# Patient Record
Sex: MALE | Race: WHITE | NOT HISPANIC OR LATINO | Employment: OTHER | URBAN - METROPOLITAN AREA
[De-identification: names, ages, dates, MRNs, and addresses within clinical notes are randomized per-mention and may not be internally consistent; named-entity substitution may affect disease eponyms.]

---

## 2017-01-12 ENCOUNTER — CLINICAL SUPPORT (OUTPATIENT)
Dept: UROLOGY | Facility: CLINIC | Age: 72
End: 2017-01-12
Payer: MEDICARE

## 2017-01-12 DIAGNOSIS — E29.1 MALE HYPOGONADISM: Primary | ICD-10-CM

## 2017-01-12 PROCEDURE — 96372 THER/PROPH/DIAG INJ SC/IM: CPT | Mod: PBBFAC,PO

## 2017-01-12 RX ADMIN — TESTOSTERONE CYPIONATE 200 MG: 200 INJECTION, SOLUTION INTRAMUSCULAR at 10:01

## 2017-01-26 ENCOUNTER — CLINICAL SUPPORT (OUTPATIENT)
Dept: UROLOGY | Facility: CLINIC | Age: 72
End: 2017-01-26
Payer: MEDICARE

## 2017-01-26 DIAGNOSIS — R79.89 LOW SERUM TESTOSTERONE LEVEL: Primary | ICD-10-CM

## 2017-01-26 PROCEDURE — 96372 THER/PROPH/DIAG INJ SC/IM: CPT | Mod: PBBFAC,PO

## 2017-01-26 RX ADMIN — TESTOSTERONE CYPIONATE 200 MG: 200 INJECTION, SOLUTION INTRAMUSCULAR at 10:01

## 2017-02-07 ENCOUNTER — CLINICAL SUPPORT (OUTPATIENT)
Dept: UROLOGY | Facility: CLINIC | Age: 72
End: 2017-02-07
Payer: MEDICARE

## 2017-02-07 DIAGNOSIS — E29.1 MALE HYPOGONADISM: Primary | ICD-10-CM

## 2017-02-07 PROCEDURE — 96372 THER/PROPH/DIAG INJ SC/IM: CPT | Mod: PBBFAC,PO

## 2017-02-07 RX ORDER — TESTOSTERONE CYPIONATE 200 MG/ML
200 INJECTION, SOLUTION INTRAMUSCULAR
Status: DISCONTINUED | OUTPATIENT
Start: 2017-02-07 | End: 2017-03-20

## 2017-02-07 RX ADMIN — TESTOSTERONE CYPIONATE 200 MG: 200 INJECTION, SOLUTION INTRAMUSCULAR at 10:02

## 2017-02-21 ENCOUNTER — CLINICAL SUPPORT (OUTPATIENT)
Dept: UROLOGY | Facility: CLINIC | Age: 72
End: 2017-02-21
Payer: MEDICARE

## 2017-02-21 DIAGNOSIS — E29.1 MALE HYPOGONADISM: Primary | ICD-10-CM

## 2017-02-21 PROCEDURE — 96372 THER/PROPH/DIAG INJ SC/IM: CPT | Mod: PBBFAC,PO

## 2017-02-21 RX ADMIN — TESTOSTERONE CYPIONATE 200 MG: 200 INJECTION, SOLUTION INTRAMUSCULAR at 10:02

## 2017-03-07 ENCOUNTER — CLINICAL SUPPORT (OUTPATIENT)
Dept: UROLOGY | Facility: CLINIC | Age: 72
End: 2017-03-07
Payer: MEDICARE

## 2017-03-07 DIAGNOSIS — E29.1 MALE HYPOGONADISM: Primary | ICD-10-CM

## 2017-03-07 PROCEDURE — 96372 THER/PROPH/DIAG INJ SC/IM: CPT | Mod: PBBFAC,PO

## 2017-03-07 RX ADMIN — TESTOSTERONE CYPIONATE 200 MG: 200 INJECTION, SOLUTION INTRAMUSCULAR at 09:03

## 2017-03-10 ENCOUNTER — LAB VISIT (OUTPATIENT)
Dept: LAB | Facility: HOSPITAL | Age: 72
End: 2017-03-10
Attending: UROLOGY
Payer: MEDICARE

## 2017-03-10 DIAGNOSIS — E29.1 MALE HYPOGONADISM: ICD-10-CM

## 2017-03-10 LAB
ERYTHROCYTE [DISTWIDTH] IN BLOOD BY AUTOMATED COUNT: 13.4 %
HCT VFR BLD AUTO: 51.8 %
HGB BLD-MCNC: 18 G/DL
MCH RBC QN AUTO: 31.6 PG
MCHC RBC AUTO-ENTMCNC: 34.7 %
MCV RBC AUTO: 91 FL
PLATELET # BLD AUTO: 193 K/UL
PMV BLD AUTO: 10.5 FL
RBC # BLD AUTO: 5.7 M/UL
TESTOST SERPL-MCNC: >1500 NG/DL
WBC # BLD AUTO: 7.96 K/UL

## 2017-03-10 PROCEDURE — 85027 COMPLETE CBC AUTOMATED: CPT

## 2017-03-10 PROCEDURE — 84403 ASSAY OF TOTAL TESTOSTERONE: CPT

## 2017-03-10 PROCEDURE — 36415 COLL VENOUS BLD VENIPUNCTURE: CPT | Mod: PO

## 2017-03-20 ENCOUNTER — OFFICE VISIT (OUTPATIENT)
Dept: UROLOGY | Facility: CLINIC | Age: 72
End: 2017-03-20
Payer: MEDICARE

## 2017-03-20 VITALS
DIASTOLIC BLOOD PRESSURE: 82 MMHG | BODY MASS INDEX: 26.21 KG/M2 | WEIGHT: 167 LBS | HEIGHT: 67 IN | HEART RATE: 95 BPM | SYSTOLIC BLOOD PRESSURE: 138 MMHG

## 2017-03-20 DIAGNOSIS — R79.89 LOW TESTOSTERONE LEVEL IN MALE: Primary | ICD-10-CM

## 2017-03-20 DIAGNOSIS — R31.29 HEMATURIA, MICROSCOPIC: ICD-10-CM

## 2017-03-20 DIAGNOSIS — N13.8 BPH (BENIGN PROSTATIC HYPERTROPHY) WITH URINARY OBSTRUCTION: ICD-10-CM

## 2017-03-20 DIAGNOSIS — N40.1 BPH (BENIGN PROSTATIC HYPERTROPHY) WITH URINARY OBSTRUCTION: ICD-10-CM

## 2017-03-20 LAB
BILIRUB SERPL-MCNC: ABNORMAL MG/DL
BLOOD URINE, POC: ABNORMAL
COLOR, POC UA: YELLOW
GLUCOSE UR QL STRIP: ABNORMAL
KETONES UR QL STRIP: ABNORMAL
LEUKOCYTE ESTERASE URINE, POC: ABNORMAL
NITRITE, POC UA: ABNORMAL
PH, POC UA: 5
PROTEIN, POC: ABNORMAL
SPECIFIC GRAVITY, POC UA: 1
UROBILINOGEN, POC UA: ABNORMAL

## 2017-03-20 PROCEDURE — 99999 PR PBB SHADOW E&M-EST. PATIENT-LVL III: CPT | Mod: PBBFAC,,, | Performed by: UROLOGY

## 2017-03-20 PROCEDURE — 99213 OFFICE O/P EST LOW 20 MIN: CPT | Mod: PBBFAC,PO | Performed by: UROLOGY

## 2017-03-20 PROCEDURE — 99214 OFFICE O/P EST MOD 30 MIN: CPT | Mod: S$PBB,,, | Performed by: UROLOGY

## 2017-03-20 PROCEDURE — 96372 THER/PROPH/DIAG INJ SC/IM: CPT | Mod: PBBFAC,PO

## 2017-03-20 PROCEDURE — 81002 URINALYSIS NONAUTO W/O SCOPE: CPT | Mod: PBBFAC,PO | Performed by: UROLOGY

## 2017-03-20 RX ORDER — TESTOSTERONE CYPIONATE 200 MG/ML
150 INJECTION, SOLUTION INTRAMUSCULAR
Status: COMPLETED | OUTPATIENT
Start: 2017-03-20 | End: 2017-05-30

## 2017-03-20 RX ORDER — TAMSULOSIN HYDROCHLORIDE 0.4 MG/1
0.4 CAPSULE ORAL DAILY
Qty: 30 CAPSULE | Refills: 11 | Status: SHIPPED | OUTPATIENT
Start: 2017-03-20 | End: 2018-03-21

## 2017-03-20 RX ADMIN — TESTOSTERONE CYPIONATE 150 MG: 200 INJECTION, SOLUTION INTRAMUSCULAR at 02:03

## 2017-03-20 NOTE — MR AVS SNAPSHOT
"    [facility]  [address] [facility] [address]  [place] [address]  Phone: [phone]                  Mohit Schneider   3/20/2017 1:00 PM   Office Visit    Description:  Male : 1945   Provider:  BIBI Fonseca MD   Department:  [facility]           Reason for Visit     Follow-up                To Do List           Goals (5 Years of Data)     None      [facility]     [facility] Care [facility] -  Assistance  Registered nurses in the [facility] provide clinical advisement, health education, appointment booking, and other advisory services.  Call for this free service at [phone].             Medications           Message regarding Medications     Verify the changes and/or additions to your medication regime listed below are the same as discussed with your clinician today.  If any of these changes or additions are incorrect, please notify your healthcare provider.             Verify that the below list of medications is an accurate representation of the medications you are currently taking.  If none reported, the list may be blank. If incorrect, please contact your healthcare provider. Carry this list with you in case of emergency.           Current Medications     ADVAIR DISKUS 250-50 mcg/dose diskus inhaler USE 1 PUFF PO BID UTD    fexofenadine (ALLEGRA) 180 MG tablet Take 100 mg by mouth once daily.    hydrochlorothiazide (HYDRODIURIL) 25 MG tablet TK 1 T PO QD    irbesartan (AVAPRO) 150 MG tablet TK 1 T PO QD           Clinical Reference Information           Your Vitals Were     BP Pulse Height Weight BMI    138/82 (BP Location: Right arm) 95 5' 7" (1.702 m) 75.7 kg (166 lb 15.6 oz) 26.15 kg/m2      Blood Pressure          Most Recent Value    BP  138/82      Allergies as of 3/20/2017     Penicillins      Immunizations Administered on Date of Encounter - 3/20/2017     None      Language Assistance Services     ATTENTION: Language assistance services are " available, free of charge. Please call [phone].      ATENCIÓN: Si habla janny, tiene a johnson disposición servicios gratuitos de asistencia lingüística. Llame al [phone].     CHÚ Ý: N?u b?n nói Ti?ng Vi?t, có các d?ch v? h? tr? ngôn ng? mi?n phí dành cho b?n. G?i s? [phone].         [facility] complies with applicable Federal civil rights laws and does not discriminate on the basis of race, color, national origin, age, disability, or sex.

## 2017-03-20 NOTE — PROGRESS NOTES
Subjective:       Patient ID: Mohit Schneider is a 71 y.o. male.    Chief Complaint: Follow-up    HPI     71 year old with low testosterone. He has done well on IM testosterone replacement. He is here for follow up and lab check.   He is doing well.  He has increased energy level. His peak testosterone is >1500. H&H are normal.  Last PSA is 1.6.   He is scheduled for another PSA with Dr. Arias next month.  He now says the his urinary symptoms are becoming more bothersome.  Weak flow and urinary frequency.  He denies hematuria and dysuria.  Has microhematuria which he says has been a chronic finding.  He had previous cystoscopy years ago.  Last few UA had trace blood only.   Urine dipstick shows positive for 2+blood.  Micro exam: 3-5 RBC's per HPF.    Review of Systems   Constitutional: Negative for fever.   Genitourinary: Negative for dysuria and hematuria.       Objective:      Physical Exam   Constitutional: He is oriented to person, place, and time. He appears well-developed and well-nourished.   HENT:   Head: Normocephalic and atraumatic.   Eyes: Conjunctivae are normal.   Cardiovascular: Normal rate.    Pulmonary/Chest: Effort normal.   Genitourinary: Rectal exam shows no mass and anal tone normal. Prostate is enlarged (30g s/s/a). Prostate is not tender.   Musculoskeletal: Normal range of motion.   Neurological: He is alert and oriented to person, place, and time.   Skin: Skin is warm and dry. No rash noted.   Psychiatric: He has a normal mood and affect.   Vitals reviewed.      Assessment:       1. Low testosterone level in male    2. BPH (benign prostatic hypertrophy) with urinary obstruction    3. Hematuria, microscopic        Plan:       Low testosterone level in male  -     POCT URINE DIPSTICK WITHOUT MICROSCOPE  -     testosterone cypionate injection 150 mg; Inject 0.75 mLs (150 mg total) into the muscle every 14 (fourteen) days.  -     Testosterone; Future; Expected date: 9/20/17  -     Hematocrit;  Future; Expected date: 9/20/17  -     Hemoglobin; Future; Expected date: 9/20/17    BPH (benign prostatic hypertrophy) with urinary obstruction  -     tamsulosin (FLOMAX) 0.4 mg Cp24; Take 1 capsule (0.4 mg total) by mouth once daily.  Dispense: 30 capsule; Refill: 11    Hematuria, microscopic        Decrease dose to 150 mg every 2 weeks.  Trial Flomax.  RTC 6 months with repeat labs.  If hematuria persist, will recommend repeat cystoscopy.

## 2017-04-04 ENCOUNTER — CLINICAL SUPPORT (OUTPATIENT)
Dept: UROLOGY | Facility: CLINIC | Age: 72
End: 2017-04-04
Payer: MEDICARE

## 2017-04-04 DIAGNOSIS — E29.1 MALE HYPOGONADISM: Primary | ICD-10-CM

## 2017-04-04 PROCEDURE — 96372 THER/PROPH/DIAG INJ SC/IM: CPT | Mod: PBBFAC,PO

## 2017-04-04 RX ADMIN — TESTOSTERONE CYPIONATE 150 MG: 200 INJECTION, SOLUTION INTRAMUSCULAR at 10:04

## 2017-04-18 ENCOUNTER — CLINICAL SUPPORT (OUTPATIENT)
Dept: UROLOGY | Facility: CLINIC | Age: 72
End: 2017-04-18
Payer: MEDICARE

## 2017-04-18 DIAGNOSIS — E29.1 HYPOGONADISM MALE: Primary | ICD-10-CM

## 2017-04-18 PROCEDURE — 96372 THER/PROPH/DIAG INJ SC/IM: CPT | Mod: PBBFAC,PO

## 2017-04-18 RX ADMIN — TESTOSTERONE CYPIONATE 150 MG: 200 INJECTION, SOLUTION INTRAMUSCULAR at 09:04

## 2017-05-02 ENCOUNTER — CLINICAL SUPPORT (OUTPATIENT)
Dept: UROLOGY | Facility: CLINIC | Age: 72
End: 2017-05-02
Payer: MEDICARE

## 2017-05-02 DIAGNOSIS — E29.1 MALE HYPOGONADISM: Primary | ICD-10-CM

## 2017-05-02 PROCEDURE — 96372 THER/PROPH/DIAG INJ SC/IM: CPT | Mod: PBBFAC,PO

## 2017-05-02 RX ADMIN — TESTOSTERONE CYPIONATE 150 MG: 200 INJECTION, SOLUTION INTRAMUSCULAR at 09:05

## 2017-05-16 ENCOUNTER — CLINICAL SUPPORT (OUTPATIENT)
Dept: UROLOGY | Facility: CLINIC | Age: 72
End: 2017-05-16
Payer: MEDICARE

## 2017-05-16 DIAGNOSIS — R79.89 LOW SERUM TESTOSTERONE LEVEL: Primary | ICD-10-CM

## 2017-05-16 PROCEDURE — 96372 THER/PROPH/DIAG INJ SC/IM: CPT | Mod: PBBFAC,PO

## 2017-05-16 RX ADMIN — TESTOSTERONE CYPIONATE 150 MG: 200 INJECTION, SOLUTION INTRAMUSCULAR at 09:05

## 2017-05-30 ENCOUNTER — CLINICAL SUPPORT (OUTPATIENT)
Dept: UROLOGY | Facility: CLINIC | Age: 72
End: 2017-05-30
Payer: MEDICARE

## 2017-05-30 DIAGNOSIS — E29.1 MALE HYPOGONADISM: Primary | ICD-10-CM

## 2017-05-30 PROCEDURE — 96372 THER/PROPH/DIAG INJ SC/IM: CPT | Mod: PBBFAC,PO

## 2017-05-30 RX ADMIN — TESTOSTERONE CYPIONATE 150 MG: 200 INJECTION, SOLUTION INTRAMUSCULAR at 10:05

## 2017-05-30 NOTE — PROGRESS NOTES
Depotestosterone 150mg given deep IM to Left Ventrogluteal per written order from Dr Fonseca; 2 pt identifiers used; allergies assessed prior to medication administration; pt tolerated injection well.

## 2017-06-13 ENCOUNTER — CLINICAL SUPPORT (OUTPATIENT)
Dept: UROLOGY | Facility: CLINIC | Age: 72
End: 2017-06-13
Payer: MEDICARE

## 2017-06-13 DIAGNOSIS — E29.1 MALE HYPOGONADISM: Primary | ICD-10-CM

## 2017-06-13 PROCEDURE — 96372 THER/PROPH/DIAG INJ SC/IM: CPT | Mod: PBBFAC,PO

## 2017-06-13 RX ORDER — TESTOSTERONE CYPIONATE 200 MG/ML
150 INJECTION, SOLUTION INTRAMUSCULAR
Status: COMPLETED | OUTPATIENT
Start: 2017-06-13 | End: 2017-08-24

## 2017-06-13 RX ADMIN — TESTOSTERONE CYPIONATE 150 MG: 200 INJECTION, SOLUTION INTRAMUSCULAR at 10:06

## 2017-06-27 ENCOUNTER — CLINICAL SUPPORT (OUTPATIENT)
Dept: UROLOGY | Facility: CLINIC | Age: 72
End: 2017-06-27
Payer: MEDICARE

## 2017-06-27 DIAGNOSIS — E29.1 HYPOGONADISM MALE: Primary | ICD-10-CM

## 2017-06-27 PROCEDURE — 96372 THER/PROPH/DIAG INJ SC/IM: CPT | Mod: PBBFAC,PO

## 2017-06-27 RX ADMIN — TESTOSTERONE CYPIONATE 150 MG: 200 INJECTION, SOLUTION INTRAMUSCULAR at 10:06

## 2017-07-11 ENCOUNTER — CLINICAL SUPPORT (OUTPATIENT)
Dept: UROLOGY | Facility: CLINIC | Age: 72
End: 2017-07-11
Payer: MEDICARE

## 2017-07-11 DIAGNOSIS — R79.89 LOW SERUM TESTOSTERONE LEVEL: Primary | ICD-10-CM

## 2017-07-11 PROCEDURE — 96372 THER/PROPH/DIAG INJ SC/IM: CPT | Mod: PBBFAC,PO

## 2017-07-11 RX ADMIN — TESTOSTERONE CYPIONATE 150 MG: 200 INJECTION, SOLUTION INTRAMUSCULAR at 10:07

## 2017-07-25 ENCOUNTER — CLINICAL SUPPORT (OUTPATIENT)
Dept: UROLOGY | Facility: CLINIC | Age: 72
End: 2017-07-25
Payer: MEDICARE

## 2017-07-25 DIAGNOSIS — E29.1 MALE HYPOGONADISM: Primary | ICD-10-CM

## 2017-07-25 PROCEDURE — 96372 THER/PROPH/DIAG INJ SC/IM: CPT | Mod: PBBFAC,PO

## 2017-07-25 RX ADMIN — TESTOSTERONE CYPIONATE 150 MG: 200 INJECTION, SOLUTION INTRAMUSCULAR at 09:07

## 2017-08-08 ENCOUNTER — CLINICAL SUPPORT (OUTPATIENT)
Dept: UROLOGY | Facility: CLINIC | Age: 72
End: 2017-08-08
Payer: MEDICARE

## 2017-08-08 DIAGNOSIS — E29.1 MALE HYPOGONADISM: Primary | ICD-10-CM

## 2017-08-08 PROCEDURE — 99211 OFF/OP EST MAY X REQ PHY/QHP: CPT | Mod: PBBFAC,PO,25

## 2017-08-08 PROCEDURE — 96372 THER/PROPH/DIAG INJ SC/IM: CPT | Mod: PBBFAC,PO

## 2017-08-08 PROCEDURE — 99999 PR PBB SHADOW E&M-EST. PATIENT-LVL I: CPT | Mod: PBBFAC,,,

## 2017-08-08 RX ADMIN — TESTOSTERONE CYPIONATE 150 MG: 200 INJECTION, SOLUTION INTRAMUSCULAR at 10:08

## 2017-08-08 NOTE — PROGRESS NOTES
Pt received 150 mg testosterone injection IM in RUQ of buttocks. Pt tolerated procedure well.  Pt was instructed to wait 15 min for observation for adverse reaction after the injection. Pt refused to stay and verbalized understanding of risk.

## 2017-08-24 ENCOUNTER — CLINICAL SUPPORT (OUTPATIENT)
Dept: UROLOGY | Facility: CLINIC | Age: 72
End: 2017-08-24
Payer: MEDICARE

## 2017-08-24 VITALS
DIASTOLIC BLOOD PRESSURE: 80 MMHG | HEART RATE: 76 BPM | SYSTOLIC BLOOD PRESSURE: 132 MMHG | WEIGHT: 166 LBS | HEIGHT: 67 IN | BODY MASS INDEX: 26.06 KG/M2

## 2017-08-24 DIAGNOSIS — E29.1 MALE HYPOGONADISM: Primary | ICD-10-CM

## 2017-08-24 PROCEDURE — 99999 PR PBB SHADOW E&M-EST. PATIENT-LVL III: CPT | Mod: PBBFAC,,,

## 2017-08-24 PROCEDURE — 96372 THER/PROPH/DIAG INJ SC/IM: CPT | Mod: PBBFAC,PO

## 2017-08-24 PROCEDURE — 99213 OFFICE O/P EST LOW 20 MIN: CPT | Mod: PBBFAC,PO,25

## 2017-08-24 RX ADMIN — TESTOSTERONE CYPIONATE 150 MG: 200 INJECTION, SOLUTION INTRAMUSCULAR at 09:08

## 2017-08-24 NOTE — PROGRESS NOTES
Pt received 150 mg testosterone injection IM in LUQ of buttocks. Pt tolerated procedure well. Pt refused to wait for observation after the injection.

## 2017-09-05 ENCOUNTER — CLINICAL SUPPORT (OUTPATIENT)
Dept: UROLOGY | Facility: CLINIC | Age: 72
End: 2017-09-05
Payer: MEDICARE

## 2017-09-05 VITALS
HEART RATE: 69 BPM | BODY MASS INDEX: 26.06 KG/M2 | WEIGHT: 166 LBS | SYSTOLIC BLOOD PRESSURE: 135 MMHG | HEIGHT: 67 IN | DIASTOLIC BLOOD PRESSURE: 80 MMHG

## 2017-09-05 DIAGNOSIS — E29.1 HYPOGONADISM IN MALE: Primary | ICD-10-CM

## 2017-09-05 PROCEDURE — 99212 OFFICE O/P EST SF 10 MIN: CPT | Mod: PBBFAC,PO

## 2017-09-05 PROCEDURE — 99999 PR PBB SHADOW E&M-EST. PATIENT-LVL II: CPT | Mod: PBBFAC,,,

## 2017-09-05 PROCEDURE — 96372 THER/PROPH/DIAG INJ SC/IM: CPT | Mod: PBBFAC,PO

## 2017-09-05 RX ORDER — TESTOSTERONE CYPIONATE 200 MG/ML
150 INJECTION, SOLUTION INTRAMUSCULAR
Status: COMPLETED | OUTPATIENT
Start: 2017-09-05 | End: 2017-12-18

## 2017-09-05 RX ADMIN — TESTOSTERONE CYPIONATE 150 MG: 200 INJECTION, SOLUTION INTRAMUSCULAR at 09:09

## 2017-09-05 NOTE — PROGRESS NOTES
Pt received 150 mg testosterone injection IM in RUQ of buttocks. Pt tolerated procedure well. Pt refused to stay for observation.

## 2017-09-19 ENCOUNTER — CLINICAL SUPPORT (OUTPATIENT)
Dept: UROLOGY | Facility: CLINIC | Age: 72
End: 2017-09-19
Payer: MEDICARE

## 2017-09-19 VITALS
SYSTOLIC BLOOD PRESSURE: 133 MMHG | WEIGHT: 166 LBS | HEART RATE: 73 BPM | BODY MASS INDEX: 26.06 KG/M2 | HEIGHT: 67 IN | DIASTOLIC BLOOD PRESSURE: 80 MMHG

## 2017-09-19 DIAGNOSIS — E29.1 MALE HYPOGONADISM: Primary | ICD-10-CM

## 2017-09-19 PROCEDURE — 96372 THER/PROPH/DIAG INJ SC/IM: CPT | Mod: PBBFAC,PO

## 2017-09-19 PROCEDURE — 99999 PR PBB SHADOW E&M-EST. PATIENT-LVL III: CPT | Mod: PBBFAC,,,

## 2017-09-19 PROCEDURE — 99213 OFFICE O/P EST LOW 20 MIN: CPT | Mod: PBBFAC,PO,25

## 2017-09-19 RX ADMIN — TESTOSTERONE CYPIONATE 150 MG: 200 INJECTION, SOLUTION INTRAMUSCULAR at 10:09

## 2017-09-19 NOTE — PROGRESS NOTES
Pt received 150 mg testosterone injection IM in LUQ of buttocks. Pt tolerated procedure well. Pt refused to stay for observation.

## 2017-10-02 ENCOUNTER — CLINICAL SUPPORT (OUTPATIENT)
Dept: UROLOGY | Facility: CLINIC | Age: 72
End: 2017-10-02
Payer: MEDICARE

## 2017-10-02 DIAGNOSIS — E29.1 HYPOGONADISM MALE: Primary | ICD-10-CM

## 2017-10-02 PROCEDURE — 99999 PR PBB SHADOW E&M-EST. PATIENT-LVL I: CPT | Mod: PBBFAC,,,

## 2017-10-02 PROCEDURE — 96372 THER/PROPH/DIAG INJ SC/IM: CPT | Mod: PBBFAC,PO

## 2017-10-02 PROCEDURE — 99211 OFF/OP EST MAY X REQ PHY/QHP: CPT | Mod: PBBFAC,PO,25

## 2017-10-02 RX ADMIN — TESTOSTERONE CYPIONATE 150 MG: 200 INJECTION, SOLUTION INTRAMUSCULAR at 01:10

## 2017-10-02 NOTE — PROGRESS NOTES
Testosterone 150 ml  injection given, see MARS. Educated patient about Adverse Medication Reaction and instructed patient to wait for 15 minutes after injection.  Patient declined to wait.

## 2017-10-16 ENCOUNTER — CLINICAL SUPPORT (OUTPATIENT)
Dept: UROLOGY | Facility: CLINIC | Age: 72
End: 2017-10-16
Payer: MEDICARE

## 2017-10-16 DIAGNOSIS — E29.1 HYPOGONADISM MALE: Primary | ICD-10-CM

## 2017-10-16 PROCEDURE — 96372 THER/PROPH/DIAG INJ SC/IM: CPT | Mod: PBBFAC,PO

## 2017-10-16 RX ADMIN — TESTOSTERONE CYPIONATE 150 MG: 200 INJECTION, SOLUTION INTRAMUSCULAR at 02:10

## 2017-10-16 NOTE — PROGRESS NOTES
Testosterone 150 mg  injection given, see MARS. Educated patient about Adverse Medication Reaction and instructed patient to wait for 15 minutes after injection.  Patient had no adverse reactions.

## 2017-10-30 ENCOUNTER — CLINICAL SUPPORT (OUTPATIENT)
Dept: UROLOGY | Facility: CLINIC | Age: 72
End: 2017-10-30
Payer: MEDICARE

## 2017-10-30 DIAGNOSIS — E29.1 MALE HYPOGONADISM: Primary | ICD-10-CM

## 2017-10-30 PROCEDURE — 96372 THER/PROPH/DIAG INJ SC/IM: CPT | Mod: PBBFAC,PO

## 2017-10-30 PROCEDURE — 99212 OFFICE O/P EST SF 10 MIN: CPT | Mod: PBBFAC,PO

## 2017-10-30 PROCEDURE — 99999 PR PBB SHADOW E&M-EST. PATIENT-LVL II: CPT | Mod: PBBFAC,,,

## 2017-10-30 RX ADMIN — TESTOSTERONE CYPIONATE 150 MG: 200 INJECTION, SOLUTION INTRAMUSCULAR at 01:10

## 2017-11-20 ENCOUNTER — CLINICAL SUPPORT (OUTPATIENT)
Dept: UROLOGY | Facility: CLINIC | Age: 72
End: 2017-11-20
Payer: MEDICARE

## 2017-11-20 DIAGNOSIS — E29.1 MALE HYPOGONADISM: Primary | ICD-10-CM

## 2017-11-20 PROCEDURE — 96372 THER/PROPH/DIAG INJ SC/IM: CPT | Mod: PBBFAC,PO

## 2017-11-20 PROCEDURE — 99212 OFFICE O/P EST SF 10 MIN: CPT | Mod: PBBFAC,PO

## 2017-11-20 PROCEDURE — 99999 PR PBB SHADOW E&M-EST. PATIENT-LVL II: CPT | Mod: PBBFAC,,,

## 2017-11-20 RX ADMIN — TESTOSTERONE CYPIONATE 150 MG: 200 INJECTION, SOLUTION INTRAMUSCULAR at 01:11

## 2017-12-04 ENCOUNTER — CLINICAL SUPPORT (OUTPATIENT)
Dept: UROLOGY | Facility: CLINIC | Age: 72
End: 2017-12-04
Payer: MEDICARE

## 2017-12-04 DIAGNOSIS — E29.1 3-OXO-5 ALPHA-STEROID DELTA 4-DEHYDROGENASE DEFICIENCY: Primary | ICD-10-CM

## 2017-12-04 PROCEDURE — 99212 OFFICE O/P EST SF 10 MIN: CPT | Mod: PBBFAC,PO,25

## 2017-12-04 PROCEDURE — 96372 THER/PROPH/DIAG INJ SC/IM: CPT | Mod: PBBFAC,PO

## 2017-12-04 PROCEDURE — 99999 PR PBB SHADOW E&M-EST. PATIENT-LVL II: CPT | Mod: PBBFAC,,,

## 2017-12-04 RX ADMIN — TESTOSTERONE CYPIONATE 150 MG: 200 INJECTION, SOLUTION INTRAMUSCULAR at 02:12

## 2017-12-04 NOTE — PROGRESS NOTES
Pt received 150 mg testosterone injection IM in RUQ of buttocks. Pt tolerated procedure well. Pt refused to stay for observation. Pt verbalized understand of the risk with leaving the clinic.

## 2017-12-18 ENCOUNTER — CLINICAL SUPPORT (OUTPATIENT)
Dept: UROLOGY | Facility: CLINIC | Age: 72
End: 2017-12-18
Payer: MEDICARE

## 2017-12-18 DIAGNOSIS — E29.1 MALE HYPOGONADISM: Primary | ICD-10-CM

## 2017-12-18 PROCEDURE — 99212 OFFICE O/P EST SF 10 MIN: CPT | Mod: PBBFAC,PO

## 2017-12-18 PROCEDURE — 96372 THER/PROPH/DIAG INJ SC/IM: CPT | Mod: PBBFAC,PO

## 2017-12-18 PROCEDURE — 99999 PR PBB SHADOW E&M-EST. PATIENT-LVL II: CPT | Mod: PBBFAC,,,

## 2017-12-18 RX ORDER — TESTOSTERONE CYPIONATE 200 MG/ML
150 INJECTION, SOLUTION INTRAMUSCULAR
Status: SHIPPED | OUTPATIENT
Start: 2018-01-02 | End: 2018-03-27

## 2017-12-18 RX ADMIN — TESTOSTERONE CYPIONATE 150 MG: 200 INJECTION, SOLUTION INTRAMUSCULAR at 01:12

## 2017-12-18 NOTE — PROGRESS NOTES
Pt received 150 mg testosterone injection IM in LUQ of buttocks. Pt tolerated procedure well. Pt refused to stay for observation. Pt verbalized understand of the risk with leaving the clinic.

## 2018-01-02 ENCOUNTER — CLINICAL SUPPORT (OUTPATIENT)
Dept: UROLOGY | Facility: CLINIC | Age: 73
End: 2018-01-02
Payer: MEDICARE

## 2018-01-02 DIAGNOSIS — E29.1 HYPOGONADISM MALE: Primary | ICD-10-CM

## 2018-01-02 PROCEDURE — 99211 OFF/OP EST MAY X REQ PHY/QHP: CPT | Mod: PBBFAC,PO,25

## 2018-01-02 PROCEDURE — 99999 PR PBB SHADOW E&M-EST. PATIENT-LVL I: CPT | Mod: PBBFAC,,,

## 2018-01-02 PROCEDURE — 96372 THER/PROPH/DIAG INJ SC/IM: CPT | Mod: PBBFAC,PO

## 2018-01-02 RX ADMIN — TESTOSTERONE CYPIONATE 150 MG: 200 INJECTION, SOLUTION INTRAMUSCULAR at 02:01

## 2018-01-02 NOTE — PROGRESS NOTES
Testosterone 150 mg injection given, see MARS. Educated patient about Adverse Medication Reaction and instructed patient to wait for 15 minutes after injection.  Patient declined to wait.

## 2018-01-16 ENCOUNTER — CLINICAL SUPPORT (OUTPATIENT)
Dept: UROLOGY | Facility: CLINIC | Age: 73
End: 2018-01-16
Payer: MEDICARE

## 2018-01-16 DIAGNOSIS — E29.1 MALE HYPOGONADISM: Primary | ICD-10-CM

## 2018-01-16 PROCEDURE — 99212 OFFICE O/P EST SF 10 MIN: CPT | Mod: PBBFAC,PO,25

## 2018-01-16 PROCEDURE — 96372 THER/PROPH/DIAG INJ SC/IM: CPT | Mod: PBBFAC,PO

## 2018-01-16 PROCEDURE — 99999 PR PBB SHADOW E&M-EST. PATIENT-LVL II: CPT | Mod: PBBFAC,,,

## 2018-01-16 RX ADMIN — TESTOSTERONE CYPIONATE 150 MG: 200 INJECTION, SOLUTION INTRAMUSCULAR at 11:01

## 2018-01-30 ENCOUNTER — CLINICAL SUPPORT (OUTPATIENT)
Dept: UROLOGY | Facility: CLINIC | Age: 73
End: 2018-01-30
Payer: MEDICARE

## 2018-01-30 DIAGNOSIS — E29.1 HYPOGONADISM MALE: Primary | ICD-10-CM

## 2018-01-30 PROCEDURE — 96372 THER/PROPH/DIAG INJ SC/IM: CPT | Mod: PBBFAC,PO

## 2018-01-30 PROCEDURE — 99999 PR PBB SHADOW E&M-EST. PATIENT-LVL I: CPT | Mod: PBBFAC,,,

## 2018-01-30 PROCEDURE — 99211 OFF/OP EST MAY X REQ PHY/QHP: CPT | Mod: PBBFAC,PO

## 2018-01-30 RX ADMIN — TESTOSTERONE CYPIONATE 150 MG: 200 INJECTION, SOLUTION INTRAMUSCULAR at 11:01

## 2018-02-14 ENCOUNTER — TELEPHONE (OUTPATIENT)
Dept: UROLOGY | Facility: CLINIC | Age: 73
End: 2018-02-14

## 2018-02-14 NOTE — TELEPHONE ENCOUNTER
----- Message from Shana Dubose sent at 2/14/2018 11:01 AM CST -----  Contact: margie  Patient needs to cancel his nurse injection for today   Patient needs to reschedule [phone] (home)

## 2018-02-19 ENCOUNTER — CLINICAL SUPPORT (OUTPATIENT)
Dept: UROLOGY | Facility: CLINIC | Age: 73
End: 2018-02-19
Payer: MEDICARE

## 2018-02-19 DIAGNOSIS — E29.1 MALE HYPOGONADISM: Primary | ICD-10-CM

## 2018-02-19 PROCEDURE — 99212 OFFICE O/P EST SF 10 MIN: CPT | Mod: PBBFAC,PO,25

## 2018-02-19 PROCEDURE — 99999 PR PBB SHADOW E&M-EST. PATIENT-LVL II: CPT | Mod: PBBFAC,,,

## 2018-02-19 PROCEDURE — 96372 THER/PROPH/DIAG INJ SC/IM: CPT | Mod: PBBFAC,PO

## 2018-02-19 RX ADMIN — TESTOSTERONE CYPIONATE 150 MG: 200 INJECTION, SOLUTION INTRAMUSCULAR at 02:02

## 2018-03-06 ENCOUNTER — CLINICAL SUPPORT (OUTPATIENT)
Dept: UROLOGY | Facility: CLINIC | Age: 73
End: 2018-03-06
Payer: MEDICARE

## 2018-03-06 DIAGNOSIS — E29.1 HYPOGONADISM MALE: Primary | ICD-10-CM

## 2018-03-06 PROCEDURE — 96372 THER/PROPH/DIAG INJ SC/IM: CPT | Mod: PBBFAC,PO

## 2018-03-06 PROCEDURE — 99999 PR PBB SHADOW E&M-EST. PATIENT-LVL I: CPT | Mod: PBBFAC,,,

## 2018-03-06 PROCEDURE — 99211 OFF/OP EST MAY X REQ PHY/QHP: CPT | Mod: PBBFAC,PO,25

## 2018-03-06 RX ORDER — TESTOSTERONE CYPIONATE 200 MG/ML
150 INJECTION, SOLUTION INTRAMUSCULAR
Status: COMPLETED | OUTPATIENT
Start: 2018-03-21 | End: 2018-05-28

## 2018-03-06 RX ADMIN — TESTOSTERONE CYPIONATE 150 MG: 200 INJECTION, SOLUTION INTRAMUSCULAR at 09:03

## 2018-03-06 NOTE — PROGRESS NOTES
Testosterone 150mg injection given, see MARS. Educated patient about Adverse Medication Reaction and instructed patient to wait for 15 minutes after injection.  Patient declined to wait.

## 2018-03-09 ENCOUNTER — LAB VISIT (OUTPATIENT)
Dept: LAB | Facility: HOSPITAL | Age: 73
End: 2018-03-09
Attending: UROLOGY
Payer: MEDICARE

## 2018-03-09 DIAGNOSIS — R79.89 LOW TESTOSTERONE LEVEL IN MALE: ICD-10-CM

## 2018-03-09 LAB
HCT VFR BLD AUTO: 48.9 %
HGB BLD-MCNC: 16.3 G/DL
TESTOST SERPL-MCNC: 1447 NG/DL

## 2018-03-09 PROCEDURE — 36415 COLL VENOUS BLD VENIPUNCTURE: CPT | Mod: PO

## 2018-03-09 PROCEDURE — 85018 HEMOGLOBIN: CPT

## 2018-03-09 PROCEDURE — 85014 HEMATOCRIT: CPT

## 2018-03-09 PROCEDURE — 84403 ASSAY OF TOTAL TESTOSTERONE: CPT

## 2018-03-21 ENCOUNTER — OFFICE VISIT (OUTPATIENT)
Dept: UROLOGY | Facility: CLINIC | Age: 73
End: 2018-03-21
Payer: MEDICARE

## 2018-03-21 VITALS
SYSTOLIC BLOOD PRESSURE: 127 MMHG | BODY MASS INDEX: 26.12 KG/M2 | WEIGHT: 166.44 LBS | HEIGHT: 67 IN | DIASTOLIC BLOOD PRESSURE: 80 MMHG | HEART RATE: 79 BPM

## 2018-03-21 DIAGNOSIS — R31.29 HEMATURIA, MICROSCOPIC: ICD-10-CM

## 2018-03-21 DIAGNOSIS — N40.0 BPH WITHOUT URINARY OBSTRUCTION: ICD-10-CM

## 2018-03-21 DIAGNOSIS — R79.89 LOW SERUM TESTOSTERONE: Primary | ICD-10-CM

## 2018-03-21 LAB
BILIRUB SERPL-MCNC: ABNORMAL MG/DL
BLOOD URINE, POC: ABNORMAL
COLOR, POC UA: YELLOW
GLUCOSE UR QL STRIP: ABNORMAL
KETONES UR QL STRIP: ABNORMAL
LEUKOCYTE ESTERASE URINE, POC: ABNORMAL
NITRITE, POC UA: ABNORMAL
PH, POC UA: 5
PROTEIN, POC: ABNORMAL
SPECIFIC GRAVITY, POC UA: 1.02
UROBILINOGEN, POC UA: ABNORMAL

## 2018-03-21 PROCEDURE — 99214 OFFICE O/P EST MOD 30 MIN: CPT | Mod: S$PBB,,, | Performed by: UROLOGY

## 2018-03-21 PROCEDURE — 96372 THER/PROPH/DIAG INJ SC/IM: CPT | Mod: PBBFAC,PO

## 2018-03-21 PROCEDURE — 81002 URINALYSIS NONAUTO W/O SCOPE: CPT | Mod: PBBFAC,PO | Performed by: UROLOGY

## 2018-03-21 PROCEDURE — 99213 OFFICE O/P EST LOW 20 MIN: CPT | Mod: PBBFAC,PO,25 | Performed by: UROLOGY

## 2018-03-21 PROCEDURE — 99999 PR PBB SHADOW E&M-EST. PATIENT-LVL III: CPT | Mod: PBBFAC,,, | Performed by: UROLOGY

## 2018-03-21 RX ORDER — CARBIDOPA AND LEVODOPA 25; 100 MG/1; MG/1
1 TABLET ORAL 3 TIMES DAILY
COMMUNITY
Start: 2018-03-17

## 2018-03-21 RX ADMIN — TESTOSTERONE CYPIONATE 150 MG: 200 INJECTION, SOLUTION INTRAMUSCULAR at 09:03

## 2018-03-21 NOTE — PROGRESS NOTES
Subjective:       Patient ID: Mohit Schneider is a 72 y.o. male.    Chief Complaint: Follow-up    HPI     72 year old with low testosterone. He has done well on IM testosterone replacement. He is here for follow up and lab check.   He is doing well.  He has increased energy level. His peak testosterone is >1447. H&H are normal.  Last known PSA is 1.6.  Current dose is 150mg every 2 weeks.  He has no bothersome voiding symptoms.  He is no longer taking Flomax.  He denies gross hematuria and dysuria.  He has had  microhematuria which he says has been a chronic finding.  He had previous cystoscopy years ago.  Last few UA had trace blood only.   Urine dipstick shows positive for 2+blood.  Micro exam: 3-5 RBC's per HPF.    Review of Systems   Constitutional: Negative for fever.   Genitourinary: Negative for dysuria and hematuria.       Objective:      Physical Exam   Constitutional: He is oriented to person, place, and time. He appears well-developed and well-nourished.   HENT:   Head: Normocephalic and atraumatic.   Eyes: Conjunctivae are normal.   Cardiovascular: Normal rate.    Pulmonary/Chest: Effort normal.   Genitourinary: Rectal exam shows no mass and anal tone normal. Prostate is enlarged (40g, s/s/a). Prostate is not tender.   Musculoskeletal: Normal range of motion.   Neurological: He is alert and oriented to person, place, and time.   Skin: Skin is warm and dry. No rash noted.   Psychiatric: He has a normal mood and affect.   Vitals reviewed.      Assessment:       1. Low serum testosterone    2. BPH without urinary obstruction    3. Hematuria, microscopic        Plan:       Low serum testosterone  -     POCT URINE DIPSTICK WITHOUT MICROSCOPE  -     Prior Authorization Order  -     Hematocrit; Future; Expected date: 09/21/2018  -     Hemoglobin; Future; Expected date: 09/21/2018  -     Testosterone; Future; Expected date: 09/21/2018    BPH without urinary obstruction    Hematuria, microscopic      Continue  testosterone at current dose.  Get PSA from Dr. Arias.  RTC 6 months with repeat labs.    PSA is 1.55

## 2018-04-03 ENCOUNTER — CLINICAL SUPPORT (OUTPATIENT)
Dept: UROLOGY | Facility: CLINIC | Age: 73
End: 2018-04-03
Payer: MEDICARE

## 2018-04-03 DIAGNOSIS — E29.1 HYPOGONADISM MALE: Primary | ICD-10-CM

## 2018-04-03 PROCEDURE — 99211 OFF/OP EST MAY X REQ PHY/QHP: CPT | Mod: PBBFAC,PO

## 2018-04-03 PROCEDURE — 99999 PR PBB SHADOW E&M-EST. PATIENT-LVL I: CPT | Mod: PBBFAC,,,

## 2018-04-03 PROCEDURE — 96372 THER/PROPH/DIAG INJ SC/IM: CPT | Mod: PBBFAC,PO

## 2018-04-03 RX ADMIN — TESTOSTERONE CYPIONATE 150 MG: 200 INJECTION, SOLUTION INTRAMUSCULAR at 10:04

## 2018-04-17 ENCOUNTER — CLINICAL SUPPORT (OUTPATIENT)
Dept: UROLOGY | Facility: CLINIC | Age: 73
End: 2018-04-17
Payer: MEDICARE

## 2018-04-17 DIAGNOSIS — E29.1 MALE HYPOGONADISM: Primary | ICD-10-CM

## 2018-04-17 PROCEDURE — 96372 THER/PROPH/DIAG INJ SC/IM: CPT | Mod: PBBFAC,PO

## 2018-04-17 PROCEDURE — 99999 PR PBB SHADOW E&M-EST. PATIENT-LVL II: CPT | Mod: PBBFAC,,,

## 2018-04-17 PROCEDURE — 99212 OFFICE O/P EST SF 10 MIN: CPT | Mod: PBBFAC,PO

## 2018-04-17 RX ADMIN — TESTOSTERONE CYPIONATE 150 MG: 200 INJECTION, SOLUTION INTRAMUSCULAR at 10:04

## 2018-05-01 ENCOUNTER — CLINICAL SUPPORT (OUTPATIENT)
Dept: UROLOGY | Facility: CLINIC | Age: 73
End: 2018-05-01
Payer: MEDICARE

## 2018-05-01 DIAGNOSIS — E29.1 HYPOGONADISM MALE: Primary | ICD-10-CM

## 2018-05-01 PROCEDURE — 99211 OFF/OP EST MAY X REQ PHY/QHP: CPT | Mod: PBBFAC,PO,25

## 2018-05-01 PROCEDURE — 99999 PR PBB SHADOW E&M-EST. PATIENT-LVL I: CPT | Mod: PBBFAC,,,

## 2018-05-01 PROCEDURE — 96372 THER/PROPH/DIAG INJ SC/IM: CPT | Mod: PBBFAC,PO

## 2018-05-01 RX ADMIN — TESTOSTERONE CYPIONATE 150 MG: 200 INJECTION, SOLUTION INTRAMUSCULAR at 10:05

## 2018-05-15 ENCOUNTER — CLINICAL SUPPORT (OUTPATIENT)
Dept: UROLOGY | Facility: CLINIC | Age: 73
End: 2018-05-15
Payer: MEDICARE

## 2018-05-15 DIAGNOSIS — E29.1 MALE HYPOGONADISM: Primary | ICD-10-CM

## 2018-05-15 PROCEDURE — 99212 OFFICE O/P EST SF 10 MIN: CPT | Mod: PBBFAC,PO,25

## 2018-05-15 PROCEDURE — 99999 PR PBB SHADOW E&M-EST. PATIENT-LVL II: CPT | Mod: PBBFAC,,,

## 2018-05-15 PROCEDURE — 96372 THER/PROPH/DIAG INJ SC/IM: CPT | Mod: PBBFAC,PO

## 2018-05-15 RX ADMIN — TESTOSTERONE CYPIONATE 150 MG: 200 INJECTION, SOLUTION INTRAMUSCULAR at 10:05

## 2018-05-28 ENCOUNTER — CLINICAL SUPPORT (OUTPATIENT)
Dept: UROLOGY | Facility: CLINIC | Age: 73
End: 2018-05-28
Payer: MEDICARE

## 2018-05-28 DIAGNOSIS — E29.1 HYPOGONADISM MALE: Primary | ICD-10-CM

## 2018-05-28 PROCEDURE — 99999 PR PBB SHADOW E&M-EST. PATIENT-LVL I: CPT | Mod: PBBFAC,,,

## 2018-05-28 PROCEDURE — 96372 THER/PROPH/DIAG INJ SC/IM: CPT | Mod: PBBFAC,PO

## 2018-05-28 PROCEDURE — 99211 OFF/OP EST MAY X REQ PHY/QHP: CPT | Mod: PBBFAC,PO

## 2018-05-28 RX ORDER — TESTOSTERONE CYPIONATE 200 MG/ML
150 INJECTION, SOLUTION INTRAMUSCULAR
Status: COMPLETED | OUTPATIENT
Start: 2018-06-12 | End: 2018-08-22

## 2018-05-28 RX ADMIN — TESTOSTERONE CYPIONATE 150 MG: 200 INJECTION, SOLUTION INTRAMUSCULAR at 11:05

## 2018-06-13 ENCOUNTER — CLINICAL SUPPORT (OUTPATIENT)
Dept: UROLOGY | Facility: CLINIC | Age: 73
End: 2018-06-13
Payer: MEDICARE

## 2018-06-13 DIAGNOSIS — E29.1 MALE HYPOGONADISM: Primary | ICD-10-CM

## 2018-06-13 PROCEDURE — 99212 OFFICE O/P EST SF 10 MIN: CPT | Mod: PBBFAC,PO,25

## 2018-06-13 PROCEDURE — 99999 PR PBB SHADOW E&M-EST. PATIENT-LVL II: CPT | Mod: PBBFAC,,,

## 2018-06-13 PROCEDURE — 96372 THER/PROPH/DIAG INJ SC/IM: CPT | Mod: PBBFAC,PO

## 2018-06-13 RX ADMIN — TESTOSTERONE CYPIONATE 150 MG: 200 INJECTION, SOLUTION INTRAMUSCULAR at 09:06

## 2018-06-27 ENCOUNTER — CLINICAL SUPPORT (OUTPATIENT)
Dept: UROLOGY | Facility: CLINIC | Age: 73
End: 2018-06-27
Payer: MEDICARE

## 2018-06-27 DIAGNOSIS — E29.1 MALE HYPOGONADISM: Primary | ICD-10-CM

## 2018-06-27 PROCEDURE — 99999 PR PBB SHADOW E&M-EST. PATIENT-LVL I: CPT | Mod: PBBFAC,,,

## 2018-06-27 PROCEDURE — 99211 OFF/OP EST MAY X REQ PHY/QHP: CPT | Mod: PBBFAC,PO,25

## 2018-06-27 PROCEDURE — 96372 THER/PROPH/DIAG INJ SC/IM: CPT | Mod: PBBFAC,PO

## 2018-06-27 RX ADMIN — TESTOSTERONE CYPIONATE 150 MG: 200 INJECTION, SOLUTION INTRAMUSCULAR at 10:06

## 2018-07-11 ENCOUNTER — CLINICAL SUPPORT (OUTPATIENT)
Dept: UROLOGY | Facility: CLINIC | Age: 73
End: 2018-07-11
Payer: MEDICARE

## 2018-07-11 DIAGNOSIS — E29.1 HYPOGONADISM MALE: Primary | ICD-10-CM

## 2018-07-11 PROCEDURE — 99211 OFF/OP EST MAY X REQ PHY/QHP: CPT | Mod: PBBFAC,PO,25

## 2018-07-11 PROCEDURE — 96372 THER/PROPH/DIAG INJ SC/IM: CPT | Mod: PBBFAC,PO

## 2018-07-11 PROCEDURE — 99999 PR PBB SHADOW E&M-EST. PATIENT-LVL I: CPT | Mod: PBBFAC,,,

## 2018-07-11 RX ADMIN — TESTOSTERONE CYPIONATE 150 MG: 200 INJECTION, SOLUTION INTRAMUSCULAR at 09:07

## 2018-07-25 ENCOUNTER — CLINICAL SUPPORT (OUTPATIENT)
Dept: UROLOGY | Facility: CLINIC | Age: 73
End: 2018-07-25
Payer: MEDICARE

## 2018-07-25 DIAGNOSIS — E29.1 MALE HYPOGONADISM: Primary | ICD-10-CM

## 2018-07-25 PROCEDURE — 99999 PR PBB SHADOW E&M-EST. PATIENT-LVL I: CPT | Mod: PBBFAC,,,

## 2018-07-25 PROCEDURE — 96372 THER/PROPH/DIAG INJ SC/IM: CPT | Mod: PBBFAC,PO

## 2018-07-25 PROCEDURE — 99211 OFF/OP EST MAY X REQ PHY/QHP: CPT | Mod: PBBFAC,PO,25

## 2018-07-25 RX ADMIN — TESTOSTERONE CYPIONATE 150 MG: 200 INJECTION, SOLUTION INTRAMUSCULAR at 10:07

## 2018-08-08 ENCOUNTER — CLINICAL SUPPORT (OUTPATIENT)
Dept: UROLOGY | Facility: CLINIC | Age: 73
End: 2018-08-08
Payer: MEDICARE

## 2018-08-08 DIAGNOSIS — E29.1 MALE HYPOGONADISM: Primary | ICD-10-CM

## 2018-08-08 PROCEDURE — 99999 PR PBB SHADOW E&M-EST. PATIENT-LVL II: CPT | Mod: PBBFAC,,,

## 2018-08-08 PROCEDURE — 99212 OFFICE O/P EST SF 10 MIN: CPT | Mod: PBBFAC,PO

## 2018-08-08 PROCEDURE — 96372 THER/PROPH/DIAG INJ SC/IM: CPT | Mod: PBBFAC,PO

## 2018-08-08 RX ADMIN — TESTOSTERONE CYPIONATE 150 MG: 200 INJECTION, SOLUTION INTRAMUSCULAR at 10:08

## 2018-08-22 ENCOUNTER — CLINICAL SUPPORT (OUTPATIENT)
Dept: UROLOGY | Facility: CLINIC | Age: 73
End: 2018-08-22
Payer: MEDICARE

## 2018-08-22 DIAGNOSIS — E29.1 HYPOGONADISM MALE: Primary | ICD-10-CM

## 2018-08-22 PROCEDURE — 99211 OFF/OP EST MAY X REQ PHY/QHP: CPT | Mod: PBBFAC,PO

## 2018-08-22 PROCEDURE — 96372 THER/PROPH/DIAG INJ SC/IM: CPT | Mod: PBBFAC,PO

## 2018-08-22 PROCEDURE — 99999 PR PBB SHADOW E&M-EST. PATIENT-LVL I: CPT | Mod: PBBFAC,,,

## 2018-08-22 RX ORDER — TESTOSTERONE CYPIONATE 200 MG/ML
150 INJECTION, SOLUTION INTRAMUSCULAR
Status: DISCONTINUED | OUTPATIENT
Start: 2018-09-05 | End: 2018-10-31 | Stop reason: SDUPTHER

## 2018-08-22 RX ADMIN — TESTOSTERONE CYPIONATE 150 MG: 200 INJECTION, SOLUTION INTRAMUSCULAR at 10:08

## 2018-09-05 ENCOUNTER — CLINICAL SUPPORT (OUTPATIENT)
Dept: UROLOGY | Facility: CLINIC | Age: 73
End: 2018-09-05
Payer: MEDICARE

## 2018-09-05 DIAGNOSIS — E29.1 MALE HYPOGONADISM: Primary | ICD-10-CM

## 2018-09-05 PROCEDURE — 99212 OFFICE O/P EST SF 10 MIN: CPT | Mod: PBBFAC,PO,25

## 2018-09-05 PROCEDURE — 99999 PR PBB SHADOW E&M-EST. PATIENT-LVL II: CPT | Mod: PBBFAC,,,

## 2018-09-05 PROCEDURE — 96372 THER/PROPH/DIAG INJ SC/IM: CPT | Mod: PBBFAC,PO

## 2018-09-05 RX ADMIN — TESTOSTERONE CYPIONATE 150 MG: 200 INJECTION, SOLUTION INTRAMUSCULAR at 10:09

## 2018-09-10 ENCOUNTER — LAB VISIT (OUTPATIENT)
Dept: LAB | Facility: HOSPITAL | Age: 73
End: 2018-09-10
Attending: UROLOGY
Payer: MEDICARE

## 2018-09-10 DIAGNOSIS — R79.89 LOW SERUM TESTOSTERONE: ICD-10-CM

## 2018-09-10 LAB
HCT VFR BLD AUTO: 49.2 %
HGB BLD-MCNC: 16.4 G/DL
TESTOST SERPL-MCNC: 887 NG/DL

## 2018-09-10 PROCEDURE — 84403 ASSAY OF TOTAL TESTOSTERONE: CPT

## 2018-09-10 PROCEDURE — 85018 HEMOGLOBIN: CPT

## 2018-09-10 PROCEDURE — 85014 HEMATOCRIT: CPT

## 2018-09-10 PROCEDURE — 36415 COLL VENOUS BLD VENIPUNCTURE: CPT | Mod: PO

## 2018-09-19 ENCOUNTER — OFFICE VISIT (OUTPATIENT)
Dept: UROLOGY | Facility: CLINIC | Age: 73
End: 2018-09-19
Payer: MEDICARE

## 2018-09-19 VITALS
DIASTOLIC BLOOD PRESSURE: 70 MMHG | BODY MASS INDEX: 25.96 KG/M2 | SYSTOLIC BLOOD PRESSURE: 114 MMHG | HEART RATE: 76 BPM | WEIGHT: 165.38 LBS | HEIGHT: 67 IN

## 2018-09-19 DIAGNOSIS — E29.1 MALE HYPOGONADISM: Primary | ICD-10-CM

## 2018-09-19 LAB
BILIRUB SERPL-MCNC: NORMAL MG/DL
BLOOD URINE, POC: NORMAL
COLOR, POC UA: YELLOW
GLUCOSE UR QL STRIP: NORMAL
KETONES UR QL STRIP: NORMAL
LEUKOCYTE ESTERASE URINE, POC: NORMAL
NITRITE, POC UA: NORMAL
PH, POC UA: 7
PROTEIN, POC: NORMAL
SPECIFIC GRAVITY, POC UA: 1.02
UROBILINOGEN, POC UA: NORMAL

## 2018-09-19 PROCEDURE — 99213 OFFICE O/P EST LOW 20 MIN: CPT | Mod: S$PBB,,, | Performed by: UROLOGY

## 2018-09-19 PROCEDURE — 96372 THER/PROPH/DIAG INJ SC/IM: CPT | Mod: PBBFAC,PO

## 2018-09-19 PROCEDURE — 99999 PR PBB SHADOW E&M-EST. PATIENT-LVL III: CPT | Mod: PBBFAC,,, | Performed by: UROLOGY

## 2018-09-19 PROCEDURE — 81002 URINALYSIS NONAUTO W/O SCOPE: CPT | Mod: PBBFAC,PO | Performed by: UROLOGY

## 2018-09-19 PROCEDURE — 99213 OFFICE O/P EST LOW 20 MIN: CPT | Mod: PBBFAC,PO,25 | Performed by: UROLOGY

## 2018-09-19 RX ORDER — TESTOSTERONE CYPIONATE 200 MG/ML
150 INJECTION, SOLUTION INTRAMUSCULAR
Status: COMPLETED | OUTPATIENT
Start: 2018-09-19 | End: 2019-02-19

## 2018-09-19 RX ADMIN — TESTOSTERONE CYPIONATE 150 MG: 200 INJECTION, SOLUTION INTRAMUSCULAR at 10:09

## 2018-09-19 NOTE — PROGRESS NOTES
Subjective:       Patient ID: Mohit Schneider is a 73 y.o. male.    Chief Complaint: Follow-up and Injections    HPI     73 year old with low testosterone. He has done well on IM testosterone replacement. He is here for follow up and lab check.   He is doing well.  He has increased energy level. His peak testosterone is >887. H&H are normal.   Current dose is 150mg every 2 weeks.  He has no bothersome voiding symptoms.    Urine dipstick shows positive for 1+blood.  Micro exam: 2-3 RBC's per HPF      Review of Systems    Objective:      Physical Exam   Musculoskeletal: He exhibits no edema.       Assessment:       1. Male hypogonadism        Plan:       Male hypogonadism  -     POCT URINE DIPSTICK WITHOUT MICROSCOPE  -     Prior Authorization Order  -     Testosterone; Future; Expected date: 03/22/2019  -     Hemoglobin; Future; Expected date: 03/22/2019  -     Hematocrit; Future; Expected date: 03/22/2019    Other orders  -     testosterone cypionate injection 150 mg; Inject 0.75 mLs (150 mg total) into the muscle every 14 (fourteen) days.      Continue testosterone at current dose.  Get PSA from Dr. Arias.  RTC 6 months      Addendum:  PSA 9/11/2018 1.91

## 2018-10-02 ENCOUNTER — CLINICAL SUPPORT (OUTPATIENT)
Dept: UROLOGY | Facility: CLINIC | Age: 73
End: 2018-10-02
Payer: MEDICARE

## 2018-10-02 DIAGNOSIS — E29.1 MALE HYPOGONADISM: Primary | ICD-10-CM

## 2018-10-02 PROCEDURE — 96372 THER/PROPH/DIAG INJ SC/IM: CPT | Mod: PBBFAC,PO

## 2018-10-02 PROCEDURE — 99999 PR PBB SHADOW E&M-EST. PATIENT-LVL II: CPT | Mod: PBBFAC,,,

## 2018-10-02 PROCEDURE — 99212 OFFICE O/P EST SF 10 MIN: CPT | Mod: PBBFAC,PO,25

## 2018-10-02 RX ADMIN — TESTOSTERONE CYPIONATE 150 MG: 200 INJECTION, SOLUTION INTRAMUSCULAR at 10:10

## 2018-10-02 NOTE — PROGRESS NOTES
Testosterone injection given, see MAR. Patient tolerated well, recommended to wait 20 minutes after injection to observe for adverse reaction, patient declined to wait.

## 2018-10-16 ENCOUNTER — CLINICAL SUPPORT (OUTPATIENT)
Dept: UROLOGY | Facility: CLINIC | Age: 73
End: 2018-10-16
Payer: MEDICARE

## 2018-10-16 DIAGNOSIS — E29.1 MALE HYPOGONADISM: Primary | ICD-10-CM

## 2018-10-16 PROCEDURE — 99212 OFFICE O/P EST SF 10 MIN: CPT | Mod: PBBFAC,PO

## 2018-10-16 PROCEDURE — 96372 THER/PROPH/DIAG INJ SC/IM: CPT | Mod: PBBFAC,PO

## 2018-10-16 PROCEDURE — 99999 PR PBB SHADOW E&M-EST. PATIENT-LVL II: CPT | Mod: PBBFAC,,,

## 2018-10-16 RX ADMIN — TESTOSTERONE CYPIONATE 150 MG: 200 INJECTION, SOLUTION INTRAMUSCULAR at 10:10

## 2018-10-31 ENCOUNTER — CLINICAL SUPPORT (OUTPATIENT)
Dept: UROLOGY | Facility: CLINIC | Age: 73
End: 2018-10-31
Payer: MEDICARE

## 2018-10-31 DIAGNOSIS — E29.1 MALE HYPOGONADISM: Primary | ICD-10-CM

## 2018-10-31 PROCEDURE — 99999 PR PBB SHADOW E&M-EST. PATIENT-LVL II: CPT | Mod: PBBFAC,,,

## 2018-10-31 PROCEDURE — 99212 OFFICE O/P EST SF 10 MIN: CPT | Mod: PBBFAC,PO

## 2018-10-31 PROCEDURE — 96372 THER/PROPH/DIAG INJ SC/IM: CPT | Mod: PBBFAC,PO

## 2018-10-31 RX ADMIN — TESTOSTERONE CYPIONATE 150 MG: 200 INJECTION, SOLUTION INTRAMUSCULAR at 10:10

## 2018-11-14 ENCOUNTER — CLINICAL SUPPORT (OUTPATIENT)
Dept: UROLOGY | Facility: CLINIC | Age: 73
End: 2018-11-14
Payer: MEDICARE

## 2018-11-14 DIAGNOSIS — E29.1 MALE HYPOGONADISM: Primary | ICD-10-CM

## 2018-11-14 PROCEDURE — 99999 PR PBB SHADOW E&M-EST. PATIENT-LVL II: CPT | Mod: PBBFAC,,,

## 2018-11-14 PROCEDURE — 96372 THER/PROPH/DIAG INJ SC/IM: CPT | Mod: PBBFAC,PO

## 2018-11-14 PROCEDURE — 99212 OFFICE O/P EST SF 10 MIN: CPT | Mod: PBBFAC,PO

## 2018-11-14 RX ADMIN — TESTOSTERONE CYPIONATE 150 MG: 200 INJECTION, SOLUTION INTRAMUSCULAR at 10:11

## 2018-11-28 ENCOUNTER — CLINICAL SUPPORT (OUTPATIENT)
Dept: UROLOGY | Facility: CLINIC | Age: 73
End: 2018-11-28
Payer: MEDICARE

## 2018-11-28 DIAGNOSIS — E29.1 MALE HYPOGONADISM: Primary | ICD-10-CM

## 2018-11-28 PROCEDURE — 96372 THER/PROPH/DIAG INJ SC/IM: CPT | Mod: PBBFAC,PO

## 2018-11-28 PROCEDURE — 99212 OFFICE O/P EST SF 10 MIN: CPT | Mod: PBBFAC,PO,25

## 2018-11-28 PROCEDURE — 99999 PR PBB SHADOW E&M-EST. PATIENT-LVL II: CPT | Mod: PBBFAC,,,

## 2018-11-28 RX ADMIN — TESTOSTERONE CYPIONATE 150 MG: 200 INJECTION, SOLUTION INTRAMUSCULAR at 10:11

## 2018-12-12 ENCOUNTER — CLINICAL SUPPORT (OUTPATIENT)
Dept: UROLOGY | Facility: CLINIC | Age: 73
End: 2018-12-12
Payer: MEDICARE

## 2018-12-12 DIAGNOSIS — E29.1 MALE HYPOGONADISM: Primary | ICD-10-CM

## 2018-12-12 PROCEDURE — 99212 OFFICE O/P EST SF 10 MIN: CPT | Mod: PBBFAC,PO,25

## 2018-12-12 PROCEDURE — 99999 PR PBB SHADOW E&M-EST. PATIENT-LVL II: CPT | Mod: PBBFAC,,,

## 2018-12-12 PROCEDURE — 96372 THER/PROPH/DIAG INJ SC/IM: CPT | Mod: PBBFAC,PO

## 2018-12-12 RX ADMIN — TESTOSTERONE CYPIONATE 150 MG: 200 INJECTION, SOLUTION INTRAMUSCULAR at 10:12

## 2018-12-26 ENCOUNTER — CLINICAL SUPPORT (OUTPATIENT)
Dept: UROLOGY | Facility: CLINIC | Age: 73
End: 2018-12-26
Payer: MEDICARE

## 2018-12-26 DIAGNOSIS — E29.1 MALE HYPOGONADISM: Primary | ICD-10-CM

## 2018-12-26 PROCEDURE — 99211 OFF/OP EST MAY X REQ PHY/QHP: CPT | Mod: PBBFAC,PO

## 2018-12-26 PROCEDURE — 96372 THER/PROPH/DIAG INJ SC/IM: CPT | Mod: PBBFAC,PO

## 2018-12-26 PROCEDURE — 99999 PR PBB SHADOW E&M-EST. PATIENT-LVL I: CPT | Mod: PBBFAC,,,

## 2018-12-26 RX ADMIN — TESTOSTERONE CYPIONATE 150 MG: 200 INJECTION, SOLUTION INTRAMUSCULAR at 10:12

## 2019-01-09 ENCOUNTER — CLINICAL SUPPORT (OUTPATIENT)
Dept: UROLOGY | Facility: CLINIC | Age: 74
End: 2019-01-09
Payer: MEDICARE

## 2019-01-09 DIAGNOSIS — E29.1 MALE HYPOGONADISM: Primary | ICD-10-CM

## 2019-01-09 PROCEDURE — 96372 THER/PROPH/DIAG INJ SC/IM: CPT | Mod: PBBFAC,PO

## 2019-01-09 RX ADMIN — TESTOSTERONE CYPIONATE 150 MG: 200 INJECTION, SOLUTION INTRAMUSCULAR at 11:01

## 2019-01-23 ENCOUNTER — CLINICAL SUPPORT (OUTPATIENT)
Dept: UROLOGY | Facility: CLINIC | Age: 74
End: 2019-01-23
Payer: MEDICARE

## 2019-01-23 DIAGNOSIS — E29.1 MALE HYPOGONADISM: Primary | ICD-10-CM

## 2019-01-23 PROCEDURE — 96372 THER/PROPH/DIAG INJ SC/IM: CPT | Mod: PBBFAC,PO

## 2019-01-23 PROCEDURE — 99211 OFF/OP EST MAY X REQ PHY/QHP: CPT | Mod: PBBFAC,PO,25

## 2019-01-23 PROCEDURE — 99999 PR PBB SHADOW E&M-EST. PATIENT-LVL I: ICD-10-PCS | Mod: PBBFAC,,,

## 2019-01-23 PROCEDURE — 99999 PR PBB SHADOW E&M-EST. PATIENT-LVL I: CPT | Mod: PBBFAC,,,

## 2019-01-23 RX ADMIN — TESTOSTERONE CYPIONATE 150 MG: 200 INJECTION, SOLUTION INTRAMUSCULAR at 10:01

## 2019-02-06 ENCOUNTER — CLINICAL SUPPORT (OUTPATIENT)
Dept: UROLOGY | Facility: CLINIC | Age: 74
End: 2019-02-06
Payer: MEDICARE

## 2019-02-06 DIAGNOSIS — E29.1 MALE HYPOGONADISM: Primary | ICD-10-CM

## 2019-02-06 PROCEDURE — 96372 THER/PROPH/DIAG INJ SC/IM: CPT | Mod: PBBFAC,PO

## 2019-02-06 PROCEDURE — 99999 PR PBB SHADOW E&M-EST. PATIENT-LVL II: ICD-10-PCS | Mod: PBBFAC,,,

## 2019-02-06 PROCEDURE — 99999 PR PBB SHADOW E&M-EST. PATIENT-LVL II: CPT | Mod: PBBFAC,,,

## 2019-02-06 PROCEDURE — 99212 OFFICE O/P EST SF 10 MIN: CPT | Mod: PBBFAC,PO,25

## 2019-02-06 RX ADMIN — TESTOSTERONE CYPIONATE 150 MG: 200 INJECTION, SOLUTION INTRAMUSCULAR at 10:02

## 2019-02-19 ENCOUNTER — CLINICAL SUPPORT (OUTPATIENT)
Dept: UROLOGY | Facility: CLINIC | Age: 74
End: 2019-02-19
Payer: MEDICARE

## 2019-02-19 DIAGNOSIS — E29.1 MALE HYPOGONADISM: Primary | ICD-10-CM

## 2019-02-19 PROCEDURE — 99999 PR PBB SHADOW E&M-EST. PATIENT-LVL II: ICD-10-PCS | Mod: PBBFAC,,,

## 2019-02-19 PROCEDURE — 99999 PR PBB SHADOW E&M-EST. PATIENT-LVL II: CPT | Mod: PBBFAC,,,

## 2019-02-19 PROCEDURE — 99212 OFFICE O/P EST SF 10 MIN: CPT | Mod: PBBFAC,PO

## 2019-02-19 PROCEDURE — 96372 THER/PROPH/DIAG INJ SC/IM: CPT | Mod: PBBFAC,PO

## 2019-02-19 RX ORDER — TESTOSTERONE CYPIONATE 200 MG/ML
150 INJECTION, SOLUTION INTRAMUSCULAR
Status: COMPLETED | OUTPATIENT
Start: 2019-03-06 | End: 2019-05-15

## 2019-02-19 RX ADMIN — TESTOSTERONE CYPIONATE 150 MG: 200 INJECTION, SOLUTION INTRAMUSCULAR at 10:02

## 2019-03-06 ENCOUNTER — CLINICAL SUPPORT (OUTPATIENT)
Dept: UROLOGY | Facility: CLINIC | Age: 74
End: 2019-03-06
Payer: MEDICARE

## 2019-03-06 DIAGNOSIS — E29.1 MALE HYPOGONADISM: Primary | ICD-10-CM

## 2019-03-06 PROCEDURE — 99999 PR PBB SHADOW E&M-EST. PATIENT-LVL I: CPT | Mod: PBBFAC,,,

## 2019-03-06 PROCEDURE — 99999 PR PBB SHADOW E&M-EST. PATIENT-LVL I: ICD-10-PCS | Mod: PBBFAC,,,

## 2019-03-06 PROCEDURE — 96372 THER/PROPH/DIAG INJ SC/IM: CPT | Mod: PBBFAC,PO

## 2019-03-06 PROCEDURE — 99211 OFF/OP EST MAY X REQ PHY/QHP: CPT | Mod: PBBFAC,PO,25

## 2019-03-06 RX ADMIN — TESTOSTERONE CYPIONATE 150 MG: 200 INJECTION, SOLUTION INTRAMUSCULAR at 10:03

## 2019-03-11 ENCOUNTER — LAB VISIT (OUTPATIENT)
Dept: LAB | Facility: HOSPITAL | Age: 74
End: 2019-03-11
Attending: UROLOGY
Payer: MEDICARE

## 2019-03-11 DIAGNOSIS — E29.1 MALE HYPOGONADISM: ICD-10-CM

## 2019-03-11 LAB
HCT VFR BLD AUTO: 47.7 %
HGB BLD-MCNC: 16.2 G/DL
TESTOST SERPL-MCNC: 1152 NG/DL

## 2019-03-11 PROCEDURE — 84403 ASSAY OF TOTAL TESTOSTERONE: CPT

## 2019-03-11 PROCEDURE — 85014 HEMATOCRIT: CPT

## 2019-03-11 PROCEDURE — 36415 COLL VENOUS BLD VENIPUNCTURE: CPT | Mod: PO

## 2019-03-11 PROCEDURE — 85018 HEMOGLOBIN: CPT

## 2019-03-20 ENCOUNTER — CLINICAL SUPPORT (OUTPATIENT)
Dept: UROLOGY | Facility: CLINIC | Age: 74
End: 2019-03-20
Payer: MEDICARE

## 2019-03-20 DIAGNOSIS — E29.1 MALE HYPOGONADISM: Primary | ICD-10-CM

## 2019-03-20 PROCEDURE — 99999 PR PBB SHADOW E&M-EST. PATIENT-LVL II: CPT | Mod: PBBFAC,,,

## 2019-03-20 PROCEDURE — 99999 PR PBB SHADOW E&M-EST. PATIENT-LVL II: ICD-10-PCS | Mod: PBBFAC,,,

## 2019-03-20 PROCEDURE — 99212 OFFICE O/P EST SF 10 MIN: CPT | Mod: PBBFAC,PO

## 2019-03-20 PROCEDURE — 96372 THER/PROPH/DIAG INJ SC/IM: CPT | Mod: PBBFAC,PO

## 2019-03-20 RX ADMIN — TESTOSTERONE CYPIONATE 150 MG: 200 INJECTION, SOLUTION INTRAMUSCULAR at 10:03

## 2019-03-27 ENCOUNTER — OFFICE VISIT (OUTPATIENT)
Dept: UROLOGY | Facility: CLINIC | Age: 74
End: 2019-03-27
Payer: MEDICARE

## 2019-03-27 VITALS
SYSTOLIC BLOOD PRESSURE: 127 MMHG | HEIGHT: 67 IN | BODY MASS INDEX: 26.21 KG/M2 | WEIGHT: 167 LBS | HEART RATE: 70 BPM | DIASTOLIC BLOOD PRESSURE: 75 MMHG

## 2019-03-27 DIAGNOSIS — N40.0 BPH WITHOUT URINARY OBSTRUCTION: ICD-10-CM

## 2019-03-27 DIAGNOSIS — E29.1 MALE HYPOGONADISM: Primary | ICD-10-CM

## 2019-03-27 DIAGNOSIS — Z12.5 SCREENING FOR PROSTATE CANCER: ICD-10-CM

## 2019-03-27 LAB
BILIRUB SERPL-MCNC: ABNORMAL MG/DL
BLOOD URINE, POC: ABNORMAL
COLOR, POC UA: ABNORMAL
GLUCOSE UR QL STRIP: ABNORMAL
KETONES UR QL STRIP: ABNORMAL
LEUKOCYTE ESTERASE URINE, POC: ABNORMAL
NITRITE, POC UA: ABNORMAL
PH, POC UA: 6.5
PROTEIN, POC: ABNORMAL
SPECIFIC GRAVITY, POC UA: 1020
UROBILINOGEN, POC UA: ABNORMAL

## 2019-03-27 PROCEDURE — 81002 URINALYSIS NONAUTO W/O SCOPE: CPT | Mod: PBBFAC,PO | Performed by: UROLOGY

## 2019-03-27 PROCEDURE — 99214 PR OFFICE/OUTPT VISIT, EST, LEVL IV, 30-39 MIN: ICD-10-PCS | Mod: S$PBB,,, | Performed by: UROLOGY

## 2019-03-27 PROCEDURE — 99214 OFFICE O/P EST MOD 30 MIN: CPT | Mod: S$PBB,,, | Performed by: UROLOGY

## 2019-03-27 PROCEDURE — 99213 OFFICE O/P EST LOW 20 MIN: CPT | Mod: PBBFAC,PO | Performed by: UROLOGY

## 2019-03-27 PROCEDURE — 99999 PR PBB SHADOW E&M-EST. PATIENT-LVL III: ICD-10-PCS | Mod: PBBFAC,,, | Performed by: UROLOGY

## 2019-03-27 PROCEDURE — 99999 PR PBB SHADOW E&M-EST. PATIENT-LVL III: CPT | Mod: PBBFAC,,, | Performed by: UROLOGY

## 2019-03-27 NOTE — PROGRESS NOTES
Subjective:       Patient ID: Mohit Schneider is a 73 y.o. male.    Chief Complaint: Follow-up (6 month follow up)    HPI     73 year old with low testosterone.  He has done well on IM testosterone replacement.  He is here for follow up and lab check.   He is doing well.  He has increased energy level. His peak testosterone is 1152. H&H are normal.   Current dose is 150mg every 2 weeks.  He has no bothersome voiding symptoms.    Urine dipstick shows negative for nitrites, leukocytes, glucose, protein, positive for trace blood       Review of Systems   Constitutional: Negative for fever.   Genitourinary: Negative for dysuria and hematuria.       Objective:      Physical Exam   Constitutional: He is oriented to person, place, and time. He appears well-developed and well-nourished.   HENT:   Head: Normocephalic and atraumatic.   Eyes: Conjunctivae are normal.   Cardiovascular: Normal rate.   Pulmonary/Chest: Effort normal.   Abdominal: Soft.   Genitourinary: Rectal exam shows no mass and anal tone normal. Prostate is enlarged (40g, s/s/a). Prostate is not tender.   Musculoskeletal: Normal range of motion.   Neurological: He is alert and oriented to person, place, and time.   Skin: Skin is warm and dry. No rash noted.   Psychiatric: He has a normal mood and affect.   Vitals reviewed.      Assessment:       1. Male hypogonadism    2. BPH without urinary obstruction    3. Screening for prostate cancer        Plan:       Male hypogonadism  -     POCT URINE DIPSTICK WITHOUT MICROSCOPE  -     Testosterone; Future; Expected date: 09/27/2019  -     Hemoglobin; Future; Expected date: 09/27/2019  -     Hematocrit; Future; Expected date: 09/27/2019    BPH without urinary obstruction    Screening for prostate cancer  -     PSA, Screening; Future; Expected date: 09/27/2019

## 2019-04-03 ENCOUNTER — CLINICAL SUPPORT (OUTPATIENT)
Dept: UROLOGY | Facility: CLINIC | Age: 74
End: 2019-04-03
Payer: MEDICARE

## 2019-04-03 DIAGNOSIS — E29.1 MALE HYPOGONADISM: Primary | ICD-10-CM

## 2019-04-03 PROCEDURE — 99999 PR PBB SHADOW E&M-EST. PATIENT-LVL II: CPT | Mod: PBBFAC,,,

## 2019-04-03 PROCEDURE — 99212 OFFICE O/P EST SF 10 MIN: CPT | Mod: PBBFAC,PO

## 2019-04-03 PROCEDURE — 99999 PR PBB SHADOW E&M-EST. PATIENT-LVL II: ICD-10-PCS | Mod: PBBFAC,,,

## 2019-04-03 PROCEDURE — 96372 THER/PROPH/DIAG INJ SC/IM: CPT | Mod: PBBFAC,PO

## 2019-04-03 RX ADMIN — TESTOSTERONE CYPIONATE 150 MG: 200 INJECTION, SOLUTION INTRAMUSCULAR at 10:04

## 2019-04-15 ENCOUNTER — CLINICAL SUPPORT (OUTPATIENT)
Dept: UROLOGY | Facility: CLINIC | Age: 74
End: 2019-04-15
Payer: MEDICARE

## 2019-04-15 DIAGNOSIS — E29.1 MALE HYPOGONADISM: Primary | ICD-10-CM

## 2019-04-15 PROCEDURE — 96372 THER/PROPH/DIAG INJ SC/IM: CPT | Mod: PBBFAC,PO

## 2019-04-15 PROCEDURE — 99999 PR PBB SHADOW E&M-EST. PATIENT-LVL II: ICD-10-PCS | Mod: PBBFAC,,,

## 2019-04-15 PROCEDURE — 99212 OFFICE O/P EST SF 10 MIN: CPT | Mod: PBBFAC,PO

## 2019-04-15 PROCEDURE — 99999 PR PBB SHADOW E&M-EST. PATIENT-LVL II: CPT | Mod: PBBFAC,,,

## 2019-04-15 RX ADMIN — TESTOSTERONE CYPIONATE 150 MG: 200 INJECTION, SOLUTION INTRAMUSCULAR at 10:04

## 2019-05-01 ENCOUNTER — CLINICAL SUPPORT (OUTPATIENT)
Dept: UROLOGY | Facility: CLINIC | Age: 74
End: 2019-05-01
Payer: MEDICARE

## 2019-05-01 DIAGNOSIS — E29.1 MALE HYPOGONADISM: Primary | ICD-10-CM

## 2019-05-01 PROCEDURE — 99999 PR PBB SHADOW E&M-EST. PATIENT-LVL II: ICD-10-PCS | Mod: PBBFAC,,,

## 2019-05-01 PROCEDURE — 96372 THER/PROPH/DIAG INJ SC/IM: CPT | Mod: PBBFAC,PO

## 2019-05-01 PROCEDURE — 99999 PR PBB SHADOW E&M-EST. PATIENT-LVL II: CPT | Mod: PBBFAC,,,

## 2019-05-01 PROCEDURE — 99212 OFFICE O/P EST SF 10 MIN: CPT | Mod: PBBFAC,PO,25

## 2019-05-01 RX ADMIN — TESTOSTERONE CYPIONATE 150 MG: 200 INJECTION, SOLUTION INTRAMUSCULAR at 10:05

## 2019-05-15 ENCOUNTER — CLINICAL SUPPORT (OUTPATIENT)
Dept: UROLOGY | Facility: CLINIC | Age: 74
End: 2019-05-15
Payer: MEDICARE

## 2019-05-15 DIAGNOSIS — E29.1 MALE HYPOGONADISM: Primary | ICD-10-CM

## 2019-05-15 PROCEDURE — 99999 PR PBB SHADOW E&M-EST. PATIENT-LVL I: CPT | Mod: PBBFAC,,,

## 2019-05-15 PROCEDURE — 99211 OFF/OP EST MAY X REQ PHY/QHP: CPT | Mod: PBBFAC,PO

## 2019-05-15 PROCEDURE — 99999 PR PBB SHADOW E&M-EST. PATIENT-LVL I: ICD-10-PCS | Mod: PBBFAC,,,

## 2019-05-15 PROCEDURE — 96372 THER/PROPH/DIAG INJ SC/IM: CPT | Mod: PBBFAC,PO

## 2019-05-15 RX ORDER — TESTOSTERONE CYPIONATE 200 MG/ML
150 INJECTION, SOLUTION INTRAMUSCULAR
Status: COMPLETED | OUTPATIENT
Start: 2019-05-29 | End: 2019-08-07

## 2019-05-15 RX ADMIN — TESTOSTERONE CYPIONATE 150 MG: 200 INJECTION, SOLUTION INTRAMUSCULAR at 10:05

## 2019-05-21 ENCOUNTER — TELEPHONE (OUTPATIENT)
Dept: UROLOGY | Facility: CLINIC | Age: 74
End: 2019-05-21

## 2019-05-21 NOTE — TELEPHONE ENCOUNTER
----- Message from Leslie Abdalla sent at 5/21/2019  9:18 AM CDT -----    Pt  Is calling to  Reschedule francisco   For  injection/ please call [phone]

## 2019-05-29 ENCOUNTER — CLINICAL SUPPORT (OUTPATIENT)
Dept: UROLOGY | Facility: CLINIC | Age: 74
End: 2019-05-29
Payer: MEDICARE

## 2019-05-29 DIAGNOSIS — E29.1 MALE HYPOGONADISM: Primary | ICD-10-CM

## 2019-05-29 PROCEDURE — 99211 OFF/OP EST MAY X REQ PHY/QHP: CPT | Mod: PBBFAC,PO

## 2019-05-29 PROCEDURE — 99999 PR PBB SHADOW E&M-EST. PATIENT-LVL I: CPT | Mod: PBBFAC,,,

## 2019-05-29 PROCEDURE — 96372 THER/PROPH/DIAG INJ SC/IM: CPT | Mod: PBBFAC,PO

## 2019-05-29 PROCEDURE — 99999 PR PBB SHADOW E&M-EST. PATIENT-LVL I: ICD-10-PCS | Mod: PBBFAC,,,

## 2019-05-29 RX ADMIN — TESTOSTERONE CYPIONATE 150 MG: 200 INJECTION, SOLUTION INTRAMUSCULAR at 09:05

## 2019-06-12 ENCOUNTER — CLINICAL SUPPORT (OUTPATIENT)
Dept: UROLOGY | Facility: CLINIC | Age: 74
End: 2019-06-12
Payer: MEDICARE

## 2019-06-12 DIAGNOSIS — E29.1 MALE HYPOGONADISM: Primary | ICD-10-CM

## 2019-06-12 PROCEDURE — 99212 OFFICE O/P EST SF 10 MIN: CPT | Mod: PBBFAC,PO

## 2019-06-12 PROCEDURE — 99999 PR PBB SHADOW E&M-EST. PATIENT-LVL II: ICD-10-PCS | Mod: PBBFAC,,,

## 2019-06-12 PROCEDURE — 96372 THER/PROPH/DIAG INJ SC/IM: CPT | Mod: PBBFAC,PO

## 2019-06-12 PROCEDURE — 99999 PR PBB SHADOW E&M-EST. PATIENT-LVL II: CPT | Mod: PBBFAC,,,

## 2019-06-12 RX ADMIN — TESTOSTERONE CYPIONATE 150 MG: 200 INJECTION, SOLUTION INTRAMUSCULAR at 09:06

## 2019-06-12 NOTE — PROGRESS NOTES
Testosterone injection given 150mg, see MAR. Patient tolerated well, recommended to wait 20 minutes after injection to observe for adverse reaction, patient declined to wait.

## 2019-06-26 ENCOUNTER — CLINICAL SUPPORT (OUTPATIENT)
Dept: UROLOGY | Facility: CLINIC | Age: 74
End: 2019-06-26
Payer: MEDICARE

## 2019-06-26 DIAGNOSIS — E29.1 MALE HYPOGONADISM: Primary | ICD-10-CM

## 2019-06-26 PROCEDURE — 99211 OFF/OP EST MAY X REQ PHY/QHP: CPT | Mod: PBBFAC,PO

## 2019-06-26 PROCEDURE — 99999 PR PBB SHADOW E&M-EST. PATIENT-LVL I: CPT | Mod: PBBFAC,,,

## 2019-06-26 PROCEDURE — 99999 PR PBB SHADOW E&M-EST. PATIENT-LVL I: ICD-10-PCS | Mod: PBBFAC,,,

## 2019-06-26 PROCEDURE — 96372 THER/PROPH/DIAG INJ SC/IM: CPT | Mod: PBBFAC,PO

## 2019-06-26 RX ADMIN — TESTOSTERONE CYPIONATE 150 MG: 200 INJECTION, SOLUTION INTRAMUSCULAR at 10:06

## 2019-07-10 ENCOUNTER — CLINICAL SUPPORT (OUTPATIENT)
Dept: UROLOGY | Facility: CLINIC | Age: 74
End: 2019-07-10
Payer: MEDICARE

## 2019-07-10 DIAGNOSIS — E29.1 MALE HYPOGONADISM: Primary | ICD-10-CM

## 2019-07-10 PROCEDURE — 99211 OFF/OP EST MAY X REQ PHY/QHP: CPT | Mod: PBBFAC,PO

## 2019-07-10 PROCEDURE — 99999 PR PBB SHADOW E&M-EST. PATIENT-LVL I: ICD-10-PCS | Mod: PBBFAC,,,

## 2019-07-10 PROCEDURE — 99999 PR PBB SHADOW E&M-EST. PATIENT-LVL I: CPT | Mod: PBBFAC,,,

## 2019-07-10 PROCEDURE — 96372 THER/PROPH/DIAG INJ SC/IM: CPT | Mod: PBBFAC,PO

## 2019-07-10 RX ADMIN — TESTOSTERONE CYPIONATE 150 MG: 200 INJECTION, SOLUTION INTRAMUSCULAR at 10:07

## 2019-07-24 ENCOUNTER — CLINICAL SUPPORT (OUTPATIENT)
Dept: UROLOGY | Facility: CLINIC | Age: 74
End: 2019-07-24
Payer: MEDICARE

## 2019-07-24 DIAGNOSIS — E29.1 MALE HYPOGONADISM: Primary | ICD-10-CM

## 2019-07-24 PROCEDURE — 99999 PR PBB SHADOW E&M-EST. PATIENT-LVL I: ICD-10-PCS | Mod: PBBFAC,,,

## 2019-07-24 PROCEDURE — 99999 PR PBB SHADOW E&M-EST. PATIENT-LVL I: CPT | Mod: PBBFAC,,,

## 2019-07-24 PROCEDURE — 96372 THER/PROPH/DIAG INJ SC/IM: CPT | Mod: PBBFAC,PO

## 2019-07-24 PROCEDURE — 99211 OFF/OP EST MAY X REQ PHY/QHP: CPT | Mod: PBBFAC,PO

## 2019-07-24 RX ADMIN — TESTOSTERONE CYPIONATE 150 MG: 200 INJECTION, SOLUTION INTRAMUSCULAR at 10:07

## 2019-08-07 ENCOUNTER — CLINICAL SUPPORT (OUTPATIENT)
Dept: UROLOGY | Facility: CLINIC | Age: 74
End: 2019-08-07
Payer: MEDICARE

## 2019-08-07 DIAGNOSIS — E29.1 MALE HYPOGONADISM: Primary | ICD-10-CM

## 2019-08-07 PROCEDURE — 99999 PR PBB SHADOW E&M-EST. PATIENT-LVL II: ICD-10-PCS | Mod: PBBFAC,,,

## 2019-08-07 PROCEDURE — 99212 OFFICE O/P EST SF 10 MIN: CPT | Mod: PBBFAC,PO

## 2019-08-07 PROCEDURE — 99999 PR PBB SHADOW E&M-EST. PATIENT-LVL II: CPT | Mod: PBBFAC,,,

## 2019-08-07 PROCEDURE — 96372 THER/PROPH/DIAG INJ SC/IM: CPT | Mod: PBBFAC,PO

## 2019-08-07 RX ADMIN — TESTOSTERONE CYPIONATE 150 MG: 200 INJECTION, SOLUTION INTRAMUSCULAR at 10:08

## 2019-08-21 ENCOUNTER — CLINICAL SUPPORT (OUTPATIENT)
Dept: UROLOGY | Facility: CLINIC | Age: 74
End: 2019-08-21
Payer: MEDICARE

## 2019-08-21 DIAGNOSIS — E29.1 MALE HYPOGONADISM: Primary | ICD-10-CM

## 2019-08-21 PROCEDURE — 99212 OFFICE O/P EST SF 10 MIN: CPT | Mod: PBBFAC,PO

## 2019-08-21 PROCEDURE — 99999 PR PBB SHADOW E&M-EST. PATIENT-LVL II: CPT | Mod: PBBFAC,,,

## 2019-08-21 PROCEDURE — 96372 THER/PROPH/DIAG INJ SC/IM: CPT | Mod: PBBFAC,PO

## 2019-08-21 PROCEDURE — 99999 PR PBB SHADOW E&M-EST. PATIENT-LVL II: ICD-10-PCS | Mod: PBBFAC,,,

## 2019-08-21 RX ORDER — TESTOSTERONE CYPIONATE 200 MG/ML
150 INJECTION, SOLUTION INTRAMUSCULAR
Status: COMPLETED | OUTPATIENT
Start: 2019-08-21 | End: 2019-10-30

## 2019-08-21 RX ADMIN — TESTOSTERONE CYPIONATE 150 MG: 200 INJECTION, SOLUTION INTRAMUSCULAR at 10:08

## 2019-09-04 ENCOUNTER — CLINICAL SUPPORT (OUTPATIENT)
Dept: UROLOGY | Facility: CLINIC | Age: 74
End: 2019-09-04
Payer: MEDICARE

## 2019-09-04 DIAGNOSIS — E29.1 MALE HYPOGONADISM: Primary | ICD-10-CM

## 2019-09-04 PROCEDURE — 99999 PR PBB SHADOW E&M-EST. PATIENT-LVL I: ICD-10-PCS | Mod: PBBFAC,,,

## 2019-09-04 PROCEDURE — 96372 THER/PROPH/DIAG INJ SC/IM: CPT | Mod: PBBFAC,PO

## 2019-09-04 PROCEDURE — 99999 PR PBB SHADOW E&M-EST. PATIENT-LVL I: CPT | Mod: PBBFAC,,,

## 2019-09-04 PROCEDURE — 99211 OFF/OP EST MAY X REQ PHY/QHP: CPT | Mod: PBBFAC,PO

## 2019-09-04 RX ADMIN — TESTOSTERONE CYPIONATE 150 MG: 200 INJECTION, SOLUTION INTRAMUSCULAR at 10:09

## 2019-09-07 ENCOUNTER — LAB VISIT (OUTPATIENT)
Dept: LAB | Facility: HOSPITAL | Age: 74
End: 2019-09-07
Attending: UROLOGY
Payer: MEDICARE

## 2019-09-07 DIAGNOSIS — Z12.5 SCREENING FOR PROSTATE CANCER: ICD-10-CM

## 2019-09-07 DIAGNOSIS — E29.1 MALE HYPOGONADISM: ICD-10-CM

## 2019-09-07 LAB
COMPLEXED PSA SERPL-MCNC: 1.6 NG/ML (ref 0–4)
HCT VFR BLD AUTO: 51.6 % (ref 40–54)
HGB BLD-MCNC: 16.7 G/DL (ref 14–18)
TESTOST SERPL-MCNC: 1102 NG/DL (ref 304–1227)

## 2019-09-07 PROCEDURE — 85018 HEMOGLOBIN: CPT

## 2019-09-07 PROCEDURE — 84403 ASSAY OF TOTAL TESTOSTERONE: CPT

## 2019-09-07 PROCEDURE — 85014 HEMATOCRIT: CPT

## 2019-09-07 PROCEDURE — 84153 ASSAY OF PSA TOTAL: CPT

## 2019-09-07 PROCEDURE — 36415 COLL VENOUS BLD VENIPUNCTURE: CPT | Mod: PO

## 2019-09-18 ENCOUNTER — CLINICAL SUPPORT (OUTPATIENT)
Dept: UROLOGY | Facility: CLINIC | Age: 74
End: 2019-09-18
Payer: MEDICARE

## 2019-09-18 DIAGNOSIS — E29.1 MALE HYPOGONADISM: Primary | ICD-10-CM

## 2019-09-18 PROCEDURE — 99999 PR PBB SHADOW E&M-EST. PATIENT-LVL I: ICD-10-PCS | Mod: PBBFAC,,,

## 2019-09-18 PROCEDURE — 99211 OFF/OP EST MAY X REQ PHY/QHP: CPT | Mod: PBBFAC,PO,25

## 2019-09-18 PROCEDURE — 96372 THER/PROPH/DIAG INJ SC/IM: CPT | Mod: PBBFAC,PO

## 2019-09-18 PROCEDURE — 99999 PR PBB SHADOW E&M-EST. PATIENT-LVL I: CPT | Mod: PBBFAC,,,

## 2019-09-18 RX ADMIN — TESTOSTERONE CYPIONATE 150 MG: 200 INJECTION, SOLUTION INTRAMUSCULAR at 10:09

## 2019-09-25 ENCOUNTER — OFFICE VISIT (OUTPATIENT)
Dept: UROLOGY | Facility: CLINIC | Age: 74
End: 2019-09-25
Payer: MEDICARE

## 2019-09-25 VITALS
WEIGHT: 165.38 LBS | HEART RATE: 73 BPM | HEIGHT: 67 IN | SYSTOLIC BLOOD PRESSURE: 134 MMHG | DIASTOLIC BLOOD PRESSURE: 80 MMHG | BODY MASS INDEX: 25.96 KG/M2

## 2019-09-25 DIAGNOSIS — E29.1 MALE HYPOGONADISM: Primary | ICD-10-CM

## 2019-09-25 LAB
BILIRUB SERPL-MCNC: ABNORMAL MG/DL
BLOOD URINE, POC: ABNORMAL
COLOR, POC UA: ABNORMAL
GLUCOSE UR QL STRIP: ABNORMAL
KETONES UR QL STRIP: ABNORMAL
LEUKOCYTE ESTERASE URINE, POC: ABNORMAL
NITRITE, POC UA: ABNORMAL
PH, POC UA: 5.5
PROTEIN, POC: ABNORMAL
SPECIFIC GRAVITY, POC UA: 1025
UROBILINOGEN, POC UA: ABNORMAL

## 2019-09-25 PROCEDURE — 99213 OFFICE O/P EST LOW 20 MIN: CPT | Mod: PBBFAC,PO | Performed by: UROLOGY

## 2019-09-25 PROCEDURE — 99999 PR PBB SHADOW E&M-EST. PATIENT-LVL III: ICD-10-PCS | Mod: PBBFAC,,, | Performed by: UROLOGY

## 2019-09-25 PROCEDURE — 99999 PR PBB SHADOW E&M-EST. PATIENT-LVL III: CPT | Mod: PBBFAC,,, | Performed by: UROLOGY

## 2019-09-25 PROCEDURE — 99213 OFFICE O/P EST LOW 20 MIN: CPT | Mod: S$PBB,,, | Performed by: UROLOGY

## 2019-09-25 PROCEDURE — 81002 URINALYSIS NONAUTO W/O SCOPE: CPT | Mod: PBBFAC,PO | Performed by: UROLOGY

## 2019-09-25 PROCEDURE — 99213 PR OFFICE/OUTPT VISIT, EST, LEVL III, 20-29 MIN: ICD-10-PCS | Mod: S$PBB,,, | Performed by: UROLOGY

## 2019-09-25 RX ORDER — CETIRIZINE HYDROCHLORIDE 10 MG/1
10 TABLET ORAL DAILY
COMMUNITY
End: 2021-02-09 | Stop reason: CLARIF

## 2019-09-25 NOTE — PROGRESS NOTES
Subjective:       Patient ID: Mohit Schneider is a 74 y.o. male.    Chief Complaint: Low Testosterone (6 month follow up)    HPI     74 year old with low testosterone.  He has done well on IM testosterone replacement.  He is here for follow up and lab check.   His current dose is 150 mg every 2 weeks.  He has increased energy level. His peak testosterone is 1102. H&H are normal.   PSA 1.6.  Current dose is 150mg every 2 weeks.  He has no bothersome voiding symptoms.    Urine dipstick shows negative for nitrites, leukocytes, glucose, protein, positive for 2+ blood      Review of Systems   Constitutional: Negative for fever.   Genitourinary: Negative for dysuria and hematuria.       Objective:      Physical Exam   Constitutional: He is oriented to person, place, and time. He appears well-developed and well-nourished.   Pulmonary/Chest: Effort normal.   Neurological: He is alert and oriented to person, place, and time.   Skin: No rash noted.   Psychiatric: He has a normal mood and affect.   Vitals reviewed.      Assessment:       1. Male hypogonadism        Plan:       Male hypogonadism  -     POCT URINE DIPSTICK WITHOUT MICROSCOPE  -     Testosterone; Future; Expected date: 09/25/2019  -     Hemoglobin; Future; Expected date: 03/27/2020  -     Hematocrit; Future; Expected date: 03/27/2020      he is stable at this dose for years.  Will continue testosterone follow-up in 1 year.  We will repeat labs in 6 months

## 2019-10-04 ENCOUNTER — CLINICAL SUPPORT (OUTPATIENT)
Dept: UROLOGY | Facility: CLINIC | Age: 74
End: 2019-10-04
Payer: MEDICARE

## 2019-10-04 DIAGNOSIS — E29.1 MALE HYPOGONADISM: Primary | ICD-10-CM

## 2019-10-04 PROCEDURE — 99999 PR PBB SHADOW E&M-EST. PATIENT-LVL II: ICD-10-PCS | Mod: PBBFAC,,,

## 2019-10-04 PROCEDURE — 99999 PR PBB SHADOW E&M-EST. PATIENT-LVL II: CPT | Mod: PBBFAC,,,

## 2019-10-04 PROCEDURE — 99212 OFFICE O/P EST SF 10 MIN: CPT | Mod: PBBFAC,PO,25

## 2019-10-04 PROCEDURE — 96372 THER/PROPH/DIAG INJ SC/IM: CPT | Mod: PBBFAC,PO

## 2019-10-04 RX ADMIN — TESTOSTERONE CYPIONATE 150 MG: 200 INJECTION, SOLUTION INTRAMUSCULAR at 11:10

## 2019-10-16 ENCOUNTER — CLINICAL SUPPORT (OUTPATIENT)
Dept: UROLOGY | Facility: CLINIC | Age: 74
End: 2019-10-16
Payer: MEDICARE

## 2019-10-16 DIAGNOSIS — E29.1 MALE HYPOGONADISM: Primary | ICD-10-CM

## 2019-10-16 PROCEDURE — 99999 PR PBB SHADOW E&M-EST. PATIENT-LVL II: CPT | Mod: PBBFAC,,,

## 2019-10-16 PROCEDURE — 99212 OFFICE O/P EST SF 10 MIN: CPT | Mod: PBBFAC,PO

## 2019-10-16 PROCEDURE — 96372 THER/PROPH/DIAG INJ SC/IM: CPT | Mod: PBBFAC,PO

## 2019-10-16 PROCEDURE — 99999 PR PBB SHADOW E&M-EST. PATIENT-LVL II: ICD-10-PCS | Mod: PBBFAC,,,

## 2019-10-16 RX ADMIN — TESTOSTERONE CYPIONATE 150 MG: 200 INJECTION, SOLUTION INTRAMUSCULAR at 10:10

## 2019-10-30 ENCOUNTER — CLINICAL SUPPORT (OUTPATIENT)
Dept: UROLOGY | Facility: CLINIC | Age: 74
End: 2019-10-30
Payer: MEDICARE

## 2019-10-30 DIAGNOSIS — E29.1 MALE HYPOGONADISM: Primary | ICD-10-CM

## 2019-10-30 PROCEDURE — 96372 THER/PROPH/DIAG INJ SC/IM: CPT | Mod: PBBFAC,PO

## 2019-10-30 PROCEDURE — 99999 PR PBB SHADOW E&M-EST. PATIENT-LVL II: ICD-10-PCS | Mod: PBBFAC,,,

## 2019-10-30 PROCEDURE — 99212 OFFICE O/P EST SF 10 MIN: CPT | Mod: PBBFAC,PO

## 2019-10-30 PROCEDURE — 99999 PR PBB SHADOW E&M-EST. PATIENT-LVL II: CPT | Mod: PBBFAC,,,

## 2019-10-30 RX ORDER — TESTOSTERONE CYPIONATE 200 MG/ML
150 INJECTION, SOLUTION INTRAMUSCULAR
Status: COMPLETED | OUTPATIENT
Start: 2019-11-13 | End: 2020-01-22

## 2019-10-30 RX ADMIN — TESTOSTERONE CYPIONATE 150 MG: 200 INJECTION, SOLUTION INTRAMUSCULAR at 11:10

## 2019-11-13 ENCOUNTER — CLINICAL SUPPORT (OUTPATIENT)
Dept: UROLOGY | Facility: CLINIC | Age: 74
End: 2019-11-13
Payer: MEDICARE

## 2019-11-13 DIAGNOSIS — E29.1 MALE HYPOGONADISM: Primary | ICD-10-CM

## 2019-11-13 PROCEDURE — 99999 PR PBB SHADOW E&M-EST. PATIENT-LVL II: ICD-10-PCS | Mod: PBBFAC,,,

## 2019-11-13 PROCEDURE — 99212 OFFICE O/P EST SF 10 MIN: CPT | Mod: PBBFAC,PO

## 2019-11-13 PROCEDURE — 96372 THER/PROPH/DIAG INJ SC/IM: CPT | Mod: PBBFAC,PO

## 2019-11-13 PROCEDURE — 99999 PR PBB SHADOW E&M-EST. PATIENT-LVL II: CPT | Mod: PBBFAC,,,

## 2019-11-13 RX ADMIN — TESTOSTERONE CYPIONATE 150 MG: 200 INJECTION, SOLUTION INTRAMUSCULAR at 10:11

## 2019-11-27 ENCOUNTER — CLINICAL SUPPORT (OUTPATIENT)
Dept: UROLOGY | Facility: CLINIC | Age: 74
End: 2019-11-27
Payer: MEDICARE

## 2019-11-27 DIAGNOSIS — E29.1 MALE HYPOGONADISM: Primary | ICD-10-CM

## 2019-11-27 PROCEDURE — 99999 PR PBB SHADOW E&M-EST. PATIENT-LVL II: ICD-10-PCS | Mod: PBBFAC,,,

## 2019-11-27 PROCEDURE — 96372 THER/PROPH/DIAG INJ SC/IM: CPT | Mod: PBBFAC,PO

## 2019-11-27 PROCEDURE — 99212 OFFICE O/P EST SF 10 MIN: CPT | Mod: PBBFAC,PO

## 2019-11-27 PROCEDURE — 99999 PR PBB SHADOW E&M-EST. PATIENT-LVL II: CPT | Mod: PBBFAC,,,

## 2019-11-27 RX ADMIN — TESTOSTERONE CYPIONATE 150 MG: 200 INJECTION, SOLUTION INTRAMUSCULAR at 09:11

## 2019-11-27 NOTE — PROGRESS NOTES
PATIENT RECEIVED TESTOSTERONE CYPIONATE INJECTION 150MG IN LEFT BUTTOCKS. PATIENT TOLERATED WELL.

## 2019-12-11 ENCOUNTER — CLINICAL SUPPORT (OUTPATIENT)
Dept: UROLOGY | Facility: CLINIC | Age: 74
End: 2019-12-11
Payer: MEDICARE

## 2019-12-11 DIAGNOSIS — E29.1 MALE HYPOGONADISM: Primary | ICD-10-CM

## 2019-12-11 PROCEDURE — 99999 PR PBB SHADOW E&M-EST. PATIENT-LVL II: CPT | Mod: PBBFAC,,,

## 2019-12-11 PROCEDURE — 99999 PR PBB SHADOW E&M-EST. PATIENT-LVL II: ICD-10-PCS | Mod: PBBFAC,,,

## 2019-12-11 PROCEDURE — 99212 OFFICE O/P EST SF 10 MIN: CPT | Mod: PBBFAC,PO

## 2019-12-11 PROCEDURE — 96372 THER/PROPH/DIAG INJ SC/IM: CPT | Mod: PBBFAC,PO

## 2019-12-11 RX ADMIN — TESTOSTERONE CYPIONATE 150 MG: 200 INJECTION, SOLUTION INTRAMUSCULAR at 10:12

## 2019-12-27 ENCOUNTER — CLINICAL SUPPORT (OUTPATIENT)
Dept: UROLOGY | Facility: CLINIC | Age: 74
End: 2019-12-27
Payer: MEDICARE

## 2019-12-27 DIAGNOSIS — E29.1 MALE HYPOGONADISM: Primary | ICD-10-CM

## 2019-12-27 PROCEDURE — 99212 OFFICE O/P EST SF 10 MIN: CPT | Mod: PBBFAC,PO,25

## 2019-12-27 PROCEDURE — 99999 PR PBB SHADOW E&M-EST. PATIENT-LVL II: CPT | Mod: PBBFAC,,,

## 2019-12-27 PROCEDURE — 99999 PR PBB SHADOW E&M-EST. PATIENT-LVL II: ICD-10-PCS | Mod: PBBFAC,,,

## 2019-12-27 PROCEDURE — 96372 THER/PROPH/DIAG INJ SC/IM: CPT | Mod: PBBFAC,PO

## 2019-12-27 RX ADMIN — TESTOSTERONE CYPIONATE 150 MG: 200 INJECTION, SOLUTION INTRAMUSCULAR at 10:12

## 2020-01-08 ENCOUNTER — CLINICAL SUPPORT (OUTPATIENT)
Dept: UROLOGY | Facility: CLINIC | Age: 75
End: 2020-01-08
Payer: MEDICARE

## 2020-01-08 DIAGNOSIS — E29.1 MALE HYPOGONADISM: Primary | ICD-10-CM

## 2020-01-08 PROCEDURE — 99211 OFF/OP EST MAY X REQ PHY/QHP: CPT | Mod: PBBFAC,PO

## 2020-01-08 PROCEDURE — 96372 THER/PROPH/DIAG INJ SC/IM: CPT | Mod: PBBFAC,PO

## 2020-01-08 PROCEDURE — 99999 PR PBB SHADOW E&M-EST. PATIENT-LVL I: CPT | Mod: PBBFAC,,,

## 2020-01-08 PROCEDURE — 99999 PR PBB SHADOW E&M-EST. PATIENT-LVL I: ICD-10-PCS | Mod: PBBFAC,,,

## 2020-01-08 RX ADMIN — TESTOSTERONE CYPIONATE 150 MG: 200 INJECTION, SOLUTION INTRAMUSCULAR at 10:01

## 2020-01-22 ENCOUNTER — CLINICAL SUPPORT (OUTPATIENT)
Dept: UROLOGY | Facility: CLINIC | Age: 75
End: 2020-01-22
Payer: MEDICARE

## 2020-01-22 DIAGNOSIS — E29.1 MALE HYPOGONADISM: Primary | ICD-10-CM

## 2020-01-22 PROCEDURE — 99999 PR PBB SHADOW E&M-EST. PATIENT-LVL II: ICD-10-PCS | Mod: PBBFAC,,,

## 2020-01-22 PROCEDURE — 99212 OFFICE O/P EST SF 10 MIN: CPT | Mod: PBBFAC,PO,25

## 2020-01-22 PROCEDURE — 99999 PR PBB SHADOW E&M-EST. PATIENT-LVL II: CPT | Mod: PBBFAC,,,

## 2020-01-22 PROCEDURE — 96372 THER/PROPH/DIAG INJ SC/IM: CPT | Mod: PBBFAC,PO

## 2020-01-22 RX ORDER — TESTOSTERONE CYPIONATE 200 MG/ML
150 INJECTION, SOLUTION INTRAMUSCULAR
Status: SHIPPED | OUTPATIENT
Start: 2020-02-05 | End: 2020-04-29

## 2020-01-22 RX ADMIN — TESTOSTERONE CYPIONATE 150 MG: 200 INJECTION, SOLUTION INTRAMUSCULAR at 11:01

## 2020-02-05 ENCOUNTER — CLINICAL SUPPORT (OUTPATIENT)
Dept: UROLOGY | Facility: CLINIC | Age: 75
End: 2020-02-05
Payer: MEDICARE

## 2020-02-05 DIAGNOSIS — E29.1 MALE HYPOGONADISM: Primary | ICD-10-CM

## 2020-02-05 PROCEDURE — 99999 PR PBB SHADOW E&M-EST. PATIENT-LVL I: ICD-10-PCS | Mod: PBBFAC,,,

## 2020-02-05 PROCEDURE — 96372 THER/PROPH/DIAG INJ SC/IM: CPT | Mod: PBBFAC,PO

## 2020-02-05 PROCEDURE — 99211 OFF/OP EST MAY X REQ PHY/QHP: CPT | Mod: PBBFAC,PO

## 2020-02-05 PROCEDURE — 99999 PR PBB SHADOW E&M-EST. PATIENT-LVL I: CPT | Mod: PBBFAC,,,

## 2020-02-05 RX ADMIN — TESTOSTERONE CYPIONATE 150 MG: 200 INJECTION, SOLUTION INTRAMUSCULAR at 01:02

## 2020-02-19 ENCOUNTER — CLINICAL SUPPORT (OUTPATIENT)
Dept: UROLOGY | Facility: CLINIC | Age: 75
End: 2020-02-19
Payer: MEDICARE

## 2020-02-19 DIAGNOSIS — E29.1 MALE HYPOGONADISM: Primary | ICD-10-CM

## 2020-02-19 PROCEDURE — 99212 OFFICE O/P EST SF 10 MIN: CPT | Mod: PBBFAC,PO

## 2020-02-19 PROCEDURE — 99999 PR PBB SHADOW E&M-EST. PATIENT-LVL II: CPT | Mod: PBBFAC,,,

## 2020-02-19 PROCEDURE — 96372 THER/PROPH/DIAG INJ SC/IM: CPT | Mod: PBBFAC,PO

## 2020-02-19 PROCEDURE — 99999 PR PBB SHADOW E&M-EST. PATIENT-LVL II: ICD-10-PCS | Mod: PBBFAC,,,

## 2020-02-19 RX ADMIN — TESTOSTERONE CYPIONATE 150 MG: 200 INJECTION, SOLUTION INTRAMUSCULAR at 09:02

## 2020-03-04 ENCOUNTER — CLINICAL SUPPORT (OUTPATIENT)
Dept: UROLOGY | Facility: CLINIC | Age: 75
End: 2020-03-04
Payer: MEDICARE

## 2020-03-04 DIAGNOSIS — E29.1 MALE HYPOGONADISM: Primary | ICD-10-CM

## 2020-03-04 PROCEDURE — 96372 THER/PROPH/DIAG INJ SC/IM: CPT | Mod: PBBFAC,PO

## 2020-03-04 PROCEDURE — 99999 PR PBB SHADOW E&M-EST. PATIENT-LVL I: ICD-10-PCS | Mod: PBBFAC,,,

## 2020-03-04 PROCEDURE — 99211 OFF/OP EST MAY X REQ PHY/QHP: CPT | Mod: PBBFAC,PO,25

## 2020-03-04 PROCEDURE — 99999 PR PBB SHADOW E&M-EST. PATIENT-LVL I: CPT | Mod: PBBFAC,,,

## 2020-03-04 RX ADMIN — TESTOSTERONE CYPIONATE 150 MG: 200 INJECTION, SOLUTION INTRAMUSCULAR at 09:03

## 2020-03-18 ENCOUNTER — CLINICAL SUPPORT (OUTPATIENT)
Dept: UROLOGY | Facility: CLINIC | Age: 75
End: 2020-03-18
Payer: MEDICARE

## 2020-03-18 DIAGNOSIS — E29.1 MALE HYPOGONADISM: Primary | ICD-10-CM

## 2020-03-18 PROCEDURE — 99212 OFFICE O/P EST SF 10 MIN: CPT | Mod: PBBFAC,PO

## 2020-03-18 PROCEDURE — 96372 THER/PROPH/DIAG INJ SC/IM: CPT | Mod: PBBFAC,PO

## 2020-03-18 PROCEDURE — 99999 PR PBB SHADOW E&M-EST. PATIENT-LVL II: CPT | Mod: PBBFAC,,,

## 2020-03-18 PROCEDURE — 99999 PR PBB SHADOW E&M-EST. PATIENT-LVL II: ICD-10-PCS | Mod: PBBFAC,,,

## 2020-03-18 RX ADMIN — TESTOSTERONE CYPIONATE 150 MG: 200 INJECTION, SOLUTION INTRAMUSCULAR at 10:03

## 2020-03-18 NOTE — PROGRESS NOTES
Testosterone injection given 150mg, see MAR. Patient tolerated well, recommended to wait 20 minutes after injection to observe for adverse reaction, patient declined to wait.'

## 2020-03-26 ENCOUNTER — TELEPHONE (OUTPATIENT)
Dept: UROLOGY | Facility: CLINIC | Age: 75
End: 2020-03-26

## 2020-03-26 NOTE — TELEPHONE ENCOUNTER
----- Message from Sterling Whitehead sent at 3/26/2020  3:39 PM CDT -----  Contact: Patient  Type:  Patient Returning Call    Who Called:  Patient  Who Left Message for Patient:  unknown  Does the patient know what this is regarding?:  Unknown, no note/message  Best Call Back Number:  [phone]  Additional Information:  NA

## 2020-05-06 ENCOUNTER — CLINICAL SUPPORT (OUTPATIENT)
Dept: UROLOGY | Facility: CLINIC | Age: 75
End: 2020-05-06
Payer: MEDICARE

## 2020-05-06 DIAGNOSIS — E29.1 MALE HYPOGONADISM: Primary | ICD-10-CM

## 2020-05-06 PROCEDURE — 99999 PR PBB SHADOW E&M-EST. PATIENT-LVL II: CPT | Mod: PBBFAC,,,

## 2020-05-06 PROCEDURE — 99999 PR PBB SHADOW E&M-EST. PATIENT-LVL II: ICD-10-PCS | Mod: PBBFAC,,,

## 2020-05-06 PROCEDURE — 96372 THER/PROPH/DIAG INJ SC/IM: CPT | Mod: PBBFAC,PO

## 2020-05-06 RX ORDER — TESTOSTERONE CYPIONATE 200 MG/ML
150 INJECTION, SOLUTION INTRAMUSCULAR
Status: COMPLETED | OUTPATIENT
Start: 2020-05-06 | End: 2020-07-14

## 2020-05-06 RX ADMIN — TESTOSTERONE CYPIONATE 150 MG: 200 INJECTION, SOLUTION INTRAMUSCULAR at 10:05

## 2020-05-06 NOTE — PROGRESS NOTES
Testosterone injection given 200mg, see MAR. Patient tolerated well, recommended to wait 20 minutes after injection to observe for adverse reaction, patient declined to wait.

## 2020-05-20 ENCOUNTER — CLINICAL SUPPORT (OUTPATIENT)
Dept: UROLOGY | Facility: CLINIC | Age: 75
End: 2020-05-20
Payer: MEDICARE

## 2020-05-20 DIAGNOSIS — E29.1 MALE HYPOGONADISM: Primary | ICD-10-CM

## 2020-05-20 PROCEDURE — 99999 PR PBB SHADOW E&M-EST. PATIENT-LVL I: CPT | Mod: PBBFAC,,,

## 2020-05-20 PROCEDURE — 99999 PR PBB SHADOW E&M-EST. PATIENT-LVL I: ICD-10-PCS | Mod: PBBFAC,,,

## 2020-05-20 PROCEDURE — 96372 THER/PROPH/DIAG INJ SC/IM: CPT | Mod: PBBFAC,PO

## 2020-05-20 RX ADMIN — TESTOSTERONE CYPIONATE 150 MG: 200 INJECTION, SOLUTION INTRAMUSCULAR at 10:05

## 2020-06-02 ENCOUNTER — CLINICAL SUPPORT (OUTPATIENT)
Dept: UROLOGY | Facility: CLINIC | Age: 75
End: 2020-06-02
Payer: MEDICARE

## 2020-06-02 DIAGNOSIS — E29.1 MALE HYPOGONADISM: Primary | ICD-10-CM

## 2020-06-02 PROCEDURE — 99999 PR PBB SHADOW E&M-EST. PATIENT-LVL II: CPT | Mod: PBBFAC,,,

## 2020-06-02 PROCEDURE — 96372 THER/PROPH/DIAG INJ SC/IM: CPT | Mod: PBBFAC,PO

## 2020-06-02 PROCEDURE — 99999 PR PBB SHADOW E&M-EST. PATIENT-LVL II: ICD-10-PCS | Mod: PBBFAC,,,

## 2020-06-02 RX ADMIN — TESTOSTERONE CYPIONATE 150 MG: 200 INJECTION, SOLUTION INTRAMUSCULAR at 10:06

## 2020-06-16 ENCOUNTER — CLINICAL SUPPORT (OUTPATIENT)
Dept: UROLOGY | Facility: CLINIC | Age: 75
End: 2020-06-16
Payer: MEDICARE

## 2020-06-16 DIAGNOSIS — E29.1 MALE HYPOGONADISM: Primary | ICD-10-CM

## 2020-06-16 PROCEDURE — 96372 THER/PROPH/DIAG INJ SC/IM: CPT | Mod: PBBFAC,PO

## 2020-06-16 PROCEDURE — 99999 PR PBB SHADOW E&M-EST. PATIENT-LVL II: CPT | Mod: PBBFAC,,,

## 2020-06-16 PROCEDURE — 99999 PR PBB SHADOW E&M-EST. PATIENT-LVL II: ICD-10-PCS | Mod: PBBFAC,,,

## 2020-06-16 RX ADMIN — TESTOSTERONE CYPIONATE 150 MG: 200 INJECTION, SOLUTION INTRAMUSCULAR at 10:06

## 2020-06-29 ENCOUNTER — CLINICAL SUPPORT (OUTPATIENT)
Dept: UROLOGY | Facility: CLINIC | Age: 75
End: 2020-06-29
Payer: MEDICARE

## 2020-06-29 DIAGNOSIS — E29.1 MALE HYPOGONADISM: Primary | ICD-10-CM

## 2020-06-29 PROCEDURE — 99999 PR PBB SHADOW E&M-EST. PATIENT-LVL II: CPT | Mod: PBBFAC,,,

## 2020-06-29 PROCEDURE — 96372 THER/PROPH/DIAG INJ SC/IM: CPT | Mod: PBBFAC,PO

## 2020-06-29 PROCEDURE — 99999 PR PBB SHADOW E&M-EST. PATIENT-LVL II: ICD-10-PCS | Mod: PBBFAC,,,

## 2020-06-29 RX ORDER — TESTOSTERONE CYPIONATE 200 MG/ML
150 INJECTION, SOLUTION INTRAMUSCULAR
Status: COMPLETED | OUTPATIENT
Start: 2020-07-28 | End: 2020-11-10

## 2020-06-29 RX ADMIN — TESTOSTERONE CYPIONATE 150 MG: 200 INJECTION, SOLUTION INTRAMUSCULAR at 10:06

## 2020-07-14 ENCOUNTER — CLINICAL SUPPORT (OUTPATIENT)
Dept: UROLOGY | Facility: CLINIC | Age: 75
End: 2020-07-14
Payer: MEDICARE

## 2020-07-14 DIAGNOSIS — E29.1 MALE HYPOGONADISM: Primary | ICD-10-CM

## 2020-07-14 PROCEDURE — 96372 THER/PROPH/DIAG INJ SC/IM: CPT | Mod: PBBFAC,PO

## 2020-07-14 PROCEDURE — 99999 PR PBB SHADOW E&M-EST. PATIENT-LVL III: CPT | Mod: PBBFAC,,,

## 2020-07-14 PROCEDURE — 99999 PR PBB SHADOW E&M-EST. PATIENT-LVL III: ICD-10-PCS | Mod: PBBFAC,,,

## 2020-07-14 RX ADMIN — TESTOSTERONE CYPIONATE 150 MG: 200 INJECTION, SOLUTION INTRAMUSCULAR at 10:07

## 2020-07-28 ENCOUNTER — CLINICAL SUPPORT (OUTPATIENT)
Dept: UROLOGY | Facility: CLINIC | Age: 75
End: 2020-07-28
Payer: MEDICARE

## 2020-07-28 DIAGNOSIS — E29.1 MALE HYPOGONADISM: Primary | ICD-10-CM

## 2020-07-28 PROCEDURE — 96372 THER/PROPH/DIAG INJ SC/IM: CPT | Mod: PBBFAC,PO

## 2020-07-28 PROCEDURE — 99999 PR PBB SHADOW E&M-EST. PATIENT-LVL II: CPT | Mod: PBBFAC,,,

## 2020-07-28 PROCEDURE — 99999 PR PBB SHADOW E&M-EST. PATIENT-LVL II: ICD-10-PCS | Mod: PBBFAC,,,

## 2020-07-28 RX ADMIN — TESTOSTERONE CYPIONATE 150 MG: 200 INJECTION, SOLUTION INTRAMUSCULAR at 10:07

## 2020-08-10 ENCOUNTER — CLINICAL SUPPORT (OUTPATIENT)
Dept: UROLOGY | Facility: CLINIC | Age: 75
End: 2020-08-10
Payer: MEDICARE

## 2020-08-10 DIAGNOSIS — E29.1 MALE HYPOGONADISM: Primary | ICD-10-CM

## 2020-08-10 PROCEDURE — 99999 PR PBB SHADOW E&M-EST. PATIENT-LVL I: CPT | Mod: PBBFAC,,,

## 2020-08-10 PROCEDURE — 99999 PR PBB SHADOW E&M-EST. PATIENT-LVL I: ICD-10-PCS | Mod: PBBFAC,,,

## 2020-08-10 PROCEDURE — 96372 THER/PROPH/DIAG INJ SC/IM: CPT | Mod: PBBFAC,PO

## 2020-08-10 RX ADMIN — TESTOSTERONE CYPIONATE 150 MG: 200 INJECTION, SOLUTION INTRAMUSCULAR at 10:08

## 2020-08-10 NOTE — PROGRESS NOTES
Pt received 150 mg testosterone injection IM in RUQ of buttocks. Pt tolerated procedure well. Pt refused to stay for observation. Pt verbalized understand of the risk with leaving the clinic.       normal (ped)...

## 2020-08-25 ENCOUNTER — CLINICAL SUPPORT (OUTPATIENT)
Dept: UROLOGY | Facility: CLINIC | Age: 75
End: 2020-08-25
Payer: MEDICARE

## 2020-08-25 DIAGNOSIS — E29.1 MALE HYPOGONADISM: Primary | ICD-10-CM

## 2020-08-25 PROCEDURE — 99999 PR PBB SHADOW E&M-EST. PATIENT-LVL I: CPT | Mod: PBBFAC,,,

## 2020-08-25 PROCEDURE — 96372 THER/PROPH/DIAG INJ SC/IM: CPT | Mod: PBBFAC,PO

## 2020-08-25 PROCEDURE — 99999 PR PBB SHADOW E&M-EST. PATIENT-LVL I: ICD-10-PCS | Mod: PBBFAC,,,

## 2020-08-25 RX ADMIN — TESTOSTERONE CYPIONATE 150 MG: 200 INJECTION, SOLUTION INTRAMUSCULAR at 10:08

## 2020-09-08 ENCOUNTER — CLINICAL SUPPORT (OUTPATIENT)
Dept: UROLOGY | Facility: CLINIC | Age: 75
End: 2020-09-08
Payer: MEDICARE

## 2020-09-08 DIAGNOSIS — E29.1 MALE HYPOGONADISM: Primary | ICD-10-CM

## 2020-09-08 PROCEDURE — 96372 THER/PROPH/DIAG INJ SC/IM: CPT | Mod: PBBFAC,PO

## 2020-09-08 PROCEDURE — 99999 PR PBB SHADOW E&M-EST. PATIENT-LVL II: ICD-10-PCS | Mod: PBBFAC,,,

## 2020-09-08 PROCEDURE — 99999 PR PBB SHADOW E&M-EST. PATIENT-LVL II: CPT | Mod: PBBFAC,,,

## 2020-09-08 RX ADMIN — TESTOSTERONE CYPIONATE 150 MG: 200 INJECTION, SOLUTION INTRAMUSCULAR at 10:09

## 2020-09-17 ENCOUNTER — PATIENT MESSAGE (OUTPATIENT)
Dept: UROLOGY | Facility: CLINIC | Age: 75
End: 2020-09-17

## 2020-09-29 ENCOUNTER — CLINICAL SUPPORT (OUTPATIENT)
Dept: UROLOGY | Facility: CLINIC | Age: 75
End: 2020-09-29
Payer: MEDICARE

## 2020-09-29 DIAGNOSIS — N40.0 BPH WITHOUT URINARY OBSTRUCTION: Primary | ICD-10-CM

## 2020-09-29 DIAGNOSIS — E29.1 MALE HYPOGONADISM: Primary | ICD-10-CM

## 2020-09-29 PROCEDURE — 96372 THER/PROPH/DIAG INJ SC/IM: CPT | Mod: PBBFAC,PO

## 2020-09-29 RX ADMIN — TESTOSTERONE CYPIONATE 150 MG: 200 INJECTION, SOLUTION INTRAMUSCULAR at 10:09

## 2020-10-01 ENCOUNTER — PATIENT MESSAGE (OUTPATIENT)
Dept: UROLOGY | Facility: CLINIC | Age: 75
End: 2020-10-01

## 2020-10-02 ENCOUNTER — LAB VISIT (OUTPATIENT)
Dept: LAB | Facility: HOSPITAL | Age: 75
End: 2020-10-02
Attending: UROLOGY
Payer: MEDICARE

## 2020-10-02 DIAGNOSIS — N40.0 BPH WITHOUT URINARY OBSTRUCTION: ICD-10-CM

## 2020-10-02 DIAGNOSIS — E29.1 MALE HYPOGONADISM: ICD-10-CM

## 2020-10-02 LAB
COMPLEXED PSA SERPL-MCNC: 1.3 NG/ML (ref 0–4)
HCT VFR BLD AUTO: 47.6 % (ref 40–54)
HGB BLD-MCNC: 16 G/DL (ref 14–18)
TESTOST SERPL-MCNC: 1195 NG/DL (ref 304–1227)

## 2020-10-02 PROCEDURE — 85018 HEMOGLOBIN: CPT

## 2020-10-02 PROCEDURE — 84403 ASSAY OF TOTAL TESTOSTERONE: CPT

## 2020-10-02 PROCEDURE — 36415 COLL VENOUS BLD VENIPUNCTURE: CPT | Mod: PO

## 2020-10-02 PROCEDURE — 85014 HEMATOCRIT: CPT

## 2020-10-02 PROCEDURE — 84153 ASSAY OF PSA TOTAL: CPT

## 2020-10-12 ENCOUNTER — OFFICE VISIT (OUTPATIENT)
Dept: UROLOGY | Facility: CLINIC | Age: 75
End: 2020-10-12
Payer: MEDICARE

## 2020-10-12 VITALS — BODY MASS INDEX: 25.96 KG/M2 | WEIGHT: 165.38 LBS | HEIGHT: 67 IN

## 2020-10-12 DIAGNOSIS — N40.0 BPH WITHOUT URINARY OBSTRUCTION: ICD-10-CM

## 2020-10-12 DIAGNOSIS — Z12.5 SCREENING FOR PROSTATE CANCER: ICD-10-CM

## 2020-10-12 DIAGNOSIS — E29.1 MALE HYPOGONADISM: Primary | ICD-10-CM

## 2020-10-12 PROCEDURE — 99213 OFFICE O/P EST LOW 20 MIN: CPT | Mod: PBBFAC,PO,25 | Performed by: UROLOGY

## 2020-10-12 PROCEDURE — 99214 PR OFFICE/OUTPT VISIT, EST, LEVL IV, 30-39 MIN: ICD-10-PCS | Mod: S$PBB,,, | Performed by: UROLOGY

## 2020-10-12 PROCEDURE — 96372 THER/PROPH/DIAG INJ SC/IM: CPT | Mod: PBBFAC,PO

## 2020-10-12 PROCEDURE — 99999 PR PBB SHADOW E&M-EST. PATIENT-LVL III: ICD-10-PCS | Mod: PBBFAC,,, | Performed by: UROLOGY

## 2020-10-12 PROCEDURE — 99214 OFFICE O/P EST MOD 30 MIN: CPT | Mod: S$PBB,,, | Performed by: UROLOGY

## 2020-10-12 PROCEDURE — 99999 PR PBB SHADOW E&M-EST. PATIENT-LVL III: CPT | Mod: PBBFAC,,, | Performed by: UROLOGY

## 2020-10-12 RX ORDER — IBUPROFEN 800 MG/1
TABLET ORAL
COMMUNITY
Start: 2020-07-07 | End: 2021-01-28

## 2020-10-12 RX ORDER — CLINDAMYCIN HYDROCHLORIDE 300 MG/1
CAPSULE ORAL
COMMUNITY
Start: 2020-07-20 | End: 2021-01-28

## 2020-10-12 RX ORDER — PANTOPRAZOLE SODIUM 40 MG/1
40 TABLET, DELAYED RELEASE ORAL DAILY
COMMUNITY
Start: 2020-08-11 | End: 2021-10-12

## 2020-10-12 RX ADMIN — TESTOSTERONE CYPIONATE 150 MG: 200 INJECTION, SOLUTION INTRAMUSCULAR at 03:10

## 2020-10-12 NOTE — PROGRESS NOTES
Subjective:       Patient ID: Mohit Schneider is a 75 y.o. male.    Chief Complaint: Annual Exam    HPI     75 year old with low testosterone.  He has done well on IM testosterone replacement.  He is here for follow up and lab check.   His current dose is 150 mg every 2 weeks.  He has increased energy level. His peak testosterone is 1195. H&H are normal.   PSA 1.3.  Current dose is 150mg every 2 weeks.  He has no bothersome voiding symptoms.  He denies hematuria and dysuria.    Review of Systems   Constitutional: Negative for fever.   Genitourinary: Negative for dysuria and hematuria.       Objective:      Physical Exam  Vitals signs reviewed.   Constitutional:       Appearance: He is well-developed.   HENT:      Head: Normocephalic and atraumatic.   Eyes:      Conjunctiva/sclera: Conjunctivae normal.   Cardiovascular:      Rate and Rhythm: Normal rate.   Pulmonary:      Effort: Pulmonary effort is normal.   Genitourinary:     Prostate: Enlarged (40g, s/s/a). Not tender.      Rectum: No mass. Normal anal tone.   Musculoskeletal: Normal range of motion.   Skin:     General: Skin is warm and dry.      Findings: No rash.   Neurological:      Mental Status: He is alert and oriented to person, place, and time.         Assessment:       1. Male hypogonadism    2. BPH without urinary obstruction    3. Screening for prostate cancer        Plan:       Male hypogonadism  -     Prior authorization Order    BPH without urinary obstruction    Screening for prostate cancer      continue testosterone at current dose.  Follow-up 1 year

## 2020-10-27 ENCOUNTER — CLINICAL SUPPORT (OUTPATIENT)
Dept: UROLOGY | Facility: CLINIC | Age: 75
End: 2020-10-27
Payer: MEDICARE

## 2020-10-27 DIAGNOSIS — E29.1 MALE HYPOGONADISM: Primary | ICD-10-CM

## 2020-10-27 PROCEDURE — 99999 PR PBB SHADOW E&M-EST. PATIENT-LVL II: ICD-10-PCS | Mod: PBBFAC,,,

## 2020-10-27 PROCEDURE — 96372 THER/PROPH/DIAG INJ SC/IM: CPT | Mod: PBBFAC,PO

## 2020-10-27 PROCEDURE — 99999 PR PBB SHADOW E&M-EST. PATIENT-LVL II: CPT | Mod: PBBFAC,,,

## 2020-10-27 RX ADMIN — TESTOSTERONE CYPIONATE 150 MG: 200 INJECTION, SOLUTION INTRAMUSCULAR at 02:10

## 2020-11-10 ENCOUNTER — CLINICAL SUPPORT (OUTPATIENT)
Dept: UROLOGY | Facility: CLINIC | Age: 75
End: 2020-11-10
Payer: MEDICARE

## 2020-11-10 DIAGNOSIS — E29.1 MALE HYPOGONADISM: Primary | ICD-10-CM

## 2020-11-10 PROCEDURE — 99999 PR PBB SHADOW E&M-EST. PATIENT-LVL I: ICD-10-PCS | Mod: PBBFAC,,,

## 2020-11-10 PROCEDURE — 96372 THER/PROPH/DIAG INJ SC/IM: CPT | Mod: PBBFAC,PO

## 2020-11-10 PROCEDURE — 99999 PR PBB SHADOW E&M-EST. PATIENT-LVL I: CPT | Mod: PBBFAC,,,

## 2020-11-10 RX ORDER — TESTOSTERONE CYPIONATE 200 MG/ML
150 INJECTION, SOLUTION INTRAMUSCULAR
Status: COMPLETED | OUTPATIENT
Start: 2020-12-01 | End: 2021-02-08

## 2020-11-10 RX ADMIN — TESTOSTERONE CYPIONATE 150 MG: 200 INJECTION, SOLUTION INTRAMUSCULAR at 11:11

## 2020-12-01 ENCOUNTER — CLINICAL SUPPORT (OUTPATIENT)
Dept: UROLOGY | Facility: CLINIC | Age: 75
End: 2020-12-01
Payer: MEDICARE

## 2020-12-01 DIAGNOSIS — E29.1 MALE HYPOGONADISM: Primary | ICD-10-CM

## 2020-12-01 PROCEDURE — 96372 THER/PROPH/DIAG INJ SC/IM: CPT | Mod: PBBFAC,PO

## 2020-12-01 PROCEDURE — 99999 PR PBB SHADOW E&M-EST. PATIENT-LVL III: ICD-10-PCS | Mod: PBBFAC,,,

## 2020-12-01 PROCEDURE — 99999 PR PBB SHADOW E&M-EST. PATIENT-LVL III: CPT | Mod: PBBFAC,,,

## 2020-12-01 RX ADMIN — TESTOSTERONE CYPIONATE 150 MG: 200 INJECTION, SOLUTION INTRAMUSCULAR at 10:12

## 2020-12-15 ENCOUNTER — CLINICAL SUPPORT (OUTPATIENT)
Dept: UROLOGY | Facility: CLINIC | Age: 75
End: 2020-12-15
Payer: MEDICARE

## 2020-12-15 DIAGNOSIS — E29.1 MALE HYPOGONADISM: Primary | ICD-10-CM

## 2020-12-15 PROCEDURE — 99999 PR PBB SHADOW E&M-EST. PATIENT-LVL III: CPT | Mod: PBBFAC,,,

## 2020-12-15 PROCEDURE — 96372 THER/PROPH/DIAG INJ SC/IM: CPT | Mod: PBBFAC,PO

## 2020-12-15 PROCEDURE — 99999 PR PBB SHADOW E&M-EST. PATIENT-LVL III: ICD-10-PCS | Mod: PBBFAC,,,

## 2020-12-15 RX ADMIN — TESTOSTERONE CYPIONATE 150 MG: 200 INJECTION, SOLUTION INTRAMUSCULAR at 11:12

## 2020-12-30 ENCOUNTER — CLINICAL SUPPORT (OUTPATIENT)
Dept: UROLOGY | Facility: CLINIC | Age: 75
End: 2020-12-30
Payer: MEDICARE

## 2020-12-30 DIAGNOSIS — E29.1 MALE HYPOGONADISM: Primary | ICD-10-CM

## 2020-12-30 PROCEDURE — 96372 THER/PROPH/DIAG INJ SC/IM: CPT | Mod: PBBFAC,PO

## 2020-12-30 RX ADMIN — TESTOSTERONE CYPIONATE 150 MG: 200 INJECTION, SOLUTION INTRAMUSCULAR at 02:12

## 2021-01-12 ENCOUNTER — CLINICAL SUPPORT (OUTPATIENT)
Dept: UROLOGY | Facility: CLINIC | Age: 76
End: 2021-01-12
Payer: MEDICARE

## 2021-01-12 DIAGNOSIS — E29.1 MALE HYPOGONADISM: Primary | ICD-10-CM

## 2021-01-12 PROCEDURE — 99999 PR PBB SHADOW E&M-EST. PATIENT-LVL III: ICD-10-PCS | Mod: PBBFAC,,,

## 2021-01-12 PROCEDURE — 96372 THER/PROPH/DIAG INJ SC/IM: CPT | Mod: PBBFAC,PO

## 2021-01-12 PROCEDURE — 99999 PR PBB SHADOW E&M-EST. PATIENT-LVL III: CPT | Mod: PBBFAC,,,

## 2021-01-12 RX ADMIN — TESTOSTERONE CYPIONATE 150 MG: 200 INJECTION, SOLUTION INTRAMUSCULAR at 10:01

## 2021-01-20 DIAGNOSIS — M25.551 RIGHT HIP PAIN: Primary | ICD-10-CM

## 2021-01-26 ENCOUNTER — CLINICAL SUPPORT (OUTPATIENT)
Dept: UROLOGY | Facility: CLINIC | Age: 76
End: 2021-01-26
Payer: MEDICARE

## 2021-01-26 DIAGNOSIS — E29.1 MALE HYPOGONADISM: Primary | ICD-10-CM

## 2021-01-26 PROCEDURE — 99999 PR PBB SHADOW E&M-EST. PATIENT-LVL I: ICD-10-PCS | Mod: PBBFAC,,,

## 2021-01-26 PROCEDURE — 99999 PR PBB SHADOW E&M-EST. PATIENT-LVL I: CPT | Mod: PBBFAC,,,

## 2021-01-26 PROCEDURE — 96372 THER/PROPH/DIAG INJ SC/IM: CPT | Mod: PBBFAC,PO

## 2021-01-26 RX ADMIN — TESTOSTERONE CYPIONATE 150 MG: 200 INJECTION, SOLUTION INTRAMUSCULAR at 10:01

## 2021-01-28 ENCOUNTER — PATIENT MESSAGE (OUTPATIENT)
Dept: ORTHOPEDICS | Facility: CLINIC | Age: 76
End: 2021-01-28

## 2021-01-28 ENCOUNTER — HOSPITAL ENCOUNTER (OUTPATIENT)
Dept: RADIOLOGY | Facility: HOSPITAL | Age: 76
Discharge: HOME | End: 2021-01-28
Attending: ORTHOPAEDIC SURGERY
Payer: MEDICARE

## 2021-01-28 ENCOUNTER — PATIENT MESSAGE (OUTPATIENT)
Dept: UROLOGY | Facility: CLINIC | Age: 76
End: 2021-01-28

## 2021-01-28 ENCOUNTER — OFFICE VISIT (OUTPATIENT)
Dept: ORTHOPEDICS | Facility: CLINIC | Age: 76
End: 2021-01-28
Payer: MEDICARE

## 2021-01-28 VITALS
HEART RATE: 91 BPM | BODY MASS INDEX: 25.9 KG/M2 | DIASTOLIC BLOOD PRESSURE: 85 MMHG | RESPIRATION RATE: 20 BRPM | SYSTOLIC BLOOD PRESSURE: 153 MMHG | HEIGHT: 67 IN | WEIGHT: 165 LBS

## 2021-01-28 DIAGNOSIS — M25.551 RIGHT HIP PAIN: ICD-10-CM

## 2021-01-28 DIAGNOSIS — Z01.818 PRE-OP TESTING: ICD-10-CM

## 2021-01-28 DIAGNOSIS — M16.11 PRIMARY OSTEOARTHRITIS OF RIGHT HIP: Primary | ICD-10-CM

## 2021-01-28 PROCEDURE — 73502 XR HIP 2 VIEW RIGHT: ICD-10-PCS | Mod: 26,RT,, | Performed by: RADIOLOGY

## 2021-01-28 PROCEDURE — 73502 X-RAY EXAM HIP UNI 2-3 VIEWS: CPT | Mod: TC,PO,RT

## 2021-01-28 PROCEDURE — 99999 PR PBB SHADOW E&M-EST. PATIENT-LVL V: ICD-10-PCS | Mod: PBBFAC,,, | Performed by: ORTHOPAEDIC SURGERY

## 2021-01-28 PROCEDURE — 99204 PR OFFICE/OUTPT VISIT, NEW, LEVL IV, 45-59 MIN: ICD-10-PCS | Mod: S$PBB,,, | Performed by: ORTHOPAEDIC SURGERY

## 2021-01-28 PROCEDURE — 99999 PR PBB SHADOW E&M-EST. PATIENT-LVL V: CPT | Mod: PBBFAC,,, | Performed by: ORTHOPAEDIC SURGERY

## 2021-01-28 PROCEDURE — 99215 OFFICE O/P EST HI 40 MIN: CPT | Mod: PBBFAC,25,PN | Performed by: ORTHOPAEDIC SURGERY

## 2021-01-28 PROCEDURE — 99204 OFFICE O/P NEW MOD 45 MIN: CPT | Mod: S$PBB,,, | Performed by: ORTHOPAEDIC SURGERY

## 2021-01-28 PROCEDURE — 73502 X-RAY EXAM HIP UNI 2-3 VIEWS: CPT | Mod: 26,RT,, | Performed by: RADIOLOGY

## 2021-01-28 RX ORDER — SODIUM CHLORIDE 0.9 % (FLUSH) 0.9 %
3 SYRINGE (ML) INJECTION EVERY 6 HOURS PRN
Status: DISCONTINUED | OUTPATIENT
Start: 2021-01-28 | End: 2021-02-09 | Stop reason: CLARIF

## 2021-01-28 RX ORDER — FLUTICASONE FUROATE AND VILANTEROL TRIFENATATE 100; 25 UG/1; UG/1
1 POWDER RESPIRATORY (INHALATION) DAILY
COMMUNITY
Start: 2021-01-09 | End: 2022-10-19

## 2021-02-08 ENCOUNTER — CLINICAL SUPPORT (OUTPATIENT)
Dept: UROLOGY | Facility: CLINIC | Age: 76
End: 2021-02-08
Payer: MEDICARE

## 2021-02-08 ENCOUNTER — IMMUNIZATION (OUTPATIENT)
Dept: FAMILY MEDICINE | Facility: CLINIC | Age: 76
End: 2021-02-08
Payer: MEDICARE

## 2021-02-08 DIAGNOSIS — Z23 NEED FOR VACCINATION: Primary | ICD-10-CM

## 2021-02-08 DIAGNOSIS — E29.1 MALE HYPOGONADISM: Primary | ICD-10-CM

## 2021-02-08 PROCEDURE — 99999 PR PBB SHADOW E&M-EST. PATIENT-LVL III: ICD-10-PCS | Mod: PBBFAC,,,

## 2021-02-08 PROCEDURE — 96372 THER/PROPH/DIAG INJ SC/IM: CPT | Mod: PBBFAC,PO

## 2021-02-08 PROCEDURE — 91300 COVID-19, MRNA, LNP-S, PF, 30 MCG/0.3 ML DOSE VACCINE: CPT | Mod: PBBFAC,PO

## 2021-02-08 PROCEDURE — 99999 PR PBB SHADOW E&M-EST. PATIENT-LVL III: CPT | Mod: PBBFAC,,,

## 2021-02-08 RX ADMIN — TESTOSTERONE CYPIONATE 150 MG: 200 INJECTION, SOLUTION INTRAMUSCULAR at 10:02

## 2021-02-24 DIAGNOSIS — M16.11 PRIMARY OSTEOARTHRITIS OF RIGHT HIP: Primary | ICD-10-CM

## 2021-03-01 ENCOUNTER — IMMUNIZATION (OUTPATIENT)
Dept: FAMILY MEDICINE | Facility: CLINIC | Age: 76
End: 2021-03-01
Payer: MEDICARE

## 2021-03-01 DIAGNOSIS — Z23 NEED FOR VACCINATION: Primary | ICD-10-CM

## 2021-03-01 PROCEDURE — 0002A COVID-19, MRNA, LNP-S, PF, 30 MCG/0.3 ML DOSE VACCINE: CPT | Mod: PBBFAC,PO

## 2021-03-01 PROCEDURE — 91300 COVID-19, MRNA, LNP-S, PF, 30 MCG/0.3 ML DOSE VACCINE: CPT | Mod: PBBFAC,PO

## 2021-03-02 ENCOUNTER — HOSPITAL ENCOUNTER (OUTPATIENT)
Dept: RADIOLOGY | Facility: HOSPITAL | Age: 76
Discharge: HOME | End: 2021-03-02
Attending: ORTHOPAEDIC SURGERY
Payer: MEDICARE

## 2021-03-02 ENCOUNTER — OFFICE VISIT (OUTPATIENT)
Dept: ORTHOPEDICS | Facility: CLINIC | Age: 76
End: 2021-03-02
Payer: MEDICARE

## 2021-03-02 VITALS — SYSTOLIC BLOOD PRESSURE: 151 MMHG | HEART RATE: 100 BPM | DIASTOLIC BLOOD PRESSURE: 71 MMHG

## 2021-03-02 DIAGNOSIS — Z96.641 STATUS POST TOTAL REPLACEMENT OF RIGHT HIP: Primary | ICD-10-CM

## 2021-03-02 DIAGNOSIS — M16.11 PRIMARY OSTEOARTHRITIS OF RIGHT HIP: ICD-10-CM

## 2021-03-02 PROCEDURE — 99213 OFFICE O/P EST LOW 20 MIN: CPT | Mod: PBBFAC,25,PN | Performed by: ORTHOPAEDIC SURGERY

## 2021-03-02 PROCEDURE — 73502 X-RAY EXAM HIP UNI 2-3 VIEWS: CPT | Mod: TC,PO,RT

## 2021-03-02 PROCEDURE — 99024 PR POST-OP FOLLOW-UP VISIT: ICD-10-PCS | Mod: POP,,, | Performed by: ORTHOPAEDIC SURGERY

## 2021-03-02 PROCEDURE — 99999 PR PBB SHADOW E&M-EST. PATIENT-LVL III: ICD-10-PCS | Mod: PBBFAC,,, | Performed by: ORTHOPAEDIC SURGERY

## 2021-03-02 PROCEDURE — 99999 PR PBB SHADOW E&M-EST. PATIENT-LVL III: CPT | Mod: PBBFAC,,, | Performed by: ORTHOPAEDIC SURGERY

## 2021-03-02 PROCEDURE — 99024 POSTOP FOLLOW-UP VISIT: CPT | Mod: POP,,, | Performed by: ORTHOPAEDIC SURGERY

## 2021-03-02 PROCEDURE — 73502 X-RAY EXAM HIP UNI 2-3 VIEWS: CPT | Mod: 26,RT,, | Performed by: RADIOLOGY

## 2021-03-02 PROCEDURE — 73502 XR ORTHO PELVIS_HIP POST OP RIGHT: ICD-10-PCS | Mod: 26,RT,, | Performed by: RADIOLOGY

## 2021-03-02 RX ORDER — METHOCARBAMOL 750 MG/1
750 TABLET, FILM COATED ORAL 4 TIMES DAILY PRN
Qty: 44 TABLET | Refills: 0 | Status: SHIPPED | OUTPATIENT
Start: 2021-03-02 | End: 2021-04-13 | Stop reason: SDUPTHER

## 2021-03-02 RX ORDER — MELOXICAM 15 MG/1
15 TABLET ORAL DAILY
Qty: 30 TABLET | Refills: 11 | Status: SHIPPED | OUTPATIENT
Start: 2021-03-02 | End: 2022-04-07 | Stop reason: SDUPTHER

## 2021-03-03 ENCOUNTER — PATIENT MESSAGE (OUTPATIENT)
Dept: UROLOGY | Facility: CLINIC | Age: 76
End: 2021-03-03

## 2021-03-04 ENCOUNTER — PATIENT MESSAGE (OUTPATIENT)
Dept: UROLOGY | Facility: CLINIC | Age: 76
End: 2021-03-04

## 2021-03-04 ENCOUNTER — TELEPHONE (OUTPATIENT)
Dept: ORTHOPEDICS | Facility: CLINIC | Age: 76
End: 2021-03-04

## 2021-03-09 ENCOUNTER — CLINICAL SUPPORT (OUTPATIENT)
Dept: UROLOGY | Facility: CLINIC | Age: 76
End: 2021-03-09
Payer: MEDICARE

## 2021-03-09 DIAGNOSIS — E29.1 MALE HYPOGONADISM: Primary | ICD-10-CM

## 2021-03-09 PROCEDURE — 99999 PR PBB SHADOW E&M-EST. PATIENT-LVL I: ICD-10-PCS | Mod: PBBFAC,,,

## 2021-03-09 PROCEDURE — 99999 PR PBB SHADOW E&M-EST. PATIENT-LVL I: CPT | Mod: PBBFAC,,,

## 2021-03-09 PROCEDURE — 96372 THER/PROPH/DIAG INJ SC/IM: CPT | Mod: PBBFAC,PO

## 2021-03-09 RX ORDER — TESTOSTERONE CYPIONATE 200 MG/ML
150 INJECTION, SOLUTION INTRAMUSCULAR
Status: COMPLETED | OUTPATIENT
Start: 2021-03-09 | End: 2021-05-18

## 2021-03-09 RX ADMIN — TESTOSTERONE CYPIONATE 150 MG: 200 INJECTION, SOLUTION INTRAMUSCULAR at 11:03

## 2021-03-10 ENCOUNTER — PATIENT MESSAGE (OUTPATIENT)
Dept: ORTHOPEDICS | Facility: CLINIC | Age: 76
End: 2021-03-10

## 2021-03-23 ENCOUNTER — CLINICAL SUPPORT (OUTPATIENT)
Dept: UROLOGY | Facility: CLINIC | Age: 76
End: 2021-03-23
Payer: MEDICARE

## 2021-03-23 DIAGNOSIS — E29.1 MALE HYPOGONADISM: Primary | ICD-10-CM

## 2021-03-23 PROCEDURE — 99999 PR PBB SHADOW E&M-EST. PATIENT-LVL III: CPT | Mod: PBBFAC,,,

## 2021-03-23 PROCEDURE — 96372 THER/PROPH/DIAG INJ SC/IM: CPT | Mod: PBBFAC,PO

## 2021-03-23 PROCEDURE — 99999 PR PBB SHADOW E&M-EST. PATIENT-LVL III: ICD-10-PCS | Mod: PBBFAC,,,

## 2021-03-23 RX ADMIN — TESTOSTERONE CYPIONATE 150 MG: 200 INJECTION, SOLUTION INTRAMUSCULAR at 11:03

## 2021-04-06 ENCOUNTER — CLINICAL SUPPORT (OUTPATIENT)
Dept: UROLOGY | Facility: CLINIC | Age: 76
End: 2021-04-06
Payer: MEDICARE

## 2021-04-06 DIAGNOSIS — E29.1 MALE HYPOGONADISM: Primary | ICD-10-CM

## 2021-04-06 PROCEDURE — 99999 PR PBB SHADOW E&M-EST. PATIENT-LVL III: ICD-10-PCS | Mod: PBBFAC,,,

## 2021-04-06 PROCEDURE — 96372 THER/PROPH/DIAG INJ SC/IM: CPT | Mod: PBBFAC,PO

## 2021-04-06 PROCEDURE — 99999 PR PBB SHADOW E&M-EST. PATIENT-LVL III: CPT | Mod: PBBFAC,,,

## 2021-04-06 RX ADMIN — TESTOSTERONE CYPIONATE 150 MG: 200 INJECTION, SOLUTION INTRAMUSCULAR at 09:04

## 2021-04-09 DIAGNOSIS — Z96.641 STATUS POST TOTAL REPLACEMENT OF RIGHT HIP: Primary | ICD-10-CM

## 2021-04-13 ENCOUNTER — OFFICE VISIT (OUTPATIENT)
Dept: ORTHOPEDICS | Facility: CLINIC | Age: 76
End: 2021-04-13
Payer: MEDICARE

## 2021-04-13 ENCOUNTER — HOSPITAL ENCOUNTER (OUTPATIENT)
Dept: RADIOLOGY | Facility: HOSPITAL | Age: 76
Discharge: HOME | End: 2021-04-13
Attending: ORTHOPAEDIC SURGERY
Payer: MEDICARE

## 2021-04-13 VITALS
HEART RATE: 86 BPM | SYSTOLIC BLOOD PRESSURE: 152 MMHG | WEIGHT: 162 LBS | BODY MASS INDEX: 26.03 KG/M2 | HEIGHT: 66 IN | DIASTOLIC BLOOD PRESSURE: 68 MMHG

## 2021-04-13 DIAGNOSIS — Z96.641 STATUS POST TOTAL REPLACEMENT OF RIGHT HIP: ICD-10-CM

## 2021-04-13 DIAGNOSIS — Z96.641 STATUS POST TOTAL REPLACEMENT OF RIGHT HIP: Primary | ICD-10-CM

## 2021-04-13 PROCEDURE — 99024 PR POST-OP FOLLOW-UP VISIT: ICD-10-PCS | Mod: POP,,, | Performed by: ORTHOPAEDIC SURGERY

## 2021-04-13 PROCEDURE — 99999 PR PBB SHADOW E&M-EST. PATIENT-LVL IV: ICD-10-PCS | Mod: PBBFAC,,, | Performed by: ORTHOPAEDIC SURGERY

## 2021-04-13 PROCEDURE — 99999 PR PBB SHADOW E&M-EST. PATIENT-LVL IV: CPT | Mod: PBBFAC,,, | Performed by: ORTHOPAEDIC SURGERY

## 2021-04-13 PROCEDURE — 73502 XR ORTHO PELVIS_HIP POST OP RIGHT: ICD-10-PCS | Mod: 26,RT,, | Performed by: RADIOLOGY

## 2021-04-13 PROCEDURE — 73502 X-RAY EXAM HIP UNI 2-3 VIEWS: CPT | Mod: 26,RT,, | Performed by: RADIOLOGY

## 2021-04-13 PROCEDURE — 73502 X-RAY EXAM HIP UNI 2-3 VIEWS: CPT | Mod: TC,PO,RT

## 2021-04-13 PROCEDURE — 99214 OFFICE O/P EST MOD 30 MIN: CPT | Mod: PBBFAC,25,PN | Performed by: ORTHOPAEDIC SURGERY

## 2021-04-13 PROCEDURE — 99024 POSTOP FOLLOW-UP VISIT: CPT | Mod: POP,,, | Performed by: ORTHOPAEDIC SURGERY

## 2021-04-13 RX ORDER — METHOCARBAMOL 750 MG/1
750 TABLET, FILM COATED ORAL 4 TIMES DAILY PRN
Qty: 44 TABLET | Refills: 3 | Status: SHIPPED | OUTPATIENT
Start: 2021-04-13 | End: 2021-08-16 | Stop reason: SDUPTHER

## 2021-04-16 ENCOUNTER — PATIENT MESSAGE (OUTPATIENT)
Dept: ORTHOPEDICS | Facility: CLINIC | Age: 76
End: 2021-04-16

## 2021-04-19 ENCOUNTER — PATIENT MESSAGE (OUTPATIENT)
Dept: UROLOGY | Facility: CLINIC | Age: 76
End: 2021-04-19

## 2021-04-28 ENCOUNTER — CLINICAL SUPPORT (OUTPATIENT)
Dept: UROLOGY | Facility: CLINIC | Age: 76
End: 2021-04-28
Payer: MEDICARE

## 2021-04-28 DIAGNOSIS — E29.1 MALE HYPOGONADISM: Primary | ICD-10-CM

## 2021-04-28 PROCEDURE — 99999 PR PBB SHADOW E&M-EST. PATIENT-LVL III: CPT | Mod: PBBFAC,,,

## 2021-04-28 PROCEDURE — 96372 THER/PROPH/DIAG INJ SC/IM: CPT | Mod: PBBFAC,PO

## 2021-04-28 PROCEDURE — 99999 PR PBB SHADOW E&M-EST. PATIENT-LVL III: ICD-10-PCS | Mod: PBBFAC,,,

## 2021-04-28 RX ADMIN — TESTOSTERONE CYPIONATE 150 MG: 200 INJECTION, SOLUTION INTRAMUSCULAR at 10:04

## 2021-05-11 ENCOUNTER — PATIENT MESSAGE (OUTPATIENT)
Dept: UROLOGY | Facility: CLINIC | Age: 76
End: 2021-05-11

## 2021-05-18 ENCOUNTER — CLINICAL SUPPORT (OUTPATIENT)
Dept: UROLOGY | Facility: CLINIC | Age: 76
End: 2021-05-18
Payer: MEDICARE

## 2021-05-18 DIAGNOSIS — E29.1 MALE HYPOGONADISM: Primary | ICD-10-CM

## 2021-05-18 PROCEDURE — 99999 PR PBB SHADOW E&M-EST. PATIENT-LVL III: ICD-10-PCS | Mod: PBBFAC,,,

## 2021-05-18 PROCEDURE — 96372 THER/PROPH/DIAG INJ SC/IM: CPT | Mod: PBBFAC,PO

## 2021-05-18 PROCEDURE — 99999 PR PBB SHADOW E&M-EST. PATIENT-LVL III: CPT | Mod: PBBFAC,,,

## 2021-05-18 RX ADMIN — TESTOSTERONE CYPIONATE 150 MG: 200 INJECTION, SOLUTION INTRAMUSCULAR at 10:05

## 2021-05-31 ENCOUNTER — PATIENT MESSAGE (OUTPATIENT)
Dept: ORTHOPEDICS | Facility: CLINIC | Age: 76
End: 2021-05-31

## 2021-06-01 ENCOUNTER — CLINICAL SUPPORT (OUTPATIENT)
Dept: UROLOGY | Facility: CLINIC | Age: 76
End: 2021-06-01
Payer: MEDICARE

## 2021-06-01 DIAGNOSIS — E29.1 MALE HYPOGONADISM: Primary | ICD-10-CM

## 2021-06-01 PROCEDURE — 99999 PR PBB SHADOW E&M-EST. PATIENT-LVL III: CPT | Mod: PBBFAC,,,

## 2021-06-01 PROCEDURE — 99999 PR PBB SHADOW E&M-EST. PATIENT-LVL III: ICD-10-PCS | Mod: PBBFAC,,,

## 2021-06-01 PROCEDURE — 96372 THER/PROPH/DIAG INJ SC/IM: CPT | Mod: PBBFAC,PO

## 2021-06-01 RX ORDER — CLINDAMYCIN HYDROCHLORIDE 300 MG/1
300 CAPSULE ORAL
Qty: 2 CAPSULE | Refills: 2 | Status: SHIPPED | OUTPATIENT
Start: 2021-06-01 | End: 2022-01-19 | Stop reason: SDUPTHER

## 2021-06-01 RX ORDER — TESTOSTERONE CYPIONATE 200 MG/ML
150 INJECTION, SOLUTION INTRAMUSCULAR
Status: COMPLETED | OUTPATIENT
Start: 2021-06-01 | End: 2021-08-10

## 2021-06-01 RX ADMIN — TESTOSTERONE CYPIONATE 150 MG: 200 INJECTION, SOLUTION INTRAMUSCULAR at 10:06

## 2021-06-02 ENCOUNTER — PATIENT MESSAGE (OUTPATIENT)
Dept: ORTHOPEDICS | Facility: CLINIC | Age: 76
End: 2021-06-02

## 2021-06-15 ENCOUNTER — CLINICAL SUPPORT (OUTPATIENT)
Dept: UROLOGY | Facility: CLINIC | Age: 76
End: 2021-06-15
Payer: MEDICARE

## 2021-06-15 DIAGNOSIS — E29.1 MALE HYPOGONADISM: Primary | ICD-10-CM

## 2021-06-15 PROCEDURE — 99999 PR PBB SHADOW E&M-EST. PATIENT-LVL III: CPT | Mod: PBBFAC,,,

## 2021-06-15 PROCEDURE — 96372 THER/PROPH/DIAG INJ SC/IM: CPT | Mod: PBBFAC,PO

## 2021-06-15 PROCEDURE — 99999 PR PBB SHADOW E&M-EST. PATIENT-LVL III: ICD-10-PCS | Mod: PBBFAC,,,

## 2021-06-15 RX ADMIN — TESTOSTERONE CYPIONATE 150 MG: 200 INJECTION, SOLUTION INTRAMUSCULAR at 09:06

## 2021-06-29 ENCOUNTER — CLINICAL SUPPORT (OUTPATIENT)
Dept: UROLOGY | Facility: CLINIC | Age: 76
End: 2021-06-29
Payer: MEDICARE

## 2021-06-29 DIAGNOSIS — E29.1 MALE HYPOGONADISM: Primary | ICD-10-CM

## 2021-06-29 PROCEDURE — 99999 PR PBB SHADOW E&M-EST. PATIENT-LVL III: CPT | Mod: PBBFAC,,,

## 2021-06-29 PROCEDURE — 99999 PR PBB SHADOW E&M-EST. PATIENT-LVL III: ICD-10-PCS | Mod: PBBFAC,,,

## 2021-06-29 PROCEDURE — 96372 THER/PROPH/DIAG INJ SC/IM: CPT | Mod: PBBFAC,PO

## 2021-06-29 RX ADMIN — TESTOSTERONE CYPIONATE 150 MG: 200 INJECTION, SOLUTION INTRAMUSCULAR at 10:06

## 2021-07-13 ENCOUNTER — CLINICAL SUPPORT (OUTPATIENT)
Dept: UROLOGY | Facility: CLINIC | Age: 76
End: 2021-07-13
Payer: MEDICARE

## 2021-07-13 DIAGNOSIS — E29.1 MALE HYPOGONADISM: Primary | ICD-10-CM

## 2021-07-13 PROCEDURE — 99999 PR PBB SHADOW E&M-EST. PATIENT-LVL III: CPT | Mod: PBBFAC,,,

## 2021-07-13 PROCEDURE — 99999 PR PBB SHADOW E&M-EST. PATIENT-LVL III: ICD-10-PCS | Mod: PBBFAC,,,

## 2021-07-13 PROCEDURE — 96372 THER/PROPH/DIAG INJ SC/IM: CPT | Mod: PBBFAC,PO

## 2021-07-13 RX ADMIN — TESTOSTERONE CYPIONATE 150 MG: 200 INJECTION, SOLUTION INTRAMUSCULAR at 10:07

## 2021-07-27 ENCOUNTER — CLINICAL SUPPORT (OUTPATIENT)
Dept: UROLOGY | Facility: CLINIC | Age: 76
End: 2021-07-27
Payer: MEDICARE

## 2021-07-27 DIAGNOSIS — E29.1 MALE HYPOGONADISM: Primary | ICD-10-CM

## 2021-07-27 PROCEDURE — 99999 PR PBB SHADOW E&M-EST. PATIENT-LVL III: ICD-10-PCS | Mod: PBBFAC,,,

## 2021-07-27 PROCEDURE — 96372 THER/PROPH/DIAG INJ SC/IM: CPT | Mod: PBBFAC,PO

## 2021-07-27 PROCEDURE — 99999 PR PBB SHADOW E&M-EST. PATIENT-LVL III: CPT | Mod: PBBFAC,,,

## 2021-07-27 RX ADMIN — TESTOSTERONE CYPIONATE 150 MG: 200 INJECTION, SOLUTION INTRAMUSCULAR at 10:07

## 2021-08-10 ENCOUNTER — TELEPHONE (OUTPATIENT)
Dept: UROLOGY | Facility: CLINIC | Age: 76
End: 2021-08-10

## 2021-08-10 ENCOUNTER — CLINICAL SUPPORT (OUTPATIENT)
Dept: UROLOGY | Facility: CLINIC | Age: 76
End: 2021-08-10
Payer: MEDICARE

## 2021-08-10 DIAGNOSIS — E29.1 MALE HYPOGONADISM: Primary | ICD-10-CM

## 2021-08-10 PROCEDURE — 96372 THER/PROPH/DIAG INJ SC/IM: CPT | Mod: PBBFAC,PO

## 2021-08-10 PROCEDURE — 99999 PR PBB SHADOW E&M-EST. PATIENT-LVL III: ICD-10-PCS | Mod: PBBFAC,,,

## 2021-08-10 PROCEDURE — 99999 PR PBB SHADOW E&M-EST. PATIENT-LVL III: CPT | Mod: PBBFAC,,,

## 2021-08-10 RX ADMIN — TESTOSTERONE CYPIONATE 150 MG: 200 INJECTION, SOLUTION INTRAMUSCULAR at 11:08

## 2021-08-14 ENCOUNTER — PATIENT MESSAGE (OUTPATIENT)
Dept: ORTHOPEDICS | Facility: CLINIC | Age: 76
End: 2021-08-14

## 2021-08-16 RX ORDER — METHOCARBAMOL 750 MG/1
750 TABLET, FILM COATED ORAL 4 TIMES DAILY PRN
Qty: 44 TABLET | Refills: 3 | Status: SHIPPED | OUTPATIENT
Start: 2021-08-16 | End: 2022-03-08 | Stop reason: SDUPTHER

## 2021-08-24 ENCOUNTER — CLINICAL SUPPORT (OUTPATIENT)
Dept: UROLOGY | Facility: CLINIC | Age: 76
End: 2021-08-24
Payer: MEDICARE

## 2021-08-24 DIAGNOSIS — E29.1 MALE HYPOGONADISM: Primary | ICD-10-CM

## 2021-08-24 PROCEDURE — 99999 PR PBB SHADOW E&M-EST. PATIENT-LVL III: ICD-10-PCS | Mod: PBBFAC,,,

## 2021-08-24 PROCEDURE — 99999 PR PBB SHADOW E&M-EST. PATIENT-LVL III: CPT | Mod: PBBFAC,,,

## 2021-08-24 PROCEDURE — 96372 THER/PROPH/DIAG INJ SC/IM: CPT | Mod: PBBFAC,PO

## 2021-08-24 RX ORDER — TESTOSTERONE CYPIONATE 200 MG/ML
150 INJECTION, SOLUTION INTRAMUSCULAR
Status: COMPLETED | OUTPATIENT
Start: 2021-08-24 | End: 2021-10-05

## 2021-08-24 RX ADMIN — TESTOSTERONE CYPIONATE 150 MG: 200 INJECTION, SOLUTION INTRAMUSCULAR at 09:08

## 2021-09-07 ENCOUNTER — TELEPHONE (OUTPATIENT)
Dept: UROLOGY | Facility: CLINIC | Age: 76
End: 2021-09-07

## 2021-09-07 ENCOUNTER — CLINICAL SUPPORT (OUTPATIENT)
Dept: UROLOGY | Facility: CLINIC | Age: 76
End: 2021-09-07
Payer: MEDICARE

## 2021-09-07 DIAGNOSIS — E29.1 MALE HYPOGONADISM: Primary | ICD-10-CM

## 2021-09-07 DIAGNOSIS — N40.0 BPH WITHOUT URINARY OBSTRUCTION: ICD-10-CM

## 2021-09-07 DIAGNOSIS — Z12.5 SCREENING FOR PROSTATE CANCER: ICD-10-CM

## 2021-09-07 PROCEDURE — 99999 PR PBB SHADOW E&M-EST. PATIENT-LVL III: CPT | Mod: PBBFAC,,,

## 2021-09-07 PROCEDURE — 96372 THER/PROPH/DIAG INJ SC/IM: CPT | Mod: PBBFAC,PO

## 2021-09-07 PROCEDURE — 99999 PR PBB SHADOW E&M-EST. PATIENT-LVL III: ICD-10-PCS | Mod: PBBFAC,,,

## 2021-09-07 RX ADMIN — TESTOSTERONE CYPIONATE 150 MG: 200 INJECTION, SOLUTION INTRAMUSCULAR at 11:09

## 2021-09-21 ENCOUNTER — CLINICAL SUPPORT (OUTPATIENT)
Dept: UROLOGY | Facility: CLINIC | Age: 76
End: 2021-09-21
Payer: MEDICARE

## 2021-09-21 DIAGNOSIS — E29.1 MALE HYPOGONADISM: Primary | ICD-10-CM

## 2021-09-21 PROCEDURE — 96372 THER/PROPH/DIAG INJ SC/IM: CPT | Mod: PBBFAC,PO

## 2021-09-21 PROCEDURE — 99999 PR PBB SHADOW E&M-EST. PATIENT-LVL III: CPT | Mod: PBBFAC,,,

## 2021-09-21 PROCEDURE — 99999 PR PBB SHADOW E&M-EST. PATIENT-LVL III: ICD-10-PCS | Mod: PBBFAC,,,

## 2021-09-21 RX ADMIN — TESTOSTERONE CYPIONATE 150 MG: 200 INJECTION, SOLUTION INTRAMUSCULAR at 09:09

## 2021-09-25 ENCOUNTER — LAB VISIT (OUTPATIENT)
Dept: LAB | Facility: HOSPITAL | Age: 76
End: 2021-09-25
Attending: UROLOGY
Payer: MEDICARE

## 2021-09-25 DIAGNOSIS — E29.1 MALE HYPOGONADISM: ICD-10-CM

## 2021-09-25 DIAGNOSIS — N40.0 BPH WITHOUT URINARY OBSTRUCTION: ICD-10-CM

## 2021-09-25 LAB
COMPLEXED PSA SERPL-MCNC: 1.4 NG/ML (ref 0–4)
HCT VFR BLD AUTO: 49 % (ref 40–54)
HGB BLD-MCNC: 16.1 G/DL (ref 14–18)
TESTOST SERPL-MCNC: 820 NG/DL (ref 304–1227)

## 2021-09-25 PROCEDURE — 85014 HEMATOCRIT: CPT | Performed by: UROLOGY

## 2021-09-25 PROCEDURE — 84153 ASSAY OF PSA TOTAL: CPT | Performed by: UROLOGY

## 2021-09-25 PROCEDURE — 85018 HEMOGLOBIN: CPT | Performed by: UROLOGY

## 2021-09-25 PROCEDURE — 84403 ASSAY OF TOTAL TESTOSTERONE: CPT | Performed by: UROLOGY

## 2021-09-25 PROCEDURE — 36415 COLL VENOUS BLD VENIPUNCTURE: CPT | Mod: PO | Performed by: UROLOGY

## 2021-09-27 ENCOUNTER — OFFICE VISIT (OUTPATIENT)
Dept: UROLOGY | Facility: CLINIC | Age: 76
End: 2021-09-27
Payer: MEDICARE

## 2021-09-27 VITALS — BODY MASS INDEX: 26.05 KG/M2 | WEIGHT: 162.06 LBS | HEIGHT: 66 IN

## 2021-09-27 DIAGNOSIS — E29.1 MALE HYPOGONADISM: Primary | ICD-10-CM

## 2021-09-27 DIAGNOSIS — N40.0 BPH WITHOUT URINARY OBSTRUCTION: ICD-10-CM

## 2021-09-27 PROCEDURE — 99214 OFFICE O/P EST MOD 30 MIN: CPT | Mod: S$PBB,,, | Performed by: UROLOGY

## 2021-09-27 PROCEDURE — 99214 PR OFFICE/OUTPT VISIT, EST, LEVL IV, 30-39 MIN: ICD-10-PCS | Mod: S$PBB,,, | Performed by: UROLOGY

## 2021-09-27 PROCEDURE — 99999 PR PBB SHADOW E&M-EST. PATIENT-LVL III: CPT | Mod: PBBFAC,,, | Performed by: UROLOGY

## 2021-09-27 PROCEDURE — 99999 PR PBB SHADOW E&M-EST. PATIENT-LVL III: ICD-10-PCS | Mod: PBBFAC,,, | Performed by: UROLOGY

## 2021-09-27 PROCEDURE — 99213 OFFICE O/P EST LOW 20 MIN: CPT | Mod: PBBFAC,PO | Performed by: UROLOGY

## 2021-09-29 ENCOUNTER — IMMUNIZATION (OUTPATIENT)
Dept: FAMILY MEDICINE | Facility: CLINIC | Age: 76
End: 2021-09-29
Payer: MEDICARE

## 2021-09-29 DIAGNOSIS — Z23 NEED FOR VACCINATION: Primary | ICD-10-CM

## 2021-09-29 PROCEDURE — 91300 COVID-19, MRNA, LNP-S, PF, 30 MCG/0.3 ML DOSE VACCINE: CPT | Mod: PBBFAC,PO

## 2021-09-29 PROCEDURE — 0003A COVID-19, MRNA, LNP-S, PF, 30 MCG/0.3 ML DOSE VACCINE: CPT | Mod: PBBFAC,PO

## 2021-10-05 ENCOUNTER — CLINICAL SUPPORT (OUTPATIENT)
Dept: UROLOGY | Facility: CLINIC | Age: 76
End: 2021-10-05
Payer: MEDICARE

## 2021-10-05 PROCEDURE — 99999 PR PBB SHADOW E&M-EST. PATIENT-LVL III: ICD-10-PCS | Mod: PBBFAC,,,

## 2021-10-05 PROCEDURE — 99999 PR PBB SHADOW E&M-EST. PATIENT-LVL III: CPT | Mod: PBBFAC,,,

## 2021-10-05 PROCEDURE — 96372 THER/PROPH/DIAG INJ SC/IM: CPT | Mod: PBBFAC,PO

## 2021-10-05 RX ADMIN — TESTOSTERONE CYPIONATE 150 MG: 200 INJECTION, SOLUTION INTRAMUSCULAR at 10:10

## 2021-10-08 ENCOUNTER — LAB VISIT (OUTPATIENT)
Dept: LAB | Facility: HOSPITAL | Age: 76
End: 2021-10-08
Attending: UROLOGY
Payer: MEDICARE

## 2021-10-08 ENCOUNTER — PATIENT MESSAGE (OUTPATIENT)
Dept: UROLOGY | Facility: CLINIC | Age: 76
End: 2021-10-08

## 2021-10-08 ENCOUNTER — TELEPHONE (OUTPATIENT)
Dept: UROLOGY | Facility: CLINIC | Age: 76
End: 2021-10-08

## 2021-10-08 DIAGNOSIS — N40.0 BPH WITHOUT URINARY OBSTRUCTION: Primary | ICD-10-CM

## 2021-10-08 DIAGNOSIS — N40.0 BPH WITHOUT URINARY OBSTRUCTION: ICD-10-CM

## 2021-10-08 LAB
BACTERIA #/AREA URNS HPF: ABNORMAL /HPF
BILIRUB UR QL STRIP: ABNORMAL
CLARITY UR: CLEAR
COLOR UR: YELLOW
GLUCOSE UR QL STRIP: NEGATIVE
HGB UR QL STRIP: ABNORMAL
HYALINE CASTS #/AREA URNS LPF: 5 /LPF
KETONES UR QL STRIP: ABNORMAL
LEUKOCYTE ESTERASE UR QL STRIP: ABNORMAL
MICROSCOPIC COMMENT: ABNORMAL
NITRITE UR QL STRIP: NEGATIVE
PH UR STRIP: 6 [PH] (ref 5–8)
PROT UR QL STRIP: ABNORMAL
RBC #/AREA URNS HPF: 4 /HPF (ref 0–4)
SP GR UR STRIP: 1.02 (ref 1–1.03)
SQUAMOUS #/AREA URNS HPF: 2 /HPF
URN SPEC COLLECT METH UR: ABNORMAL
WBC #/AREA URNS HPF: 25 /HPF (ref 0–5)
WBC CLUMPS URNS QL MICRO: ABNORMAL

## 2021-10-08 PROCEDURE — 87186 SC STD MICRODIL/AGAR DIL: CPT | Performed by: UROLOGY

## 2021-10-08 PROCEDURE — 87088 URINE BACTERIA CULTURE: CPT | Performed by: UROLOGY

## 2021-10-08 PROCEDURE — 87077 CULTURE AEROBIC IDENTIFY: CPT | Performed by: UROLOGY

## 2021-10-08 PROCEDURE — 87184 SC STD DISK METHOD PER PLATE: CPT | Performed by: UROLOGY

## 2021-10-08 PROCEDURE — 87086 URINE CULTURE/COLONY COUNT: CPT | Performed by: UROLOGY

## 2021-10-08 PROCEDURE — 81000 URINALYSIS NONAUTO W/SCOPE: CPT | Mod: PO | Performed by: UROLOGY

## 2021-10-11 ENCOUNTER — PATIENT MESSAGE (OUTPATIENT)
Dept: UROLOGY | Facility: CLINIC | Age: 76
End: 2021-10-11

## 2021-10-11 RX ORDER — CIPROFLOXACIN 500 MG/1
500 TABLET ORAL 2 TIMES DAILY
Qty: 28 TABLET | Refills: 0 | Status: SHIPPED | OUTPATIENT
Start: 2021-10-11 | End: 2021-10-25

## 2021-10-12 ENCOUNTER — PATIENT MESSAGE (OUTPATIENT)
Dept: UROLOGY | Facility: CLINIC | Age: 76
End: 2021-10-12

## 2021-10-12 LAB — BACTERIA UR CULT: ABNORMAL

## 2021-10-15 ENCOUNTER — PATIENT MESSAGE (OUTPATIENT)
Dept: UROLOGY | Facility: CLINIC | Age: 76
End: 2021-10-15

## 2021-10-18 PROBLEM — J45.909 INTRINSIC ASTHMA WITHOUT STATUS ASTHMATICUS WITHOUT COMPLICATION: Status: ACTIVE | Noted: 2021-10-18

## 2021-10-18 PROBLEM — E29.1 HYPOGONADISM, MALE: Status: ACTIVE | Noted: 2021-10-18

## 2021-10-18 PROBLEM — G47.33 OBSTRUCTIVE SLEEP APNEA (ADULT) (PEDIATRIC): Status: ACTIVE | Noted: 2021-10-18

## 2021-10-18 PROBLEM — G20.A1 PARKINSONS: Status: ACTIVE | Noted: 2021-10-18

## 2021-10-19 ENCOUNTER — CLINICAL SUPPORT (OUTPATIENT)
Dept: UROLOGY | Facility: CLINIC | Age: 76
End: 2021-10-19
Payer: MEDICARE

## 2021-10-19 DIAGNOSIS — E29.1 MALE HYPOGONADISM: Primary | ICD-10-CM

## 2021-10-19 PROCEDURE — 99999 PR PBB SHADOW E&M-EST. PATIENT-LVL III: ICD-10-PCS | Mod: PBBFAC,,,

## 2021-10-19 PROCEDURE — 99999 PR PBB SHADOW E&M-EST. PATIENT-LVL III: CPT | Mod: PBBFAC,,,

## 2021-10-19 PROCEDURE — 96372 THER/PROPH/DIAG INJ SC/IM: CPT | Mod: PBBFAC,PO

## 2021-10-19 RX ORDER — TESTOSTERONE CYPIONATE 200 MG/ML
150 INJECTION, SOLUTION INTRAMUSCULAR
Status: SHIPPED | OUTPATIENT
Start: 2021-10-19 | End: 2022-04-05

## 2021-10-19 RX ADMIN — TESTOSTERONE CYPIONATE 150 MG: 200 INJECTION INTRAMUSCULAR at 10:10

## 2021-11-03 ENCOUNTER — CLINICAL SUPPORT (OUTPATIENT)
Dept: UROLOGY | Facility: CLINIC | Age: 76
End: 2021-11-03
Payer: MEDICARE

## 2021-11-03 DIAGNOSIS — E29.1 MALE HYPOGONADISM: Primary | ICD-10-CM

## 2021-11-03 PROCEDURE — 96372 THER/PROPH/DIAG INJ SC/IM: CPT | Mod: PBBFAC,PO

## 2021-11-03 PROCEDURE — 99999 PR PBB SHADOW E&M-EST. PATIENT-LVL III: CPT | Mod: PBBFAC,,,

## 2021-11-03 PROCEDURE — 99999 PR PBB SHADOW E&M-EST. PATIENT-LVL III: ICD-10-PCS | Mod: PBBFAC,,,

## 2021-11-03 RX ADMIN — TESTOSTERONE CYPIONATE 150 MG: 200 INJECTION INTRAMUSCULAR at 09:11

## 2021-11-06 ENCOUNTER — PATIENT MESSAGE (OUTPATIENT)
Dept: UROLOGY | Facility: CLINIC | Age: 76
End: 2021-11-06
Payer: MEDICARE

## 2021-11-22 ENCOUNTER — PATIENT MESSAGE (OUTPATIENT)
Dept: UROLOGY | Facility: CLINIC | Age: 76
End: 2021-11-22
Payer: MEDICARE

## 2021-12-07 ENCOUNTER — CLINICAL SUPPORT (OUTPATIENT)
Dept: UROLOGY | Facility: CLINIC | Age: 76
End: 2021-12-07
Payer: MEDICARE

## 2021-12-07 DIAGNOSIS — E29.1 MALE HYPOGONADISM: Primary | ICD-10-CM

## 2021-12-07 PROCEDURE — 96372 THER/PROPH/DIAG INJ SC/IM: CPT | Mod: PBBFAC,PO

## 2021-12-07 PROCEDURE — 99999 PR PBB SHADOW E&M-EST. PATIENT-LVL III: CPT | Mod: PBBFAC,,,

## 2021-12-07 PROCEDURE — 99999 PR PBB SHADOW E&M-EST. PATIENT-LVL III: ICD-10-PCS | Mod: PBBFAC,,,

## 2021-12-07 RX ADMIN — TESTOSTERONE CYPIONATE 150 MG: 200 INJECTION INTRAMUSCULAR at 09:12

## 2021-12-21 ENCOUNTER — CLINICAL SUPPORT (OUTPATIENT)
Dept: UROLOGY | Facility: CLINIC | Age: 76
End: 2021-12-21
Payer: MEDICARE

## 2021-12-21 DIAGNOSIS — E29.1 MALE HYPOGONADISM: Primary | ICD-10-CM

## 2021-12-21 PROCEDURE — 99999 PR PBB SHADOW E&M-EST. PATIENT-LVL III: ICD-10-PCS | Mod: PBBFAC,,,

## 2021-12-21 PROCEDURE — 96372 THER/PROPH/DIAG INJ SC/IM: CPT | Mod: PBBFAC,PO

## 2021-12-21 PROCEDURE — 99999 PR PBB SHADOW E&M-EST. PATIENT-LVL III: CPT | Mod: PBBFAC,,,

## 2021-12-21 RX ADMIN — TESTOSTERONE CYPIONATE 150 MG: 200 INJECTION INTRAMUSCULAR at 10:12

## 2022-01-04 ENCOUNTER — CLINICAL SUPPORT (OUTPATIENT)
Dept: UROLOGY | Facility: CLINIC | Age: 77
End: 2022-01-04
Payer: MEDICARE

## 2022-01-04 DIAGNOSIS — E29.1 MALE HYPOGONADISM: Primary | ICD-10-CM

## 2022-01-04 PROCEDURE — 96372 THER/PROPH/DIAG INJ SC/IM: CPT | Mod: PBBFAC,PO

## 2022-01-04 PROCEDURE — 99999 PR PBB SHADOW E&M-EST. PATIENT-LVL III: CPT | Mod: PBBFAC,,,

## 2022-01-04 PROCEDURE — 99999 PR PBB SHADOW E&M-EST. PATIENT-LVL III: ICD-10-PCS | Mod: PBBFAC,,,

## 2022-01-04 PROCEDURE — 99213 OFFICE O/P EST LOW 20 MIN: CPT | Mod: PBBFAC,PO

## 2022-01-04 RX ADMIN — TESTOSTERONE CYPIONATE 150 MG: 200 INJECTION INTRAMUSCULAR at 10:01

## 2022-01-04 NOTE — PROGRESS NOTES
Testosterone 150 mg injection given, see MAR. Pt tolerated well, recommended to wait twenty minutes after injection to observe for adverse reaction. Pt declined to wait.

## 2022-01-18 ENCOUNTER — CLINICAL SUPPORT (OUTPATIENT)
Dept: UROLOGY | Facility: CLINIC | Age: 77
End: 2022-01-18
Payer: MEDICARE

## 2022-01-18 DIAGNOSIS — E29.1 MALE HYPOGONADISM: Primary | ICD-10-CM

## 2022-01-18 PROCEDURE — 96372 THER/PROPH/DIAG INJ SC/IM: CPT | Mod: PBBFAC,PO

## 2022-01-18 PROCEDURE — 99999 PR PBB SHADOW E&M-EST. PATIENT-LVL II: CPT | Mod: PBBFAC,,,

## 2022-01-18 PROCEDURE — 99999 PR PBB SHADOW E&M-EST. PATIENT-LVL II: ICD-10-PCS | Mod: PBBFAC,,,

## 2022-01-18 PROCEDURE — 99212 OFFICE O/P EST SF 10 MIN: CPT | Mod: PBBFAC,PO

## 2022-01-18 RX ADMIN — TESTOSTERONE CYPIONATE 150 MG: 200 INJECTION INTRAMUSCULAR at 10:01

## 2022-01-19 ENCOUNTER — PATIENT MESSAGE (OUTPATIENT)
Dept: ORTHOPEDICS | Facility: CLINIC | Age: 77
End: 2022-01-19
Payer: MEDICARE

## 2022-01-19 RX ORDER — CLINDAMYCIN HYDROCHLORIDE 300 MG/1
300 CAPSULE ORAL
Qty: 5 CAPSULE | Refills: 5 | Status: SHIPPED | OUTPATIENT
Start: 2022-01-19

## 2022-02-01 ENCOUNTER — CLINICAL SUPPORT (OUTPATIENT)
Dept: UROLOGY | Facility: CLINIC | Age: 77
End: 2022-02-01
Payer: MEDICARE

## 2022-02-01 DIAGNOSIS — E29.1 MALE HYPOGONADISM: Primary | ICD-10-CM

## 2022-02-01 PROCEDURE — 99999 PR PBB SHADOW E&M-EST. PATIENT-LVL III: ICD-10-PCS | Mod: PBBFAC,,,

## 2022-02-01 PROCEDURE — 99999 PR PBB SHADOW E&M-EST. PATIENT-LVL III: CPT | Mod: PBBFAC,,,

## 2022-02-01 PROCEDURE — 96372 THER/PROPH/DIAG INJ SC/IM: CPT | Mod: PBBFAC,PO

## 2022-02-01 PROCEDURE — 99213 OFFICE O/P EST LOW 20 MIN: CPT | Mod: PBBFAC,PO

## 2022-02-01 RX ADMIN — TESTOSTERONE CYPIONATE 150 MG: 200 INJECTION INTRAMUSCULAR at 10:02

## 2022-02-15 ENCOUNTER — CLINICAL SUPPORT (OUTPATIENT)
Dept: UROLOGY | Facility: CLINIC | Age: 77
End: 2022-02-15
Payer: MEDICARE

## 2022-02-15 DIAGNOSIS — E29.1 MALE HYPOGONADISM: Primary | ICD-10-CM

## 2022-02-15 PROCEDURE — 99212 OFFICE O/P EST SF 10 MIN: CPT | Mod: PBBFAC,PO

## 2022-02-15 PROCEDURE — 99999 PR PBB SHADOW E&M-EST. PATIENT-LVL II: CPT | Mod: PBBFAC,,,

## 2022-02-15 PROCEDURE — 99999 PR PBB SHADOW E&M-EST. PATIENT-LVL II: ICD-10-PCS | Mod: PBBFAC,,,

## 2022-02-15 PROCEDURE — 96372 THER/PROPH/DIAG INJ SC/IM: CPT | Mod: PBBFAC,PO

## 2022-02-15 RX ADMIN — TESTOSTERONE CYPIONATE 150 MG: 200 INJECTION INTRAMUSCULAR at 11:02

## 2022-03-04 ENCOUNTER — CLINICAL SUPPORT (OUTPATIENT)
Dept: UROLOGY | Facility: CLINIC | Age: 77
End: 2022-03-04
Payer: MEDICARE

## 2022-03-04 DIAGNOSIS — E29.1 MALE HYPOGONADISM: Primary | ICD-10-CM

## 2022-03-04 PROCEDURE — 96372 THER/PROPH/DIAG INJ SC/IM: CPT | Mod: PBBFAC,PO

## 2022-03-04 PROCEDURE — 99999 PR PBB SHADOW E&M-EST. PATIENT-LVL II: ICD-10-PCS | Mod: PBBFAC,,,

## 2022-03-04 PROCEDURE — 99212 OFFICE O/P EST SF 10 MIN: CPT | Mod: PBBFAC,PO

## 2022-03-04 PROCEDURE — 99999 PR PBB SHADOW E&M-EST. PATIENT-LVL II: CPT | Mod: PBBFAC,,,

## 2022-03-04 RX ADMIN — TESTOSTERONE CYPIONATE 150 MG: 200 INJECTION INTRAMUSCULAR at 10:03

## 2022-03-08 ENCOUNTER — PATIENT MESSAGE (OUTPATIENT)
Dept: ORTHOPEDICS | Facility: CLINIC | Age: 77
End: 2022-03-08
Payer: MEDICARE

## 2022-03-08 DIAGNOSIS — Z96.641 STATUS POST TOTAL REPLACEMENT OF RIGHT HIP: Primary | ICD-10-CM

## 2022-03-08 RX ORDER — METHOCARBAMOL 750 MG/1
750 TABLET, FILM COATED ORAL 4 TIMES DAILY PRN
Qty: 44 TABLET | Refills: 3 | Status: SHIPPED | OUTPATIENT
Start: 2022-03-08 | End: 2023-10-24

## 2022-03-22 ENCOUNTER — CLINICAL SUPPORT (OUTPATIENT)
Dept: UROLOGY | Facility: CLINIC | Age: 77
End: 2022-03-22
Payer: MEDICARE

## 2022-03-22 DIAGNOSIS — E29.1 MALE HYPOGONADISM: Primary | ICD-10-CM

## 2022-03-22 PROCEDURE — 99999 PR PBB SHADOW E&M-EST. PATIENT-LVL III: ICD-10-PCS | Mod: PBBFAC,,,

## 2022-03-22 PROCEDURE — 96372 THER/PROPH/DIAG INJ SC/IM: CPT | Mod: PBBFAC,PO

## 2022-03-22 PROCEDURE — 99213 OFFICE O/P EST LOW 20 MIN: CPT | Mod: PBBFAC,PO

## 2022-03-22 PROCEDURE — 99999 PR PBB SHADOW E&M-EST. PATIENT-LVL III: CPT | Mod: PBBFAC,,,

## 2022-03-22 RX ADMIN — TESTOSTERONE CYPIONATE 150 MG: 200 INJECTION INTRAMUSCULAR at 10:03

## 2022-03-31 DIAGNOSIS — Z96.641 STATUS POST TOTAL REPLACEMENT OF RIGHT HIP: Primary | ICD-10-CM

## 2022-04-05 ENCOUNTER — CLINICAL SUPPORT (OUTPATIENT)
Dept: UROLOGY | Facility: CLINIC | Age: 77
End: 2022-04-05
Payer: MEDICARE

## 2022-04-05 DIAGNOSIS — E29.1 MALE HYPOGONADISM: Primary | ICD-10-CM

## 2022-04-05 PROCEDURE — 99999 PR PBB SHADOW E&M-EST. PATIENT-LVL III: CPT | Mod: PBBFAC,,,

## 2022-04-05 PROCEDURE — 99213 OFFICE O/P EST LOW 20 MIN: CPT | Mod: PBBFAC,PO

## 2022-04-05 PROCEDURE — 96372 THER/PROPH/DIAG INJ SC/IM: CPT | Mod: PBBFAC,PO

## 2022-04-05 PROCEDURE — 99999 PR PBB SHADOW E&M-EST. PATIENT-LVL III: ICD-10-PCS | Mod: PBBFAC,,,

## 2022-04-05 RX ADMIN — TESTOSTERONE CYPIONATE 150 MG: 200 INJECTION INTRAMUSCULAR at 10:04

## 2022-04-07 ENCOUNTER — OFFICE VISIT (OUTPATIENT)
Dept: ORTHOPEDICS | Facility: CLINIC | Age: 77
End: 2022-04-07
Payer: MEDICARE

## 2022-04-07 ENCOUNTER — HOSPITAL ENCOUNTER (OUTPATIENT)
Dept: RADIOLOGY | Facility: HOSPITAL | Age: 77
Discharge: HOME | End: 2022-04-07
Attending: ORTHOPAEDIC SURGERY
Payer: MEDICARE

## 2022-04-07 VITALS — WEIGHT: 162 LBS | HEIGHT: 66 IN | BODY MASS INDEX: 26.03 KG/M2

## 2022-04-07 DIAGNOSIS — Z96.641 STATUS POST TOTAL REPLACEMENT OF RIGHT HIP: ICD-10-CM

## 2022-04-07 DIAGNOSIS — Z96.641 STATUS POST TOTAL REPLACEMENT OF RIGHT HIP: Primary | ICD-10-CM

## 2022-04-07 PROCEDURE — 99999 PR PBB SHADOW E&M-EST. PATIENT-LVL II: CPT | Mod: PBBFAC,,, | Performed by: ORTHOPAEDIC SURGERY

## 2022-04-07 PROCEDURE — 99214 PR OFFICE/OUTPT VISIT, EST, LEVL IV, 30-39 MIN: ICD-10-PCS | Mod: S$PBB,,, | Performed by: ORTHOPAEDIC SURGERY

## 2022-04-07 PROCEDURE — 73502 XR ORTHO PELVIS_HIP POST OP RIGHT: ICD-10-PCS | Mod: 26,RT,, | Performed by: RADIOLOGY

## 2022-04-07 PROCEDURE — 99214 OFFICE O/P EST MOD 30 MIN: CPT | Mod: S$PBB,,, | Performed by: ORTHOPAEDIC SURGERY

## 2022-04-07 PROCEDURE — 99999 PR PBB SHADOW E&M-EST. PATIENT-LVL II: ICD-10-PCS | Mod: PBBFAC,,, | Performed by: ORTHOPAEDIC SURGERY

## 2022-04-07 PROCEDURE — 99212 OFFICE O/P EST SF 10 MIN: CPT | Mod: PBBFAC,PN | Performed by: ORTHOPAEDIC SURGERY

## 2022-04-07 PROCEDURE — 73502 X-RAY EXAM HIP UNI 2-3 VIEWS: CPT | Mod: TC,PO,RT

## 2022-04-07 PROCEDURE — 73502 X-RAY EXAM HIP UNI 2-3 VIEWS: CPT | Mod: 26,RT,, | Performed by: RADIOLOGY

## 2022-04-07 RX ORDER — MELOXICAM 15 MG/1
15 TABLET ORAL DAILY
Qty: 30 TABLET | Refills: 11 | Status: SHIPPED | OUTPATIENT
Start: 2022-04-07 | End: 2023-04-28

## 2022-04-07 NOTE — PROGRESS NOTES
No chief complaint on file.      HPI:   This is a 76 y.o. who returns today status post right CARI 1 years ago. Patient has been FWB.  Pain is dull. No numbness or tingling. No associated signs or symptoms.    Past Medical History:   Diagnosis Date    Allergy     Arthritis     Hypertension     Sleep apnea     USES C-PAP     Past Surgical History:   Procedure Laterality Date    ARTHROPLASTY OF HIP BY ANTERIOR APPROACH Right 2/15/2021    Procedure: ARTHROPLASTY, HIP, ANTERIOR APPROACH;  Surgeon: Maynor Tolentino MD;  [facility];  Service: Orthopedics;  Laterality: Right;    CIRCUMCISION      EYE SURGERY Left     age 6    HERNIA REPAIR Left     TONSILLECTOMY       Current Outpatient Medications on File Prior to Visit   Medication Sig Dispense Refill    acetaminophen (TYLENOL) 500 MG tablet Take 2 tablets (1,000 mg total) by mouth every 8 (eight) hours. (Patient taking differently: Take 2 tablets by mouth every 8 (eight) hours. for pain reported at 6 or below on 0 to 10 scale) 90 tablet 0    albuterol (VENTOLIN HFA) 90 mcg/actuation inhaler Inhale 2 puffs into the lungs every 6 (six) hours as needed for Wheezing. Rescue 8 g 2    BREO ELLIPTA 100-25 mcg/dose diskus inhaler Inhale 1 puff into the lungs once daily.       carbidopa-levodopa  mg (SINEMET)  mg per tablet Take 1 tablet by mouth 2 (two) times a day.       clindamycin (CLEOCIN) 300 MG capsule Take 1 capsule (300 mg total) by mouth On call Procedure (dental work). 5 capsule 5    hydroCHLOROthiazide (HYDRODIURIL) 25 MG tablet Take 1 tablet (25 mg total) by mouth once daily. 90 tablet 3    irbesartan (AVAPRO) 150 MG tablet Take 1 tablet (150 mg total) by mouth once daily. 90 tablet 3    loratadine (CLARITIN) 10 mg tablet Take 10 mg by mouth once daily.      methocarbamoL (ROBAXIN) 750 MG Tab Take 1 tablet (750 mg total) by mouth 4 (four) times daily as needed (spasm). 44 tablet 3    multivitamin (THERAGRAN) per tablet Take 1  tablet by mouth once daily.      oxyCODONE (ROXICODONE) 5 MG immediate release tablet Take 1 tablet (5 mg total) by mouth every 4 (four) hours as needed for Pain. (Patient taking differently: Take 1 tablet by mouth every 4 (four) hours as needed for Pain. for pain greater than 6 on 0 to 10 scale) 22 tablet 0    pantoprazole (PROTONIX) 40 MG tablet Take 1 tablet (40 mg total) by mouth every morning. 90 tablet 0    [DISCONTINUED] meloxicam (MOBIC) 15 MG tablet Take 1 tablet (15 mg total) by mouth once daily. 30 tablet 11     No current facility-administered medications on file prior to visit.     Review of patient's allergies indicates:   Allergen Reactions    Penicillins      As a child     Family History   Problem Relation Age of Onset    Kidney disease Mother     Stroke Father     Hypertension Father      Social History     Socioeconomic History    Marital status:    Tobacco Use    Smoking status: Former Smoker     Types: Cigarettes     Quit date: 3/20/1985     Years since quittin.0    Smokeless tobacco: Never Used   Substance and Sexual Activity    Alcohol use: Yes     Alcohol/week: 14.0 standard drinks     Types: 14 Glasses of wine per week    Drug use: No    Sexual activity: Yes     Partners: Female       Review of Systems:  Constitutional:  Denies fever or chills   Eyes:  Denies change in visual acuity   HENT:  Denies nasal congestion or sore throat   Respiratory:  Denies cough or shortness of breath   Cardiovascular:  Denies chest pain or edema   GI:  Denies abdominal pain, nausea, vomiting, bloody stools or diarrhea   :  Denies dysuria   Integument:  Denies rash   Neurologic:  Denies headache, focal weakness or sensory changes   Endocrine:  Denies polyuria or polydipsia   Lymphatic:  Denies swollen glands   Psychiatric:  Denies depression or anxiety     Physical Exam:   Constitutional:  Well developed, well nourished, no acute distress, non-toxic appearance   Integument:  Well  hydrated, no rash   Lymphatic:  No lymphadenopathy noted   Neurologic:  Alert & oriented x 3, CN 2-12 normal, normal motor function, normal sensory function, no focal deficits noted   Psychiatric:  Speech and behavior appropriate   Gi: abdomen soft  Eyes: EOMI    B hips  FROM with no pain. Incision to right hip healed. Skin intact. Compartments soft. NVI distally.     X-rays were performed today, personally reviewed by me and findings discussed with the patient.  2 views of the right hip show implants intact in good position      Status post total replacement of right hip    Other orders  -     meloxicam (MOBIC) 15 MG tablet; Take 1 tablet (15 mg total) by mouth once daily.  Dispense: 30 tablet; Refill: 11        Continue as tolerated. RTC as needed.

## 2022-04-18 ENCOUNTER — PATIENT MESSAGE (OUTPATIENT)
Dept: UROLOGY | Facility: CLINIC | Age: 77
End: 2022-04-18
Payer: MEDICARE

## 2022-04-19 ENCOUNTER — CLINICAL SUPPORT (OUTPATIENT)
Dept: UROLOGY | Facility: CLINIC | Age: 77
End: 2022-04-19
Payer: MEDICARE

## 2022-04-19 DIAGNOSIS — E29.1 MALE HYPOGONADISM: Primary | ICD-10-CM

## 2022-04-19 PROCEDURE — 99999 PR PBB SHADOW E&M-EST. PATIENT-LVL III: ICD-10-PCS | Mod: PBBFAC,,,

## 2022-04-19 PROCEDURE — 99999 PR PBB SHADOW E&M-EST. PATIENT-LVL III: CPT | Mod: PBBFAC,,,

## 2022-04-19 PROCEDURE — 99213 OFFICE O/P EST LOW 20 MIN: CPT | Mod: PBBFAC,PO

## 2022-04-19 PROCEDURE — 96372 THER/PROPH/DIAG INJ SC/IM: CPT | Mod: PBBFAC,PO

## 2022-04-19 RX ORDER — TESTOSTERONE CYPIONATE 200 MG/ML
150 INJECTION, SOLUTION INTRAMUSCULAR
Status: COMPLETED | OUTPATIENT
Start: 2022-04-19 | End: 2022-07-12

## 2022-04-19 RX ADMIN — TESTOSTERONE CYPIONATE 150 MG: 200 INJECTION INTRAMUSCULAR at 03:04

## 2022-04-21 PROBLEM — G20.A1 PARKINSON'S DISEASE: Status: ACTIVE | Noted: 2017-12-21

## 2022-05-03 ENCOUNTER — PATIENT OUTREACH (OUTPATIENT)
Dept: ADMINISTRATIVE | Facility: HOSPITAL | Age: 77
End: 2022-05-03
Payer: MEDICARE

## 2022-05-03 ENCOUNTER — CLINICAL SUPPORT (OUTPATIENT)
Dept: UROLOGY | Facility: CLINIC | Age: 77
End: 2022-05-03
Payer: MEDICARE

## 2022-05-03 DIAGNOSIS — E29.1 MALE HYPOGONADISM: Primary | ICD-10-CM

## 2022-05-03 PROCEDURE — 99999 PR PBB SHADOW E&M-EST. PATIENT-LVL III: CPT | Mod: PBBFAC,,,

## 2022-05-03 PROCEDURE — 99999 PR PBB SHADOW E&M-EST. PATIENT-LVL III: ICD-10-PCS | Mod: PBBFAC,,,

## 2022-05-03 PROCEDURE — 99213 OFFICE O/P EST LOW 20 MIN: CPT | Mod: PBBFAC,PO

## 2022-05-03 NOTE — PROGRESS NOTES
[facility] CMS chart audits. Chart review completed for  test overdue (mammograms, Colonoscopies, pap smears, DM labs, and/or EYE EXAMs)      Care Everywhere and media, updates requested and reviewed.    [facility] and [facility] reviewed.  Pap Smear and  LABS   **        NEEDS:  COLON CANCER SCREENING

## 2022-05-17 ENCOUNTER — CLINICAL SUPPORT (OUTPATIENT)
Dept: UROLOGY | Facility: CLINIC | Age: 77
End: 2022-05-17
Payer: MEDICARE

## 2022-05-17 DIAGNOSIS — E29.1 MALE HYPOGONADISM: Primary | ICD-10-CM

## 2022-05-17 PROCEDURE — 99999 PR PBB SHADOW E&M-EST. PATIENT-LVL II: ICD-10-PCS | Mod: PBBFAC,,,

## 2022-05-17 PROCEDURE — 99999 PR PBB SHADOW E&M-EST. PATIENT-LVL II: CPT | Mod: PBBFAC,,,

## 2022-05-17 PROCEDURE — 96372 THER/PROPH/DIAG INJ SC/IM: CPT | Mod: PBBFAC,PO

## 2022-05-17 PROCEDURE — 99212 OFFICE O/P EST SF 10 MIN: CPT | Mod: PBBFAC,PO

## 2022-05-17 RX ADMIN — TESTOSTERONE CYPIONATE 150 MG: 200 INJECTION INTRAMUSCULAR at 10:05

## 2022-05-31 ENCOUNTER — CLINICAL SUPPORT (OUTPATIENT)
Dept: UROLOGY | Facility: CLINIC | Age: 77
End: 2022-05-31
Payer: MEDICARE

## 2022-05-31 DIAGNOSIS — E29.1 MALE HYPOGONADISM: Primary | ICD-10-CM

## 2022-05-31 PROCEDURE — 99212 OFFICE O/P EST SF 10 MIN: CPT | Mod: PBBFAC,PO

## 2022-05-31 PROCEDURE — 96372 THER/PROPH/DIAG INJ SC/IM: CPT | Mod: PBBFAC,PO

## 2022-05-31 PROCEDURE — 99999 PR PBB SHADOW E&M-EST. PATIENT-LVL II: ICD-10-PCS | Mod: PBBFAC,,,

## 2022-05-31 PROCEDURE — 99999 PR PBB SHADOW E&M-EST. PATIENT-LVL II: CPT | Mod: PBBFAC,,,

## 2022-05-31 RX ADMIN — TESTOSTERONE CYPIONATE 150 MG: 200 INJECTION INTRAMUSCULAR at 10:05

## 2022-06-14 ENCOUNTER — CLINICAL SUPPORT (OUTPATIENT)
Dept: UROLOGY | Facility: CLINIC | Age: 77
End: 2022-06-14
Payer: MEDICARE

## 2022-06-14 DIAGNOSIS — E29.1 MALE HYPOGONADISM: Primary | ICD-10-CM

## 2022-06-14 PROCEDURE — 96372 THER/PROPH/DIAG INJ SC/IM: CPT | Mod: PBBFAC,PO

## 2022-06-14 PROCEDURE — 99213 OFFICE O/P EST LOW 20 MIN: CPT | Mod: PBBFAC,PO

## 2022-06-14 PROCEDURE — 99999 PR PBB SHADOW E&M-EST. PATIENT-LVL III: CPT | Mod: PBBFAC,,,

## 2022-06-14 PROCEDURE — 99999 PR PBB SHADOW E&M-EST. PATIENT-LVL III: ICD-10-PCS | Mod: PBBFAC,,,

## 2022-06-14 RX ADMIN — TESTOSTERONE CYPIONATE 150 MG: 200 INJECTION INTRAMUSCULAR at 10:06

## 2022-06-22 ENCOUNTER — IMMUNIZATION (OUTPATIENT)
Dept: FAMILY MEDICINE | Facility: CLINIC | Age: 77
End: 2022-06-22
Payer: MEDICARE

## 2022-06-22 DIAGNOSIS — Z23 NEED FOR VACCINATION: Primary | ICD-10-CM

## 2022-06-22 PROCEDURE — 91305 COVID-19, MRNA, LNP-S, PF, 30 MCG/0.3 ML DOSE VACCINE (PFIZER): CPT | Mod: PBBFAC,PO

## 2022-06-28 ENCOUNTER — CLINICAL SUPPORT (OUTPATIENT)
Dept: UROLOGY | Facility: CLINIC | Age: 77
End: 2022-06-28
Payer: MEDICARE

## 2022-06-28 DIAGNOSIS — E29.1 MALE HYPOGONADISM: Primary | ICD-10-CM

## 2022-06-28 PROCEDURE — 96372 THER/PROPH/DIAG INJ SC/IM: CPT | Mod: PBBFAC,PO

## 2022-06-28 PROCEDURE — 99213 OFFICE O/P EST LOW 20 MIN: CPT | Mod: PBBFAC,PO

## 2022-06-28 PROCEDURE — 99999 PR PBB SHADOW E&M-EST. PATIENT-LVL III: CPT | Mod: PBBFAC,,,

## 2022-06-28 PROCEDURE — 99999 PR PBB SHADOW E&M-EST. PATIENT-LVL III: ICD-10-PCS | Mod: PBBFAC,,,

## 2022-06-28 RX ADMIN — TESTOSTERONE CYPIONATE 150 MG: 200 INJECTION INTRAMUSCULAR at 10:06

## 2022-07-12 ENCOUNTER — CLINICAL SUPPORT (OUTPATIENT)
Dept: UROLOGY | Facility: CLINIC | Age: 77
End: 2022-07-12
Payer: MEDICARE

## 2022-07-12 ENCOUNTER — TELEPHONE (OUTPATIENT)
Dept: UROLOGY | Facility: CLINIC | Age: 77
End: 2022-07-12
Payer: MEDICARE

## 2022-07-12 ENCOUNTER — PATIENT MESSAGE (OUTPATIENT)
Dept: UROLOGY | Facility: CLINIC | Age: 77
End: 2022-07-12

## 2022-07-12 DIAGNOSIS — E29.1 MALE HYPOGONADISM: Primary | ICD-10-CM

## 2022-07-12 PROCEDURE — 99999 PR PBB SHADOW E&M-EST. PATIENT-LVL III: ICD-10-PCS | Mod: PBBFAC,,,

## 2022-07-12 PROCEDURE — 99213 OFFICE O/P EST LOW 20 MIN: CPT | Mod: PBBFAC,PO

## 2022-07-12 PROCEDURE — 96372 THER/PROPH/DIAG INJ SC/IM: CPT | Mod: PBBFAC,PO

## 2022-07-12 PROCEDURE — 99999 PR PBB SHADOW E&M-EST. PATIENT-LVL III: CPT | Mod: PBBFAC,,,

## 2022-07-12 RX ORDER — TESTOSTERONE CYPIONATE 200 MG/ML
150 INJECTION, SOLUTION INTRAMUSCULAR
Status: COMPLETED | OUTPATIENT
Start: 2022-07-12 | End: 2022-09-20

## 2022-07-12 RX ADMIN — TESTOSTERONE CYPIONATE 150 MG: 200 INJECTION INTRAMUSCULAR at 11:07

## 2022-07-21 ENCOUNTER — TELEPHONE (OUTPATIENT)
Dept: UROLOGY | Facility: CLINIC | Age: 77
End: 2022-07-21
Payer: MEDICARE

## 2022-07-26 ENCOUNTER — CLINICAL SUPPORT (OUTPATIENT)
Dept: UROLOGY | Facility: CLINIC | Age: 77
End: 2022-07-26
Payer: MEDICARE

## 2022-07-26 DIAGNOSIS — E29.1 MALE HYPOGONADISM: Primary | ICD-10-CM

## 2022-07-26 PROCEDURE — 96372 THER/PROPH/DIAG INJ SC/IM: CPT | Mod: PBBFAC,PO

## 2022-07-26 PROCEDURE — 99212 OFFICE O/P EST SF 10 MIN: CPT | Mod: PBBFAC,PO

## 2022-07-26 PROCEDURE — 99999 PR PBB SHADOW E&M-EST. PATIENT-LVL II: CPT | Mod: PBBFAC,,,

## 2022-07-26 PROCEDURE — 99999 PR PBB SHADOW E&M-EST. PATIENT-LVL II: ICD-10-PCS | Mod: PBBFAC,,,

## 2022-07-26 RX ADMIN — TESTOSTERONE CYPIONATE 150 MG: 200 INJECTION INTRAMUSCULAR at 11:07

## 2022-07-27 NOTE — PROGRESS NOTES
[place] [address] [address]  P [phone]  F [phone]    PATIENT:  Freddie Dc   : 1928  Referring physician:Vick Dalton MD  CHIEF COMPLAINT:   Chief Complaint   Patient presents with   • Follow-up     6 month      HISTORY OF PRESENT ILLNESS:  Freddie is a 89 year old male patient with a history of CAD s/p CABG, PAD, HTN, HLD, CKD, claudication, who is presenting for a cardiovascular follow-up visit here with his daughter. The patient is fully vaccinated against Covid-19.     Last seen 22. His shoulder pain noted last ov had resolved.    Seen in [place] ED 22 for black stool and generalized weakness. During the visit mental confusion was noted. Hgb was found to be low, Cr elevated. Plavix was stopped he was discharged once stabilized with dagnosis of possible upper GI bleed. Losartan was changed to Valsartan 80   He followed with his PCP Dr. Dalton, last seen 22. At the time he was still off his Plavix.    Here with daughter today. His daughter indicates the patient will see GI in August. She notes fatigue with his BP medications in the morning.    The patient has been doing well overall from a cardiac perspective. No chest pain, shortness of breath, orthopnea or PND. No dizziness or syncope. No palpitations. No lower extremity claudication. The patient is mild to moderately active and walks with a cane and walker without any significant symptoms at this time. He can go up and down a flight of stairs without any cardiac symptoms. Medication list reviewed, patient is compliant without side effects.  Patient denies any pain in his legs or any cold temperature color change.  No gangrene or ulcerations..  Patient denies any cough, cold, fever, chills, or contact with any COVID positive patient.      PAST MEDICAL HISTORY:    Past Medical History:   Diagnosis Date   • Abnormal cardiac function test     with small defect Myoview in , he was  Pt received 150 mg testosterone injection IM in LUQ of buttocks. Pt tolerated procedure well. Pt refused to stay for observation.   asymptomatic and was managed medically by my partner prior to knee surgery.   • CAD (coronary artery disease)     CABG- 2015.   • CKD (chronic kidney disease) stage 3, GFR 30-59 ml/min (CMS/[facility]) 1/16/2019   • Claudication (CMS/[facility]) 1/16/2019   • Essential hypertension    • History of total knee replacement (TKR)     right   • Hx of CABG    • Mixed hyperlipidemia     hx mild rhabdo. ( ) 2016; pt refuses further trial of statin or PCSK9.   • PAD (peripheral artery disease) (CMS/[facility])     6/18 MARIA ISABEL's, 1.4 mnp for 6 minutes @ 2.6 incline R= 0.67> 0.34 post exercise; L= 0.52>0.6 post exercise.   • TIA (transient ischemic attack) 7/14/2020     REVIEW OF SYSTEMS:    Constitutional: Negative.    HENT: Negative.    Eyes: Negative.    Respiratory: Negative.    Cardiovascular:        SEE HPI   Endocrine: Negative.    Genitourinary: Negative.    Skin: Negative.    Allergic/Immunologic: Negative.    Neurological: Negative.    Hematological: Negative.    Psychiatric/Behavioral: Negative.      ALLERGIES:    ALLERGIES:   Allergen Reactions   • Atorvastatin Calcium-Polysorbate 80 Other (See Comments)     Rhabdomyelosis   • Hydrocodone-Acetaminophen Other (See Comments)     Hallucinations     • Lisinopril RASH   • Tramadol Hcl Other (See Comments)     Hallucinations     MEDICATIONS:     Current Outpatient Medications   Medication Sig Dispense Refill   • metoPROLOL tartrate (LOPRESSOR) 25 MG tablet TAKE ONE TABLET BY MOUTH EVERY TWELVE HOURS 180 tablet 0   • ferrous sulfate 325 (65 FE) MG tablet Take 1 tablet by mouth daily (with breakfast). Do not start before June 6, 2022.     • Multiple Vitamins-Minerals (ICAPS AREDS 2 PO) Take 1 capsule by mouth every evening.      • rosuvastatin (CRESTOR) 10 MG tablet Take 1 tablet by mouth daily. (Patient taking differently: Take 10 mg by mouth every evening.) 90 tablet 0   • coenzyme Q10 100 MG capsule Take 100 mg by mouth every morning.      • Multiple Vitamins-Minerals (MULTIVITAMIN  ADULT PO) Take 1 tablet by mouth every evening.        No current facility-administered medications for this visit.     SOCIAL HISTORY:    Social History     Tobacco Use   • Smoking status: Never Smoker   • Smokeless tobacco: Current User     Types: Chew   Vaping Use   • Vaping Use: never used   Substance Use Topics   • Alcohol use: Not Currently     Alcohol/week: 14.0 standard drinks     Types: 14 Standard drinks or equivalent per week   • Drug use: Never     FAMILY HISTORY:    Family History   Problem Relation Age of Onset   • Patient is unaware of any medical problems Mother    • Patient is unaware of any medical problems Father      PHYSICAL EXAMINATION:  /69 (BP Location: RUE - Right upper extremity)   Pulse 85   Temp 98.2 °F (36.8 °C)   Ht 5' 8\" (1.727 m)   Wt 86 kg (189 lb 9.5 oz)   BMI 28.83 kg/m²   BSA 2 m²   Constitutional: Oriented to person, place, and time. Appears well-developed and well-nourished.   HENT:   Head: Normocephalic and atraumatic.   Mouth/Throat: Oropharynx is clear and moist.   Neck: Normal range of motion. Neck supple.   Cardiovascular: Normal rate, regular rhythm, S1 normal, S2 normal and intact distal pulses. Exam reveals no S3, no S4 and no friction rub.   No murmur heard.  Pulmonary/Chest: Breath sounds normal. No wheezes. No rales.   Abdominal: Soft. Bowel sounds are normal. Exhibits no distension. There is no tenderness.   Musculoskeletal: Normal range of motion. Exhibits no edema or tenderness.   Neurological: Alert and oriented to person, place, and time.   Skin: Skin is warm and dry. No rash noted.   Psychiatric: Normal mood and affect. Behavior is normal.   Nursing note and vitals reviewed.  Extremities: No edema.  No ulcers or gangrene's.    I personally reviewed and interpreted recent laboratory values in the chart.    CARDIAC TESTING/IMAGING  I have reviewed the pertinent imaging study reports. These are the pertinent findings:    6/30/22: Iron 113, TIBC 290  (L)    · Labs -   · 6/30/22: Cr 1.73 (H), K 4.89, Hgb 11.2 (L), , LDL 42, HDL 45, A1c 5.0%  · 6/5/22: Hgb 9.2 (L)  · 6/4/22: Cr 1.55 (H), K 4.0, Hgb 8.6 (L),   · 1/6/22: Cr 1.55(H), K 4.63, Cholesterol 113, HDL 45, LDL 43, Hgb 14.6, A1c 5.6%  · Labs - 7/6/21: Hgb 14.1, K 4.91, Cr 1.61  · Labs - (7/6/21): Cholesterol 100, HDL 48, LDL 32, A1c 5.5%, TSH 2.030  · TTE - (1/6/20): Normal left ventricular size and systolic function with a normal estimated ejection fraction. Estimated ejection fraction of 55-60%. Mild diastolic dysfunction. No significant valvular abnormalities. Bubble study negative for right-to-left shunt.  · Event Monitor - 2/26/2020: Underlying rhythm is NSR. A single patient triggered episode was noted with the exact symptom not listed. Rhythm during this time was sinus bradycardia. Occasional PVCs were noted. No evidence of A-fib.  · Coronary angiogram - 11/2/15: Severe 3-vessel coronary artery disease. Preserved left ventricular systolic function by the Cardiolite.Left main trunk.  Left main trunk showed no stenosis.  LAD was a large caliber vessel, which revealed mild luminal irregularities in its proximal segment and then it was 95% stenosed at the bifurcation involving the origin of a diagonal branch, which was a large branch, was again 90% stenosed at its origin and proximal segment.  The LAD itself was chronically totally occluded in its mid segment and mid distal LAD was seen to be supplied by ipsilateral and contralateral collaterals.  The ramus was a medium caliber vessel, which had a 70% smooth short stenosis.  The circumflex was small and showed some luminal irregularities.  RCA was a large dominant vessel, which showed its ostium to be calcified with a 90% tight stenosis, followed by a mid 70% hazy calcified stenosis, followed by a large CASPER showing an ostial 80% stenosis in its mid 70% to 80% stenosis.  The left PDA showed luminal irregularities.     ASSESSMENT/PLAN    Coronary  artery disease involving native coronary artery of native heart without angina pectoris  hx CABG 2015  - no concerning symptoms of angina/ischemia continue current medical management.   Off antiplatelet agents because of GI bleed.  Has appointment with GI in August and would ask them regarding starting 81 mg of aspirin.    Shoulder pain/scapular tenderness  Resolved.  Had chronic musculoskeletal symptoms pain and tenderness with palpation     TIA    MRI head/carotid US negative he was started on DAPT by neuro for 21 days then plavix alone event monitor in February 2020 was unremarkable with no evidence of A. fib.  No further issues.   He was taken off Plavix 6/3/22 for his suspected upper GI bleed. Recent Hgb 6/30/22 was 11.2 (L), up from 6/5/22 which was 9.2 (L).  They have an appointment with gastroenterologist and would inquire with them regarding starting aspirin 81 mg daily    PAD (peripheral artery disease) (CMS/[facility])  6/18 MARIA ISABEL's, 1.4 mnp for 6 minutes @ 2.6 incline R= 0.67> 0.34 post exercise; L= 0.52>0.6 post   No lower extremity claudication as per the patient.  No gangrene's or ulcerations.  Continue conservative management as he has declined any angiograms or any invasive procedures.    Hx of CABG  Hx of in 2015     Essential hypertension  He does not check his BP at home. The patient's daughter notes fatigue and low BP at home when taking his morning BP meds. We will discontinue Valsartan. Advised him to keep a BP log and send us. His daughter understands to contact us if his BP is consistently over 130/80 or consistently very low. Diet and exercise discussed.      Mixed hyperlipidemia  LDL 42 from 6/30/22 with normal AST and ALT  Patient had rhabdomyolysis on statins. He does not want to try zetia or pcsk9 inhibitors. Diet and exercise discussed.   Started back on low dose statin by pcp now that he is avoiding ETOH as per the daughter     CKD (chronic kidney disease) stage 3, GFR 30-59 ml/min  (CMS/[facility])  6/30/22 Cr 1.73 (H), f/u as per PCP     Leg pain some weakness  Was due to lumbar spinal stenosis.      6/18 walking MARIA ISABEL was very abnormal. He had consultation with Dr. Matthews and peripheral angiogram was recommended, however patient declined procedure. No open wounds or signs of acute limb ischemia. They were instructed to notify us immediatly if he develops any wounds  No longer having pain/weakness in leg d/t lumbar spinal issues. Given advanced age cont conservative measures. He walks with a cane and has no leg pains and weakness is improving also. Patient and daughter understand that he will seek urgent medical attention if any changes in symptoms or new symptoms appear.    Sleep apnea screening   The patient has declined.    Others    Plan of care discussed with the patient. All questions were answered. Patient verbalized understanding and agrees with the plan. Primary care issues are being followed by the primary care physician. A letter has been sent to the referring physician.    Return in about 6 months (around 1/28/2023).    On 07/29/22, Lane DOHERTY scribed the services personally performed by Phyllis Estrella MD    I have reviewed and revised the note above. The documentation recorded by the scribe accurately reflects history obtained, actions preformed and decisions made by me.     Phyllis Estrella MD

## 2022-08-02 ENCOUNTER — PATIENT MESSAGE (OUTPATIENT)
Dept: UROLOGY | Facility: CLINIC | Age: 77
End: 2022-08-02
Payer: MEDICARE

## 2022-08-10 ENCOUNTER — CLINICAL SUPPORT (OUTPATIENT)
Dept: UROLOGY | Facility: CLINIC | Age: 77
End: 2022-08-10
Payer: MEDICARE

## 2022-08-10 DIAGNOSIS — E29.1 HYPOGONADISM, MALE: Primary | ICD-10-CM

## 2022-08-10 DIAGNOSIS — E29.1 MALE HYPOGONADISM: ICD-10-CM

## 2022-08-10 PROCEDURE — 99213 OFFICE O/P EST LOW 20 MIN: CPT | Mod: PBBFAC,PO

## 2022-08-10 PROCEDURE — 99999 PR PBB SHADOW E&M-EST. PATIENT-LVL III: ICD-10-PCS | Mod: PBBFAC,,,

## 2022-08-10 PROCEDURE — 99999 PR PBB SHADOW E&M-EST. PATIENT-LVL III: CPT | Mod: PBBFAC,,,

## 2022-08-10 PROCEDURE — 96372 THER/PROPH/DIAG INJ SC/IM: CPT | Mod: PBBFAC,PO

## 2022-08-10 RX ADMIN — TESTOSTERONE CYPIONATE 150 MG: 200 INJECTION INTRAMUSCULAR at 09:08

## 2022-08-22 ENCOUNTER — PATIENT MESSAGE (OUTPATIENT)
Dept: UROLOGY | Facility: CLINIC | Age: 77
End: 2022-08-22
Payer: MEDICARE

## 2022-08-23 ENCOUNTER — CLINICAL SUPPORT (OUTPATIENT)
Dept: UROLOGY | Facility: CLINIC | Age: 77
End: 2022-08-23
Payer: MEDICARE

## 2022-08-23 PROCEDURE — 99999 PR PBB SHADOW E&M-EST. PATIENT-LVL III: CPT | Mod: PBBFAC,,,

## 2022-08-23 PROCEDURE — 99999 PR PBB SHADOW E&M-EST. PATIENT-LVL III: ICD-10-PCS | Mod: PBBFAC,,,

## 2022-08-23 PROCEDURE — 96372 THER/PROPH/DIAG INJ SC/IM: CPT | Mod: PBBFAC,PO

## 2022-08-23 PROCEDURE — 99213 OFFICE O/P EST LOW 20 MIN: CPT | Mod: PBBFAC,PO

## 2022-08-23 RX ADMIN — TESTOSTERONE CYPIONATE 150 MG: 200 INJECTION INTRAMUSCULAR at 11:08

## 2022-09-06 ENCOUNTER — PATIENT MESSAGE (OUTPATIENT)
Dept: UROLOGY | Facility: CLINIC | Age: 77
End: 2022-09-06

## 2022-09-06 ENCOUNTER — CLINICAL SUPPORT (OUTPATIENT)
Dept: UROLOGY | Facility: CLINIC | Age: 77
End: 2022-09-06
Payer: MEDICARE

## 2022-09-06 DIAGNOSIS — E29.1 HYPOGONADISM, MALE: Primary | ICD-10-CM

## 2022-09-06 PROCEDURE — 99999 PR PBB SHADOW E&M-EST. PATIENT-LVL III: CPT | Mod: PBBFAC,,,

## 2022-09-06 PROCEDURE — 96372 THER/PROPH/DIAG INJ SC/IM: CPT | Mod: PBBFAC,PO

## 2022-09-06 PROCEDURE — 99999 PR PBB SHADOW E&M-EST. PATIENT-LVL III: ICD-10-PCS | Mod: PBBFAC,,,

## 2022-09-06 PROCEDURE — 99213 OFFICE O/P EST LOW 20 MIN: CPT | Mod: PBBFAC,PO

## 2022-09-06 RX ADMIN — TESTOSTERONE CYPIONATE 150 MG: 200 INJECTION INTRAMUSCULAR at 09:09

## 2022-09-19 ENCOUNTER — PATIENT MESSAGE (OUTPATIENT)
Dept: UROLOGY | Facility: CLINIC | Age: 77
End: 2022-09-19
Payer: MEDICARE

## 2022-09-20 ENCOUNTER — CLINICAL SUPPORT (OUTPATIENT)
Dept: UROLOGY | Facility: CLINIC | Age: 77
End: 2022-09-20
Payer: MEDICARE

## 2022-09-20 DIAGNOSIS — E29.1 HYPOGONADISM, MALE: Primary | ICD-10-CM

## 2022-09-20 PROCEDURE — 99999 PR PBB SHADOW E&M-EST. PATIENT-LVL III: CPT | Mod: PBBFAC,,,

## 2022-09-20 PROCEDURE — 99213 OFFICE O/P EST LOW 20 MIN: CPT | Mod: PBBFAC,25,PO

## 2022-09-20 PROCEDURE — 96372 THER/PROPH/DIAG INJ SC/IM: CPT | Mod: PBBFAC,PO

## 2022-09-20 PROCEDURE — 99999 PR PBB SHADOW E&M-EST. PATIENT-LVL III: ICD-10-PCS | Mod: PBBFAC,,,

## 2022-09-20 RX ADMIN — TESTOSTERONE CYPIONATE 150 MG: 200 INJECTION INTRAMUSCULAR at 03:09

## 2022-09-20 NOTE — PROGRESS NOTES
Testosterone 150  injection given, see MAR. Pt tolerated well, recommended to wait twenty minutes after injection to observe for adverse reaction. Pt declined to wait.

## 2022-09-22 ENCOUNTER — PATIENT MESSAGE (OUTPATIENT)
Dept: UROLOGY | Facility: CLINIC | Age: 77
End: 2022-09-22
Payer: MEDICARE

## 2022-09-23 ENCOUNTER — LAB VISIT (OUTPATIENT)
Dept: LAB | Facility: HOSPITAL | Age: 77
End: 2022-09-23
Attending: UROLOGY
Payer: MEDICARE

## 2022-09-23 DIAGNOSIS — E29.1 MALE HYPOGONADISM: ICD-10-CM

## 2022-09-23 DIAGNOSIS — N40.0 BPH WITHOUT URINARY OBSTRUCTION: ICD-10-CM

## 2022-09-23 LAB
COMPLEXED PSA SERPL-MCNC: 1.5 NG/ML (ref 0–4)
HCT VFR BLD AUTO: 49.3 % (ref 40–54)
HGB BLD-MCNC: 16.1 G/DL (ref 14–18)
TESTOST SERPL-MCNC: 847 NG/DL (ref 304–1227)

## 2022-09-23 PROCEDURE — 36415 COLL VENOUS BLD VENIPUNCTURE: CPT | Mod: PO | Performed by: UROLOGY

## 2022-09-23 PROCEDURE — 84403 ASSAY OF TOTAL TESTOSTERONE: CPT | Performed by: UROLOGY

## 2022-09-23 PROCEDURE — 84153 ASSAY OF PSA TOTAL: CPT | Performed by: UROLOGY

## 2022-09-23 PROCEDURE — 85014 HEMATOCRIT: CPT | Performed by: UROLOGY

## 2022-09-23 PROCEDURE — 85018 HEMOGLOBIN: CPT | Performed by: UROLOGY

## 2022-09-28 ENCOUNTER — PATIENT MESSAGE (OUTPATIENT)
Dept: UROLOGY | Facility: CLINIC | Age: 77
End: 2022-09-28
Payer: MEDICARE

## 2022-10-04 ENCOUNTER — CLINICAL SUPPORT (OUTPATIENT)
Dept: UROLOGY | Facility: CLINIC | Age: 77
End: 2022-10-04
Payer: MEDICARE

## 2022-10-04 DIAGNOSIS — E29.1 HYPOGONADISM, MALE: Primary | ICD-10-CM

## 2022-10-04 PROCEDURE — 99213 OFFICE O/P EST LOW 20 MIN: CPT | Mod: PBBFAC,25,PO

## 2022-10-04 PROCEDURE — 99999 PR PBB SHADOW E&M-EST. PATIENT-LVL III: CPT | Mod: PBBFAC,,,

## 2022-10-04 PROCEDURE — 96372 THER/PROPH/DIAG INJ SC/IM: CPT | Mod: PBBFAC,PO

## 2022-10-04 PROCEDURE — 99999 PR PBB SHADOW E&M-EST. PATIENT-LVL III: ICD-10-PCS | Mod: PBBFAC,,,

## 2022-10-04 RX ORDER — TESTOSTERONE CYPIONATE 200 MG/ML
150 INJECTION, SOLUTION INTRAMUSCULAR
Status: COMPLETED | OUTPATIENT
Start: 2022-10-04 | End: 2022-12-19

## 2022-10-04 RX ADMIN — TESTOSTERONE CYPIONATE 150 MG: 200 INJECTION INTRAMUSCULAR at 11:10

## 2022-10-18 ENCOUNTER — CLINICAL SUPPORT (OUTPATIENT)
Dept: UROLOGY | Facility: CLINIC | Age: 77
End: 2022-10-18
Payer: MEDICARE

## 2022-10-18 PROCEDURE — 96372 THER/PROPH/DIAG INJ SC/IM: CPT | Mod: PBBFAC,PO

## 2022-10-18 RX ADMIN — TESTOSTERONE CYPIONATE 150 MG: 200 INJECTION INTRAMUSCULAR at 10:10

## 2022-10-18 NOTE — PROGRESS NOTES
Testosterone 150mg injection given per MAR. Patient tolerated well. Patient Refused to stay 20 minutes post injection, see Mar.

## 2022-10-21 ENCOUNTER — OFFICE VISIT (OUTPATIENT)
Dept: UROLOGY | Facility: CLINIC | Age: 77
End: 2022-10-21
Payer: MEDICARE

## 2022-10-21 VITALS — WEIGHT: 158.69 LBS | HEIGHT: 66 IN | BODY MASS INDEX: 25.5 KG/M2

## 2022-10-21 DIAGNOSIS — N40.0 BPH WITHOUT URINARY OBSTRUCTION: ICD-10-CM

## 2022-10-21 DIAGNOSIS — R79.89 LOW TESTOSTERONE LEVEL IN MALE: Primary | ICD-10-CM

## 2022-10-21 PROCEDURE — 99214 OFFICE O/P EST MOD 30 MIN: CPT | Mod: S$PBB,,, | Performed by: UROLOGY

## 2022-10-21 PROCEDURE — 99999 PR PBB SHADOW E&M-EST. PATIENT-LVL III: ICD-10-PCS | Mod: PBBFAC,,, | Performed by: UROLOGY

## 2022-10-21 PROCEDURE — 99999 PR PBB SHADOW E&M-EST. PATIENT-LVL III: CPT | Mod: PBBFAC,,, | Performed by: UROLOGY

## 2022-10-21 PROCEDURE — 99213 OFFICE O/P EST LOW 20 MIN: CPT | Mod: PBBFAC,PO | Performed by: UROLOGY

## 2022-10-21 PROCEDURE — 99214 PR OFFICE/OUTPT VISIT, EST, LEVL IV, 30-39 MIN: ICD-10-PCS | Mod: S$PBB,,, | Performed by: UROLOGY

## 2022-10-21 NOTE — PROGRESS NOTES
Subjective:       Patient ID: Mohit Schneider is a 77 y.o. male.    Chief Complaint: No chief complaint on file.    HPI    77 year old with low testosterone.  He has done well on IM testosterone replacement.  He is here for follow up and lab check.   His current dose is 150 mg every 2 weeks.  He has increased energy level. His peak testosterone is 847. H&H are normal.   PSA 1.5.  He has no bothersome voiding symptoms.  He denies hematuria and dysuria.  Urine dipstick shows negative for all components except trace blood.    Component PSA Diagnostic   Latest Ref Rng & Units 0.00 - 4.00 ng/mL   9/23/2022 1.5   9/25/2021 1.4   10/2/2020 1.3     Component Testosterone, Total   Latest Ref Rng & Units 304 - 1227 ng/dL   9/23/2022 847   9/25/2021 820   10/2/2020 1195   9/7/2019 1102       Review of Systems   Constitutional:  Negative for fever.   Genitourinary:  Negative for dysuria and hematuria.     Objective:      Physical Exam  Vitals reviewed.   Constitutional:       Appearance: He is well-developed.   HENT:      Head: Normocephalic and atraumatic.   Eyes:      Conjunctiva/sclera: Conjunctivae normal.   Cardiovascular:      Rate and Rhythm: Normal rate.   Pulmonary:      Effort: Pulmonary effort is normal.   Genitourinary:     Prostate: Enlarged (40g, s/s/a). Not tender.      Rectum: No mass. Normal anal tone.   Musculoskeletal:         General: Normal range of motion.   Skin:     General: Skin is warm and dry.      Findings: No rash.   Neurological:      Mental Status: He is alert and oriented to person, place, and time.       Assessment:       1. Low testosterone level in male    2. BPH without urinary obstruction        Plan:       Low testosterone level in male    BPH without urinary obstruction      Continue testosterone at current dose.  Annual follow-up

## 2022-10-25 ENCOUNTER — CLINICAL SUPPORT (OUTPATIENT)
Dept: CARDIOLOGY | Facility: HOSPITAL | Age: 77
End: 2022-10-25
Payer: MEDICARE

## 2022-10-25 ENCOUNTER — LAB VISIT (OUTPATIENT)
Dept: LAB | Facility: HOSPITAL | Age: 77
End: 2022-10-25
Payer: MEDICARE

## 2022-10-25 DIAGNOSIS — I10 ESSENTIAL HYPERTENSION, BENIGN: ICD-10-CM

## 2022-10-25 DIAGNOSIS — E78.2 MIXED HYPERLIPIDEMIA: ICD-10-CM

## 2022-10-25 LAB
ALBUMIN SERPL BCP-MCNC: 3.7 G/DL (ref 3.5–5.2)
ALBUMIN/CREAT UR: 25 UG/MG (ref 0–30)
ALP SERPL-CCNC: 64 U/L (ref 55–135)
ALT SERPL W/O P-5'-P-CCNC: <5 U/L (ref 10–44)
ANION GAP SERPL CALC-SCNC: 11 MMOL/L (ref 8–16)
AST SERPL-CCNC: 18 U/L (ref 10–40)
BILIRUB SERPL-MCNC: 1.4 MG/DL (ref 0.1–1)
BUN SERPL-MCNC: 32 MG/DL (ref 8–23)
CALCIUM SERPL-MCNC: 9.6 MG/DL (ref 8.7–10.5)
CHLORIDE SERPL-SCNC: 106 MMOL/L (ref 95–110)
CHOLEST SERPL-MCNC: 174 MG/DL (ref 120–199)
CHOLEST/HDLC SERPL: 2.9 {RATIO} (ref 2–5)
CO2 SERPL-SCNC: 25 MMOL/L (ref 23–29)
CREAT SERPL-MCNC: 1.5 MG/DL (ref 0.5–1.4)
CREAT UR-MCNC: 140 MG/DL (ref 23–375)
EST. GFR  (NO RACE VARIABLE): 48.2 ML/MIN/1.73 M^2
GLUCOSE SERPL-MCNC: 95 MG/DL (ref 70–110)
HDLC SERPL-MCNC: 61 MG/DL (ref 40–75)
HDLC SERPL: 35.1 % (ref 20–50)
LDLC SERPL CALC-MCNC: 95.8 MG/DL (ref 63–159)
MICROALBUMIN UR DL<=1MG/L-MCNC: 35 UG/ML
NONHDLC SERPL-MCNC: 113 MG/DL
POTASSIUM SERPL-SCNC: 4.3 MMOL/L (ref 3.5–5.1)
PROT SERPL-MCNC: 7.7 G/DL (ref 6–8.4)
SODIUM SERPL-SCNC: 142 MMOL/L (ref 136–145)
TRIGL SERPL-MCNC: 86 MG/DL (ref 30–150)
TSH SERPL DL<=0.005 MIU/L-ACNC: 1.72 UIU/ML (ref 0.4–4)

## 2022-10-25 PROCEDURE — 82043 UR ALBUMIN QUANTITATIVE: CPT | Performed by: NURSE PRACTITIONER

## 2022-10-25 PROCEDURE — 93010 ELECTROCARDIOGRAM REPORT: CPT | Mod: ,,, | Performed by: INTERNAL MEDICINE

## 2022-10-25 PROCEDURE — 36415 COLL VENOUS BLD VENIPUNCTURE: CPT | Mod: PO | Performed by: NURSE PRACTITIONER

## 2022-10-25 PROCEDURE — 82570 ASSAY OF URINE CREATININE: CPT | Performed by: NURSE PRACTITIONER

## 2022-10-25 PROCEDURE — 93010 EKG 12-LEAD: ICD-10-PCS | Mod: ,,, | Performed by: INTERNAL MEDICINE

## 2022-10-25 PROCEDURE — 84443 ASSAY THYROID STIM HORMONE: CPT | Performed by: NURSE PRACTITIONER

## 2022-10-25 PROCEDURE — 80053 COMPREHEN METABOLIC PANEL: CPT | Performed by: NURSE PRACTITIONER

## 2022-10-25 PROCEDURE — 93005 ELECTROCARDIOGRAM TRACING: CPT | Mod: PO

## 2022-10-25 PROCEDURE — 80061 LIPID PANEL: CPT | Performed by: NURSE PRACTITIONER

## 2022-11-07 ENCOUNTER — CLINICAL SUPPORT (OUTPATIENT)
Dept: UROLOGY | Facility: CLINIC | Age: 77
End: 2022-11-07
Payer: MEDICARE

## 2022-11-07 DIAGNOSIS — R79.89 LOW TESTOSTERONE LEVEL IN MALE: Primary | ICD-10-CM

## 2022-11-07 PROCEDURE — 96372 THER/PROPH/DIAG INJ SC/IM: CPT | Mod: PBBFAC,PO

## 2022-11-07 PROCEDURE — 99999 PR PBB SHADOW E&M-EST. PATIENT-LVL II: CPT | Mod: PBBFAC,,,

## 2022-11-07 PROCEDURE — 99212 OFFICE O/P EST SF 10 MIN: CPT | Mod: PBBFAC,PO

## 2022-11-07 PROCEDURE — 99999 PR PBB SHADOW E&M-EST. PATIENT-LVL II: ICD-10-PCS | Mod: PBBFAC,,,

## 2022-11-07 RX ADMIN — TESTOSTERONE CYPIONATE 150 MG: 200 INJECTION INTRAMUSCULAR at 03:11

## 2022-11-21 ENCOUNTER — CLINICAL SUPPORT (OUTPATIENT)
Dept: UROLOGY | Facility: CLINIC | Age: 77
End: 2022-11-21
Payer: MEDICARE

## 2022-11-21 DIAGNOSIS — R79.89 LOW TESTOSTERONE LEVEL IN MALE: Primary | ICD-10-CM

## 2022-11-21 PROCEDURE — 96372 THER/PROPH/DIAG INJ SC/IM: CPT | Mod: PBBFAC,PO

## 2022-11-21 PROCEDURE — 99999 PR PBB SHADOW E&M-EST. PATIENT-LVL II: ICD-10-PCS | Mod: PBBFAC,,,

## 2022-11-21 PROCEDURE — 99212 OFFICE O/P EST SF 10 MIN: CPT | Mod: PBBFAC,PO

## 2022-11-21 PROCEDURE — 99999 PR PBB SHADOW E&M-EST. PATIENT-LVL II: CPT | Mod: PBBFAC,,,

## 2022-11-21 RX ADMIN — TESTOSTERONE CYPIONATE 150 MG: 200 INJECTION INTRAMUSCULAR at 02:11

## 2022-12-05 ENCOUNTER — CLINICAL SUPPORT (OUTPATIENT)
Dept: UROLOGY | Facility: CLINIC | Age: 77
End: 2022-12-05
Payer: MEDICARE

## 2022-12-05 DIAGNOSIS — R79.89 LOW TESTOSTERONE LEVEL IN MALE: Primary | ICD-10-CM

## 2022-12-05 PROCEDURE — 99212 OFFICE O/P EST SF 10 MIN: CPT | Mod: PBBFAC,PO

## 2022-12-05 PROCEDURE — 96372 THER/PROPH/DIAG INJ SC/IM: CPT | Mod: PBBFAC,PO

## 2022-12-05 PROCEDURE — 99999 PR PBB SHADOW E&M-EST. PATIENT-LVL II: ICD-10-PCS | Mod: PBBFAC,,,

## 2022-12-05 PROCEDURE — 99999 PR PBB SHADOW E&M-EST. PATIENT-LVL II: CPT | Mod: PBBFAC,,,

## 2022-12-05 RX ADMIN — TESTOSTERONE CYPIONATE 150 MG: 200 INJECTION INTRAMUSCULAR at 03:12

## 2022-12-19 ENCOUNTER — CLINICAL SUPPORT (OUTPATIENT)
Dept: UROLOGY | Facility: CLINIC | Age: 77
End: 2022-12-19
Payer: MEDICARE

## 2022-12-19 DIAGNOSIS — R79.89 LOW TESTOSTERONE LEVEL IN MALE: Primary | ICD-10-CM

## 2022-12-19 PROCEDURE — 99999 PR PBB SHADOW E&M-EST. PATIENT-LVL II: ICD-10-PCS | Mod: PBBFAC,,,

## 2022-12-19 PROCEDURE — 99212 OFFICE O/P EST SF 10 MIN: CPT | Mod: PBBFAC,PO

## 2022-12-19 PROCEDURE — 99999 PR PBB SHADOW E&M-EST. PATIENT-LVL II: CPT | Mod: PBBFAC,,,

## 2022-12-19 PROCEDURE — 96372 THER/PROPH/DIAG INJ SC/IM: CPT | Mod: PBBFAC,PO

## 2022-12-19 RX ADMIN — TESTOSTERONE CYPIONATE 150 MG: 200 INJECTION INTRAMUSCULAR at 03:12

## 2023-01-03 ENCOUNTER — CLINICAL SUPPORT (OUTPATIENT)
Dept: UROLOGY | Facility: CLINIC | Age: 78
End: 2023-01-03
Payer: MEDICARE

## 2023-01-03 DIAGNOSIS — R79.89 LOW TESTOSTERONE LEVEL IN MALE: Primary | ICD-10-CM

## 2023-01-03 PROCEDURE — 99999 PR PBB SHADOW E&M-EST. PATIENT-LVL II: ICD-10-PCS | Mod: PBBFAC,,,

## 2023-01-03 PROCEDURE — 99212 OFFICE O/P EST SF 10 MIN: CPT | Mod: PBBFAC,PO

## 2023-01-03 PROCEDURE — 96372 THER/PROPH/DIAG INJ SC/IM: CPT | Mod: PBBFAC,PO

## 2023-01-03 PROCEDURE — 99999 PR PBB SHADOW E&M-EST. PATIENT-LVL II: CPT | Mod: PBBFAC,,,

## 2023-01-03 RX ORDER — TESTOSTERONE CYPIONATE 200 MG/ML
150 INJECTION, SOLUTION INTRAMUSCULAR
Status: COMPLETED | OUTPATIENT
Start: 2023-01-03 | End: 2023-03-14

## 2023-01-03 RX ADMIN — TESTOSTERONE CYPIONATE 150 MG: 200 INJECTION, SOLUTION INTRAMUSCULAR at 03:01

## 2023-01-16 ENCOUNTER — PATIENT MESSAGE (OUTPATIENT)
Dept: UROLOGY | Facility: CLINIC | Age: 78
End: 2023-01-16
Payer: MEDICARE

## 2023-01-18 ENCOUNTER — PATIENT MESSAGE (OUTPATIENT)
Dept: UROLOGY | Facility: CLINIC | Age: 78
End: 2023-01-18

## 2023-01-18 ENCOUNTER — CLINICAL SUPPORT (OUTPATIENT)
Dept: UROLOGY | Facility: CLINIC | Age: 78
End: 2023-01-18
Payer: MEDICARE

## 2023-01-18 DIAGNOSIS — R79.89 LOW TESTOSTERONE LEVEL IN MALE: Primary | ICD-10-CM

## 2023-01-18 PROCEDURE — 96372 THER/PROPH/DIAG INJ SC/IM: CPT | Mod: PBBFAC,PO

## 2023-01-18 PROCEDURE — 99212 OFFICE O/P EST SF 10 MIN: CPT | Mod: PBBFAC,25,PO

## 2023-01-18 PROCEDURE — 99999 PR PBB SHADOW E&M-EST. PATIENT-LVL II: ICD-10-PCS | Mod: PBBFAC,,,

## 2023-01-18 PROCEDURE — 99999 PR PBB SHADOW E&M-EST. PATIENT-LVL II: CPT | Mod: PBBFAC,,,

## 2023-01-18 RX ADMIN — TESTOSTERONE CYPIONATE 150 MG: 200 INJECTION, SOLUTION INTRAMUSCULAR at 02:01

## 2023-01-31 ENCOUNTER — CLINICAL SUPPORT (OUTPATIENT)
Dept: UROLOGY | Facility: CLINIC | Age: 78
End: 2023-01-31
Payer: MEDICARE

## 2023-01-31 DIAGNOSIS — R79.89 LOW TESTOSTERONE LEVEL IN MALE: Primary | ICD-10-CM

## 2023-01-31 PROCEDURE — 99999 PR PBB SHADOW E&M-EST. PATIENT-LVL II: ICD-10-PCS | Mod: PBBFAC,,,

## 2023-01-31 PROCEDURE — 99212 OFFICE O/P EST SF 10 MIN: CPT | Mod: PBBFAC,PO

## 2023-01-31 PROCEDURE — 96372 THER/PROPH/DIAG INJ SC/IM: CPT | Mod: PBBFAC,PO

## 2023-01-31 PROCEDURE — 99999 PR PBB SHADOW E&M-EST. PATIENT-LVL II: CPT | Mod: PBBFAC,,,

## 2023-01-31 RX ADMIN — TESTOSTERONE CYPIONATE 150 MG: 200 INJECTION, SOLUTION INTRAMUSCULAR at 03:01

## 2023-02-14 ENCOUNTER — CLINICAL SUPPORT (OUTPATIENT)
Dept: UROLOGY | Facility: CLINIC | Age: 78
End: 2023-02-14
Payer: MEDICARE

## 2023-02-14 DIAGNOSIS — R79.89 LOW TESTOSTERONE LEVEL IN MALE: Primary | ICD-10-CM

## 2023-02-14 PROCEDURE — 99212 OFFICE O/P EST SF 10 MIN: CPT | Mod: PBBFAC,PO

## 2023-02-14 PROCEDURE — 96372 THER/PROPH/DIAG INJ SC/IM: CPT | Mod: PBBFAC,PO

## 2023-02-14 PROCEDURE — 99999 PR PBB SHADOW E&M-EST. PATIENT-LVL II: ICD-10-PCS | Mod: PBBFAC,,,

## 2023-02-14 PROCEDURE — 99999 PR PBB SHADOW E&M-EST. PATIENT-LVL II: CPT | Mod: PBBFAC,,,

## 2023-02-14 RX ADMIN — TESTOSTERONE CYPIONATE 150 MG: 200 INJECTION, SOLUTION INTRAMUSCULAR at 03:02

## 2023-02-15 NOTE — PROGRESS NOTES
Pt received 150 mg testosterone injection IM in LUQ of buttocks. Pt tolerated procedure well. Pt refused to stay for observation. Pt verbalized understand of the risk with leaving the clinic.    
Bleeding that does not stop

## 2023-02-28 ENCOUNTER — CLINICAL SUPPORT (OUTPATIENT)
Dept: UROLOGY | Facility: CLINIC | Age: 78
End: 2023-02-28
Payer: MEDICARE

## 2023-02-28 DIAGNOSIS — R79.89 LOW TESTOSTERONE LEVEL IN MALE: Primary | ICD-10-CM

## 2023-02-28 PROCEDURE — 99999 PR PBB SHADOW E&M-EST. PATIENT-LVL II: CPT | Mod: PBBFAC,,,

## 2023-02-28 PROCEDURE — 96372 THER/PROPH/DIAG INJ SC/IM: CPT | Mod: PBBFAC,PO

## 2023-02-28 PROCEDURE — 99212 OFFICE O/P EST SF 10 MIN: CPT | Mod: PBBFAC,PO

## 2023-02-28 PROCEDURE — 99999 PR PBB SHADOW E&M-EST. PATIENT-LVL II: ICD-10-PCS | Mod: PBBFAC,,,

## 2023-02-28 RX ADMIN — TESTOSTERONE CYPIONATE 150 MG: 200 INJECTION, SOLUTION INTRAMUSCULAR at 04:02

## 2023-02-28 NOTE — PROGRESS NOTES
Testosterone 200 mg injection given, see MAR. Pt tolerated well, recommended to wait twenty minutes after injection to observe for adverse reaction. Pt declined to wait.

## 2023-03-14 ENCOUNTER — CLINICAL SUPPORT (OUTPATIENT)
Dept: UROLOGY | Facility: CLINIC | Age: 78
End: 2023-03-14
Payer: MEDICARE

## 2023-03-14 DIAGNOSIS — R79.89 LOW TESTOSTERONE LEVEL IN MALE: Primary | ICD-10-CM

## 2023-03-14 PROCEDURE — 99999 PR PBB SHADOW E&M-EST. PATIENT-LVL II: ICD-10-PCS | Mod: PBBFAC,,,

## 2023-03-14 PROCEDURE — 96372 THER/PROPH/DIAG INJ SC/IM: CPT | Mod: PBBFAC,PO

## 2023-03-14 PROCEDURE — 99999 PR PBB SHADOW E&M-EST. PATIENT-LVL II: CPT | Mod: PBBFAC,,,

## 2023-03-14 PROCEDURE — 99212 OFFICE O/P EST SF 10 MIN: CPT | Mod: PBBFAC,PO

## 2023-03-14 RX ADMIN — TESTOSTERONE CYPIONATE 150 MG: 200 INJECTION, SOLUTION INTRAMUSCULAR at 03:03

## 2023-03-28 ENCOUNTER — CLINICAL SUPPORT (OUTPATIENT)
Dept: UROLOGY | Facility: CLINIC | Age: 78
End: 2023-03-28
Payer: MEDICARE

## 2023-03-28 DIAGNOSIS — R79.89 LOW TESTOSTERONE LEVEL IN MALE: Primary | ICD-10-CM

## 2023-03-28 PROCEDURE — 96372 THER/PROPH/DIAG INJ SC/IM: CPT | Mod: PBBFAC,PO

## 2023-03-28 RX ORDER — TESTOSTERONE CYPIONATE 200 MG/ML
150 INJECTION, SOLUTION INTRAMUSCULAR
Status: COMPLETED | OUTPATIENT
Start: 2023-03-28 | End: 2023-06-06

## 2023-03-28 RX ADMIN — TESTOSTERONE CYPIONATE 150 MG: 200 INJECTION, SOLUTION INTRAMUSCULAR at 01:03

## 2023-03-28 NOTE — PROGRESS NOTES
Testosterone 150mg injection given on the left side, see MAR. Pt tolerated well, recommended to wait twenty minutes after injection to observe for adverse reaction. Pt declined to wait.

## 2023-04-11 ENCOUNTER — CLINICAL SUPPORT (OUTPATIENT)
Dept: UROLOGY | Facility: CLINIC | Age: 78
End: 2023-04-11
Payer: MEDICARE

## 2023-04-11 PROCEDURE — 96372 THER/PROPH/DIAG INJ SC/IM: CPT | Mod: PBBFAC,PO

## 2023-04-11 RX ADMIN — TESTOSTERONE CYPIONATE 150 MG: 200 INJECTION, SOLUTION INTRAMUSCULAR at 03:04

## 2023-04-11 NOTE — PROGRESS NOTES
Testosterone 150mg injection given on the right side, see MAR. Pt tolerated well, recommended to wait twenty minutes after injection to observe for adverse reaction. Pt declined to wait.

## 2023-04-18 ENCOUNTER — LAB VISIT (OUTPATIENT)
Dept: LAB | Facility: HOSPITAL | Age: 78
End: 2023-04-18
Attending: NURSE PRACTITIONER
Payer: MEDICARE

## 2023-04-18 DIAGNOSIS — E78.2 MIXED HYPERLIPIDEMIA: ICD-10-CM

## 2023-04-18 DIAGNOSIS — I10 ESSENTIAL HYPERTENSION, BENIGN: ICD-10-CM

## 2023-04-18 LAB
ALBUMIN SERPL BCP-MCNC: 4 G/DL (ref 3.5–5.2)
ALBUMIN/CREAT UR: 16.5 UG/MG (ref 0–30)
ALP SERPL-CCNC: 56 U/L (ref 55–135)
ALT SERPL W/O P-5'-P-CCNC: 5 U/L (ref 10–44)
ANION GAP SERPL CALC-SCNC: 10 MMOL/L (ref 8–16)
AST SERPL-CCNC: 23 U/L (ref 10–40)
BILIRUB SERPL-MCNC: 1.5 MG/DL (ref 0.1–1)
BUN SERPL-MCNC: 26 MG/DL (ref 8–23)
CALCIUM SERPL-MCNC: 9.5 MG/DL (ref 8.7–10.5)
CHLORIDE SERPL-SCNC: 102 MMOL/L (ref 95–110)
CHOLEST SERPL-MCNC: 179 MG/DL (ref 120–199)
CHOLEST/HDLC SERPL: 2.8 {RATIO} (ref 2–5)
CO2 SERPL-SCNC: 28 MMOL/L (ref 23–29)
CREAT SERPL-MCNC: 1.5 MG/DL (ref 0.5–1.4)
CREAT UR-MCNC: 133 MG/DL (ref 23–375)
EST. GFR  (NO RACE VARIABLE): 48.2 ML/MIN/1.73 M^2
GLUCOSE SERPL-MCNC: 86 MG/DL (ref 70–110)
HDLC SERPL-MCNC: 65 MG/DL (ref 40–75)
HDLC SERPL: 36.3 % (ref 20–50)
LDLC SERPL CALC-MCNC: 102.8 MG/DL (ref 63–159)
MICROALBUMIN UR DL<=1MG/L-MCNC: 22 UG/ML
NONHDLC SERPL-MCNC: 114 MG/DL
POTASSIUM SERPL-SCNC: 4.3 MMOL/L (ref 3.5–5.1)
PROT SERPL-MCNC: 7.6 G/DL (ref 6–8.4)
SODIUM SERPL-SCNC: 140 MMOL/L (ref 136–145)
TRIGL SERPL-MCNC: 56 MG/DL (ref 30–150)

## 2023-04-18 PROCEDURE — 82570 ASSAY OF URINE CREATININE: CPT | Performed by: NURSE PRACTITIONER

## 2023-04-18 PROCEDURE — 80053 COMPREHEN METABOLIC PANEL: CPT | Performed by: NURSE PRACTITIONER

## 2023-04-18 PROCEDURE — 36415 COLL VENOUS BLD VENIPUNCTURE: CPT | Mod: PO | Performed by: NURSE PRACTITIONER

## 2023-04-18 PROCEDURE — 80061 LIPID PANEL: CPT | Performed by: NURSE PRACTITIONER

## 2023-04-20 PROBLEM — N18.31 CHRONIC KIDNEY DISEASE, STAGE 3A: Status: ACTIVE | Noted: 2023-04-20

## 2023-04-25 ENCOUNTER — CLINICAL SUPPORT (OUTPATIENT)
Dept: UROLOGY | Facility: CLINIC | Age: 78
End: 2023-04-25
Payer: MEDICARE

## 2023-04-25 PROCEDURE — 96372 THER/PROPH/DIAG INJ SC/IM: CPT | Mod: PBBFAC,PO

## 2023-04-25 RX ADMIN — TESTOSTERONE CYPIONATE 150 MG: 200 INJECTION, SOLUTION INTRAMUSCULAR at 03:04

## 2023-04-25 NOTE — PROGRESS NOTES
Testosterone 150mg injection given on left side, see MAR. Pt tolerated well, recommended to wait twenty minutes after injection to observe for adverse reaction. Pt declined to wait.

## 2023-04-28 RX ORDER — MELOXICAM 15 MG/1
TABLET ORAL
Qty: 30 TABLET | Refills: 11 | Status: SHIPPED | OUTPATIENT
Start: 2023-04-28

## 2023-05-09 ENCOUNTER — CLINICAL SUPPORT (OUTPATIENT)
Dept: UROLOGY | Facility: CLINIC | Age: 78
End: 2023-05-09
Payer: MEDICARE

## 2023-05-09 ENCOUNTER — TELEPHONE (OUTPATIENT)
Dept: UROLOGY | Facility: CLINIC | Age: 78
End: 2023-05-09

## 2023-05-09 DIAGNOSIS — R79.89 LOW TESTOSTERONE LEVEL IN MALE: Primary | ICD-10-CM

## 2023-05-09 PROCEDURE — 96372 THER/PROPH/DIAG INJ SC/IM: CPT | Mod: PBBFAC,PO

## 2023-05-09 RX ADMIN — TESTOSTERONE CYPIONATE 150 MG: 200 INJECTION, SOLUTION INTRAMUSCULAR at 11:05

## 2023-05-23 ENCOUNTER — CLINICAL SUPPORT (OUTPATIENT)
Dept: UROLOGY | Facility: CLINIC | Age: 78
End: 2023-05-23
Payer: MEDICARE

## 2023-05-23 DIAGNOSIS — R79.89 LOW TESTOSTERONE LEVEL IN MALE: Primary | ICD-10-CM

## 2023-05-23 PROCEDURE — 99999 PR PBB SHADOW E&M-EST. PATIENT-LVL II: CPT | Mod: PBBFAC,,,

## 2023-05-23 PROCEDURE — 99999 PR PBB SHADOW E&M-EST. PATIENT-LVL II: ICD-10-PCS | Mod: PBBFAC,,,

## 2023-05-23 PROCEDURE — 96372 THER/PROPH/DIAG INJ SC/IM: CPT | Mod: PBBFAC,PO

## 2023-05-23 PROCEDURE — 99212 OFFICE O/P EST SF 10 MIN: CPT | Mod: PBBFAC,25,PO

## 2023-05-23 RX ADMIN — TESTOSTERONE CYPIONATE 150 MG: 200 INJECTION, SOLUTION INTRAMUSCULAR at 11:05

## 2023-05-30 ENCOUNTER — LAB VISIT (OUTPATIENT)
Dept: LAB | Facility: HOSPITAL | Age: 78
End: 2023-05-30
Attending: NURSE PRACTITIONER
Payer: MEDICARE

## 2023-05-30 DIAGNOSIS — R17 ELEVATED BILIRUBIN: ICD-10-CM

## 2023-05-30 DIAGNOSIS — N18.31 CHRONIC KIDNEY DISEASE, STAGE 3A: ICD-10-CM

## 2023-05-30 LAB
ALBUMIN SERPL BCP-MCNC: 3.7 G/DL (ref 3.5–5.2)
ALP SERPL-CCNC: 48 U/L (ref 55–135)
ALT SERPL W/O P-5'-P-CCNC: 5 U/L (ref 10–44)
ANION GAP SERPL CALC-SCNC: 10 MMOL/L (ref 8–16)
AST SERPL-CCNC: 20 U/L (ref 10–40)
BILIRUB SERPL-MCNC: 1.8 MG/DL (ref 0.1–1)
BUN SERPL-MCNC: 15 MG/DL (ref 8–23)
CALCIUM SERPL-MCNC: 9.1 MG/DL (ref 8.7–10.5)
CHLORIDE SERPL-SCNC: 105 MMOL/L (ref 95–110)
CO2 SERPL-SCNC: 25 MMOL/L (ref 23–29)
CREAT SERPL-MCNC: 1 MG/DL (ref 0.5–1.4)
EST. GFR  (NO RACE VARIABLE): >60 ML/MIN/1.73 M^2
GLUCOSE SERPL-MCNC: 90 MG/DL (ref 70–110)
POTASSIUM SERPL-SCNC: 3.9 MMOL/L (ref 3.5–5.1)
PROT SERPL-MCNC: 7 G/DL (ref 6–8.4)
SODIUM SERPL-SCNC: 140 MMOL/L (ref 136–145)

## 2023-05-30 PROCEDURE — 80053 COMPREHEN METABOLIC PANEL: CPT | Performed by: NURSE PRACTITIONER

## 2023-05-30 PROCEDURE — 36415 COLL VENOUS BLD VENIPUNCTURE: CPT | Mod: PO | Performed by: NURSE PRACTITIONER

## 2023-06-06 ENCOUNTER — CLINICAL SUPPORT (OUTPATIENT)
Dept: UROLOGY | Facility: CLINIC | Age: 78
End: 2023-06-06
Payer: MEDICARE

## 2023-06-06 PROCEDURE — 99999 PR PBB SHADOW E&M-EST. PATIENT-LVL II: ICD-10-PCS | Mod: PBBFAC,,,

## 2023-06-06 PROCEDURE — 96372 THER/PROPH/DIAG INJ SC/IM: CPT | Mod: PBBFAC,PO

## 2023-06-06 PROCEDURE — 99212 OFFICE O/P EST SF 10 MIN: CPT | Mod: PBBFAC,PO

## 2023-06-06 PROCEDURE — 99999 PR PBB SHADOW E&M-EST. PATIENT-LVL II: CPT | Mod: PBBFAC,,,

## 2023-06-06 RX ADMIN — TESTOSTERONE CYPIONATE 150 MG: 200 INJECTION, SOLUTION INTRAMUSCULAR at 11:06

## 2023-06-20 ENCOUNTER — CLINICAL SUPPORT (OUTPATIENT)
Dept: UROLOGY | Facility: CLINIC | Age: 78
End: 2023-06-20
Payer: MEDICARE

## 2023-06-20 DIAGNOSIS — R79.89 LOW TESTOSTERONE LEVEL IN MALE: Primary | ICD-10-CM

## 2023-06-20 PROCEDURE — 99999 PR PBB SHADOW E&M-EST. PATIENT-LVL II: ICD-10-PCS | Mod: PBBFAC,,,

## 2023-06-20 PROCEDURE — 99999 PR PBB SHADOW E&M-EST. PATIENT-LVL II: CPT | Mod: PBBFAC,,,

## 2023-06-20 PROCEDURE — 99212 OFFICE O/P EST SF 10 MIN: CPT | Mod: PBBFAC,25,PO

## 2023-06-20 PROCEDURE — 96372 THER/PROPH/DIAG INJ SC/IM: CPT | Mod: PBBFAC,PO

## 2023-06-20 RX ORDER — TESTOSTERONE CYPIONATE 200 MG/ML
150 INJECTION, SOLUTION INTRAMUSCULAR
Status: COMPLETED | OUTPATIENT
Start: 2023-06-20 | End: 2023-09-05

## 2023-06-20 RX ADMIN — TESTOSTERONE CYPIONATE 150 MG: 200 INJECTION, SOLUTION INTRAMUSCULAR at 10:06

## 2023-07-03 ENCOUNTER — CLINICAL SUPPORT (OUTPATIENT)
Dept: UROLOGY | Facility: CLINIC | Age: 78
End: 2023-07-03
Payer: MEDICARE

## 2023-07-03 DIAGNOSIS — E29.1 HYPOGONADISM, MALE: Primary | ICD-10-CM

## 2023-07-03 PROCEDURE — 99212 OFFICE O/P EST SF 10 MIN: CPT | Mod: PBBFAC,25,PO

## 2023-07-03 PROCEDURE — 96372 THER/PROPH/DIAG INJ SC/IM: CPT | Mod: PBBFAC,PO

## 2023-07-03 PROCEDURE — 99999 PR PBB SHADOW E&M-EST. PATIENT-LVL II: ICD-10-PCS | Mod: PBBFAC,,,

## 2023-07-03 PROCEDURE — 99999 PR PBB SHADOW E&M-EST. PATIENT-LVL II: CPT | Mod: PBBFAC,,,

## 2023-07-03 RX ADMIN — TESTOSTERONE CYPIONATE 150 MG: 200 INJECTION, SOLUTION INTRAMUSCULAR at 04:07

## 2023-07-11 ENCOUNTER — PATIENT MESSAGE (OUTPATIENT)
Dept: UROLOGY | Facility: CLINIC | Age: 78
End: 2023-07-11
Payer: MEDICARE

## 2023-07-25 ENCOUNTER — CLINICAL SUPPORT (OUTPATIENT)
Dept: UROLOGY | Facility: CLINIC | Age: 78
End: 2023-07-25
Payer: MEDICARE

## 2023-07-25 DIAGNOSIS — E29.1 HYPOGONADISM, MALE: Primary | ICD-10-CM

## 2023-07-25 PROCEDURE — 99999PBSHW PR PBB SHADOW TECHNICAL ONLY FILED TO HB: Mod: PBBFAC,,,

## 2023-07-25 PROCEDURE — 99999 PR PBB SHADOW E&M-EST. PATIENT-LVL II: CPT | Mod: PBBFAC,,,

## 2023-07-25 PROCEDURE — 96372 THER/PROPH/DIAG INJ SC/IM: CPT | Mod: PBBFAC,PO

## 2023-07-25 PROCEDURE — 99999 PR PBB SHADOW E&M-EST. PATIENT-LVL II: ICD-10-PCS | Mod: PBBFAC,,,

## 2023-07-25 PROCEDURE — 99212 OFFICE O/P EST SF 10 MIN: CPT | Mod: 25,PBBFAC,PO

## 2023-07-25 PROCEDURE — 99999PBSHW PR PBB SHADOW TECHNICAL ONLY FILED TO HB: ICD-10-PCS | Mod: PBBFAC,,,

## 2023-07-25 RX ADMIN — TESTOSTERONE CYPIONATE 150 MG: 200 INJECTION, SOLUTION INTRAMUSCULAR at 10:07

## 2023-08-08 ENCOUNTER — CLINICAL SUPPORT (OUTPATIENT)
Dept: UROLOGY | Facility: CLINIC | Age: 78
End: 2023-08-08
Payer: MEDICARE

## 2023-08-08 DIAGNOSIS — E29.1 HYPOGONADISM, MALE: Primary | ICD-10-CM

## 2023-08-08 PROCEDURE — 96372 THER/PROPH/DIAG INJ SC/IM: CPT | Mod: PBBFAC,PO

## 2023-08-08 PROCEDURE — 99212 OFFICE O/P EST SF 10 MIN: CPT | Mod: PBBFAC,PO

## 2023-08-08 PROCEDURE — 99999PBSHW PR PBB SHADOW TECHNICAL ONLY FILED TO HB: ICD-10-PCS | Mod: PBBFAC,,,

## 2023-08-08 PROCEDURE — 99999 PR PBB SHADOW E&M-EST. PATIENT-LVL II: CPT | Mod: PBBFAC,,,

## 2023-08-08 PROCEDURE — 99999 PR PBB SHADOW E&M-EST. PATIENT-LVL II: ICD-10-PCS | Mod: PBBFAC,,,

## 2023-08-08 PROCEDURE — 99999PBSHW PR PBB SHADOW TECHNICAL ONLY FILED TO HB: Mod: PBBFAC,,,

## 2023-08-08 RX ADMIN — TESTOSTERONE CYPIONATE 150 MG: 200 INJECTION, SOLUTION INTRAMUSCULAR at 01:08

## 2023-08-14 ENCOUNTER — PATIENT MESSAGE (OUTPATIENT)
Dept: ADMINISTRATIVE | Facility: HOSPITAL | Age: 78
End: 2023-08-14
Payer: MEDICARE

## 2023-08-15 ENCOUNTER — TELEPHONE (OUTPATIENT)
Dept: FAMILY MEDICINE | Facility: CLINIC | Age: 78
End: 2023-08-15
Payer: MEDICARE

## 2023-08-15 VITALS — SYSTOLIC BLOOD PRESSURE: 131 MMHG | DIASTOLIC BLOOD PRESSURE: 74 MMHG

## 2023-08-15 NOTE — TELEPHONE ENCOUNTER
FYI    Please see blood pressure reading from the patient in recent My Chart message.    Thank you,     Mouna Thapa LPN Care Coordinator  [facility]  Phone: [phone]  FAX: [phone]

## 2023-08-15 NOTE — TELEPHONE ENCOUNTER
Outreach to patient for hypertension management.     RE: Need to find out if patient is monitoring blood pressure at home.  If so, the most recent blood pressure is needed to update the chart.      Message sent to patient's portal.    Most recent blood pressure reading was 131/74 on 7/21/2023.

## 2023-08-16 ENCOUNTER — TELEPHONE (OUTPATIENT)
Dept: FAMILY MEDICINE | Facility: CLINIC | Age: 78
End: 2023-08-16
Payer: MEDICARE

## 2023-08-16 VITALS — DIASTOLIC BLOOD PRESSURE: 74 MMHG | SYSTOLIC BLOOD PRESSURE: 131 MMHG

## 2023-08-22 ENCOUNTER — CLINICAL SUPPORT (OUTPATIENT)
Dept: UROLOGY | Facility: CLINIC | Age: 78
End: 2023-08-22
Payer: MEDICARE

## 2023-08-22 DIAGNOSIS — E29.1 HYPOGONADISM, MALE: Primary | ICD-10-CM

## 2023-08-22 PROCEDURE — 99999PBSHW PR PBB SHADOW TECHNICAL ONLY FILED TO HB: Mod: PBBFAC,,,

## 2023-08-22 PROCEDURE — 99999PBSHW PR PBB SHADOW TECHNICAL ONLY FILED TO HB: ICD-10-PCS | Mod: PBBFAC,,,

## 2023-08-22 PROCEDURE — 96372 THER/PROPH/DIAG INJ SC/IM: CPT | Mod: PBBFAC,PO

## 2023-08-22 RX ADMIN — TESTOSTERONE CYPIONATE 150 MG: 200 INJECTION, SOLUTION INTRAMUSCULAR at 02:08

## 2023-09-01 ENCOUNTER — LAB VISIT (OUTPATIENT)
Dept: LAB | Facility: HOSPITAL | Age: 78
End: 2023-09-01
Attending: NURSE PRACTITIONER
Payer: MEDICARE

## 2023-09-01 DIAGNOSIS — R17 ELEVATED BILIRUBIN: ICD-10-CM

## 2023-09-01 DIAGNOSIS — I10 ESSENTIAL HYPERTENSION, BENIGN: ICD-10-CM

## 2023-09-01 DIAGNOSIS — N18.31 CHRONIC KIDNEY DISEASE, STAGE 3A: ICD-10-CM

## 2023-09-01 LAB
ALBUMIN SERPL BCP-MCNC: 4 G/DL (ref 3.5–5.2)
ALP SERPL-CCNC: 50 U/L (ref 55–135)
ALT SERPL W/O P-5'-P-CCNC: <5 U/L (ref 10–44)
ANION GAP SERPL CALC-SCNC: 13 MMOL/L (ref 8–16)
AST SERPL-CCNC: 23 U/L (ref 10–40)
BASOPHILS # BLD AUTO: 0.06 K/UL (ref 0–0.2)
BASOPHILS NFR BLD: 0.8 % (ref 0–1.9)
BILIRUB SERPL-MCNC: 1.4 MG/DL (ref 0.1–1)
BUN SERPL-MCNC: 21 MG/DL (ref 8–23)
CALCIUM SERPL-MCNC: 9.3 MG/DL (ref 8.7–10.5)
CHLORIDE SERPL-SCNC: 107 MMOL/L (ref 95–110)
CO2 SERPL-SCNC: 23 MMOL/L (ref 23–29)
CREAT SERPL-MCNC: 1.1 MG/DL (ref 0.5–1.4)
DIFFERENTIAL METHOD: ABNORMAL
EOSINOPHIL # BLD AUTO: 0.1 K/UL (ref 0–0.5)
EOSINOPHIL NFR BLD: 0.9 % (ref 0–8)
ERYTHROCYTE [DISTWIDTH] IN BLOOD BY AUTOMATED COUNT: 13.9 % (ref 11.5–14.5)
EST. GFR  (NO RACE VARIABLE): >60 ML/MIN/1.73 M^2
GLUCOSE SERPL-MCNC: 88 MG/DL (ref 70–110)
HAV IGM SERPL QL IA: NORMAL
HBV CORE IGM SERPL QL IA: NORMAL
HBV SURFACE AG SERPL QL IA: NORMAL
HCT VFR BLD AUTO: 49.9 % (ref 40–54)
HCV AB SERPL QL IA: NORMAL
HGB BLD-MCNC: 16.4 G/DL (ref 14–18)
IMM GRANULOCYTES # BLD AUTO: 0.03 K/UL (ref 0–0.04)
IMM GRANULOCYTES NFR BLD AUTO: 0.4 % (ref 0–0.5)
LYMPHOCYTES # BLD AUTO: 1.2 K/UL (ref 1–4.8)
LYMPHOCYTES NFR BLD: 16 % (ref 18–48)
MCH RBC QN AUTO: 29.6 PG (ref 27–31)
MCHC RBC AUTO-ENTMCNC: 32.9 G/DL (ref 32–36)
MCV RBC AUTO: 90 FL (ref 82–98)
MONOCYTES # BLD AUTO: 1 K/UL (ref 0.3–1)
MONOCYTES NFR BLD: 13 % (ref 4–15)
NEUTROPHILS # BLD AUTO: 5.1 K/UL (ref 1.8–7.7)
NEUTROPHILS NFR BLD: 68.9 % (ref 38–73)
NRBC BLD-RTO: 0 /100 WBC
PLATELET # BLD AUTO: 238 K/UL (ref 150–450)
PMV BLD AUTO: 10.8 FL (ref 9.2–12.9)
POTASSIUM SERPL-SCNC: 4.7 MMOL/L (ref 3.5–5.1)
PROT SERPL-MCNC: 7.2 G/DL (ref 6–8.4)
RBC # BLD AUTO: 5.54 M/UL (ref 4.6–6.2)
SODIUM SERPL-SCNC: 143 MMOL/L (ref 136–145)
TSH SERPL DL<=0.005 MIU/L-ACNC: 1.71 UIU/ML (ref 0.4–4)
WBC # BLD AUTO: 7.39 K/UL (ref 3.9–12.7)

## 2023-09-01 PROCEDURE — 80053 COMPREHEN METABOLIC PANEL: CPT | Performed by: NURSE PRACTITIONER

## 2023-09-01 PROCEDURE — 80074 ACUTE HEPATITIS PANEL: CPT | Performed by: NURSE PRACTITIONER

## 2023-09-01 PROCEDURE — 36415 COLL VENOUS BLD VENIPUNCTURE: CPT | Mod: PO | Performed by: NURSE PRACTITIONER

## 2023-09-01 PROCEDURE — 84443 ASSAY THYROID STIM HORMONE: CPT | Performed by: NURSE PRACTITIONER

## 2023-09-01 PROCEDURE — 85025 COMPLETE CBC W/AUTO DIFF WBC: CPT | Performed by: NURSE PRACTITIONER

## 2023-09-05 ENCOUNTER — CLINICAL SUPPORT (OUTPATIENT)
Dept: UROLOGY | Facility: CLINIC | Age: 78
End: 2023-09-05
Payer: MEDICARE

## 2023-09-05 ENCOUNTER — PATIENT MESSAGE (OUTPATIENT)
Dept: UROLOGY | Facility: CLINIC | Age: 78
End: 2023-09-05

## 2023-09-05 DIAGNOSIS — E29.1 HYPOGONADISM, MALE: Primary | ICD-10-CM

## 2023-09-05 PROCEDURE — 99999 PR PBB SHADOW E&M-EST. PATIENT-LVL II: CPT | Mod: PBBFAC,,,

## 2023-09-05 PROCEDURE — 99999 PR PBB SHADOW E&M-EST. PATIENT-LVL II: ICD-10-PCS | Mod: PBBFAC,,,

## 2023-09-05 PROCEDURE — 96372 THER/PROPH/DIAG INJ SC/IM: CPT | Mod: PBBFAC,PO

## 2023-09-05 PROCEDURE — 99999PBSHW PR PBB SHADOW TECHNICAL ONLY FILED TO HB: ICD-10-PCS | Mod: PBBFAC,,,

## 2023-09-05 PROCEDURE — 99999PBSHW PR PBB SHADOW TECHNICAL ONLY FILED TO HB: Mod: PBBFAC,,,

## 2023-09-05 PROCEDURE — 99212 OFFICE O/P EST SF 10 MIN: CPT | Mod: PBBFAC,PO

## 2023-09-05 RX ADMIN — TESTOSTERONE CYPIONATE 150 MG: 200 INJECTION, SOLUTION INTRAMUSCULAR at 11:09

## 2023-09-08 ENCOUNTER — HOSPITAL ENCOUNTER (OUTPATIENT)
Dept: RADIOLOGY | Facility: HOSPITAL | Age: 78
Discharge: HOME | End: 2023-09-08
Attending: NURSE PRACTITIONER
Payer: MEDICARE

## 2023-09-08 DIAGNOSIS — Z13.6 SCREENING FOR AAA (ABDOMINAL AORTIC ANEURYSM): ICD-10-CM

## 2023-09-08 DIAGNOSIS — R17 ELEVATED BILIRUBIN: ICD-10-CM

## 2023-09-08 PROCEDURE — 76700 US EXAM ABDOM COMPLETE: CPT | Mod: 26,,, | Performed by: RADIOLOGY

## 2023-09-08 PROCEDURE — 76700 US EXAM ABDOM COMPLETE: CPT | Mod: TC,PO

## 2023-09-08 PROCEDURE — 76700 US ABDOMEN COMPLETE: ICD-10-PCS | Mod: 26,,, | Performed by: RADIOLOGY

## 2023-09-19 ENCOUNTER — PATIENT MESSAGE (OUTPATIENT)
Dept: UROLOGY | Facility: CLINIC | Age: 78
End: 2023-09-19

## 2023-09-26 ENCOUNTER — CLINICAL SUPPORT (OUTPATIENT)
Dept: UROLOGY | Facility: CLINIC | Age: 78
End: 2023-09-26
Payer: MEDICARE

## 2023-09-26 DIAGNOSIS — E29.1 HYPOGONADISM, MALE: Primary | ICD-10-CM

## 2023-09-26 PROCEDURE — 99999 PR PBB SHADOW E&M-EST. PATIENT-LVL II: ICD-10-PCS | Mod: PBBFAC,,,

## 2023-09-26 PROCEDURE — 99212 OFFICE O/P EST SF 10 MIN: CPT | Mod: PBBFAC,PO

## 2023-09-26 PROCEDURE — 99999 PR PBB SHADOW E&M-EST. PATIENT-LVL II: CPT | Mod: PBBFAC,,,

## 2023-09-26 RX ORDER — TESTOSTERONE CYPIONATE 200 MG/ML
150 INJECTION, SOLUTION INTRAMUSCULAR
Status: SHIPPED | OUTPATIENT
Start: 2023-09-26 | End: 2023-12-19

## 2023-09-29 ENCOUNTER — LAB VISIT (OUTPATIENT)
Dept: LAB | Facility: HOSPITAL | Age: 78
End: 2023-09-29
Attending: UROLOGY
Payer: MEDICARE

## 2023-09-29 DIAGNOSIS — E29.1 HYPOGONADISM, MALE: ICD-10-CM

## 2023-09-29 LAB
BASOPHILS # BLD AUTO: 0.07 K/UL (ref 0–0.2)
BASOPHILS NFR BLD: 0.9 % (ref 0–1.9)
DIFFERENTIAL METHOD: ABNORMAL
EOSINOPHIL # BLD AUTO: 0 K/UL (ref 0–0.5)
EOSINOPHIL NFR BLD: 0.4 % (ref 0–8)
ERYTHROCYTE [DISTWIDTH] IN BLOOD BY AUTOMATED COUNT: 13.2 % (ref 11.5–14.5)
HCT VFR BLD AUTO: 44.2 % (ref 40–54)
HCT VFR BLD AUTO: 44.2 % (ref 40–54)
HGB BLD-MCNC: 15.3 G/DL (ref 14–18)
HGB BLD-MCNC: 15.3 G/DL (ref 14–18)
IMM GRANULOCYTES # BLD AUTO: 0.02 K/UL (ref 0–0.04)
IMM GRANULOCYTES NFR BLD AUTO: 0.3 % (ref 0–0.5)
LYMPHOCYTES # BLD AUTO: 1 K/UL (ref 1–4.8)
LYMPHOCYTES NFR BLD: 12.8 % (ref 18–48)
MCH RBC QN AUTO: 29.7 PG (ref 27–31)
MCHC RBC AUTO-ENTMCNC: 34.6 G/DL (ref 32–36)
MCV RBC AUTO: 86 FL (ref 82–98)
MONOCYTES # BLD AUTO: 0.8 K/UL (ref 0.3–1)
MONOCYTES NFR BLD: 9.8 % (ref 4–15)
NEUTROPHILS # BLD AUTO: 6 K/UL (ref 1.8–7.7)
NEUTROPHILS NFR BLD: 75.8 % (ref 38–73)
NRBC BLD-RTO: 0 /100 WBC
PLATELET # BLD AUTO: 234 K/UL (ref 150–450)
PMV BLD AUTO: 10.6 FL (ref 9.2–12.9)
RBC # BLD AUTO: 5.15 M/UL (ref 4.6–6.2)
TESTOST SERPL-MCNC: 1163 NG/DL (ref 304–1227)
WBC # BLD AUTO: 7.92 K/UL (ref 3.9–12.7)

## 2023-09-29 PROCEDURE — 85025 COMPLETE CBC W/AUTO DIFF WBC: CPT | Performed by: UROLOGY

## 2023-09-29 PROCEDURE — 84403 ASSAY OF TOTAL TESTOSTERONE: CPT | Performed by: UROLOGY

## 2023-09-29 PROCEDURE — 36415 COLL VENOUS BLD VENIPUNCTURE: CPT | Mod: PO | Performed by: UROLOGY

## 2023-10-23 ENCOUNTER — OFFICE VISIT (OUTPATIENT)
Dept: UROLOGY | Facility: CLINIC | Age: 78
End: 2023-10-23
Payer: MEDICARE

## 2023-10-23 ENCOUNTER — LAB VISIT (OUTPATIENT)
Dept: LAB | Facility: HOSPITAL | Age: 78
End: 2023-10-23
Attending: UROLOGY
Payer: MEDICARE

## 2023-10-23 VITALS — BODY MASS INDEX: 24.66 KG/M2 | HEIGHT: 66 IN | WEIGHT: 153.44 LBS

## 2023-10-23 DIAGNOSIS — Z12.5 SCREENING FOR PROSTATE CANCER: ICD-10-CM

## 2023-10-23 DIAGNOSIS — R79.89 LOW TESTOSTERONE LEVEL IN MALE: Primary | ICD-10-CM

## 2023-10-23 PROCEDURE — 99999 PR PBB SHADOW E&M-EST. PATIENT-LVL III: ICD-10-PCS | Mod: PBBFAC,,, | Performed by: UROLOGY

## 2023-10-23 PROCEDURE — 99999 PR PBB SHADOW E&M-EST. PATIENT-LVL III: CPT | Mod: PBBFAC,,, | Performed by: UROLOGY

## 2023-10-23 PROCEDURE — 99214 OFFICE O/P EST MOD 30 MIN: CPT | Mod: S$PBB,,, | Performed by: UROLOGY

## 2023-10-23 PROCEDURE — 99999PBSHW PR PBB SHADOW TECHNICAL ONLY FILED TO HB: ICD-10-PCS | Mod: PBBFAC,,,

## 2023-10-23 PROCEDURE — 99213 OFFICE O/P EST LOW 20 MIN: CPT | Mod: PBBFAC,PO | Performed by: UROLOGY

## 2023-10-23 PROCEDURE — 99999PBSHW PR PBB SHADOW TECHNICAL ONLY FILED TO HB: Mod: PBBFAC,,,

## 2023-10-23 PROCEDURE — 96372 THER/PROPH/DIAG INJ SC/IM: CPT | Mod: PBBFAC,PO

## 2023-10-23 PROCEDURE — 36415 COLL VENOUS BLD VENIPUNCTURE: CPT | Mod: PO | Performed by: UROLOGY

## 2023-10-23 PROCEDURE — 84153 ASSAY OF PSA TOTAL: CPT | Performed by: UROLOGY

## 2023-10-23 PROCEDURE — 99214 PR OFFICE/OUTPT VISIT, EST, LEVL IV, 30-39 MIN: ICD-10-PCS | Mod: S$PBB,,, | Performed by: UROLOGY

## 2023-10-23 RX ORDER — RASAGILINE 1 MG/1
1 TABLET ORAL
COMMUNITY
Start: 2023-09-19

## 2023-10-23 RX ORDER — HYDROCHLOROTHIAZIDE 25 MG/1
25 TABLET ORAL
COMMUNITY
Start: 2023-05-26 | End: 2023-10-24

## 2023-10-23 RX ADMIN — TESTOSTERONE CYPIONATE 150 MG: 200 INJECTION, SOLUTION INTRAMUSCULAR at 11:10

## 2023-10-23 NOTE — PROGRESS NOTES
Subjective:       Patient ID: Mohit Schneider is a 78 y.o. male.    Chief Complaint: Annual Exam and Injections (testosterone)    HPI    78 year old with low testosterone.  He has done well on IM testosterone replacement.  He is here for follow up and lab check.   His current dose is 150 mg every 2 weeks.  He has increased energy level. His peak testosterone is 1163. H&H are normal.   PSA is pending.   He has no bothersome voiding symptoms.  He denies hematuria and dysuria.  Urine dipstick shows negative for all components except trace blood.     Component PSA Diagnostic   Latest Ref Rng & Units 0.00 - 4.00 ng/mL   9/23/2022 1.5   9/25/2021 1.4   10/2/2020 1.3      Component Testosterone, Total   Latest Ref Rng & Units 304 - 1227 ng/dL   9/29/2023 1163   9/23/2022 847   9/25/2021 820   10/2/2020 1195   9/7/2019 1102       Review of Systems   Constitutional:  Negative for fever.   Genitourinary:  Negative for dysuria and hematuria.       Objective:      Physical Exam  Vitals reviewed.   Constitutional:       Appearance: He is well-developed.   Pulmonary:      Effort: Pulmonary effort is normal.   Genitourinary:     Prostate: Enlarged (40g). Not tender and no nodules present.   Skin:     Findings: No rash.   Neurological:      Mental Status: He is alert and oriented to person, place, and time.         Assessment:       1. Low testosterone level in male    2. Screening for prostate cancer        Plan:       Low testosterone level in male    Screening for prostate cancer  -     PSA, Screening; Future; Expected date: 10/23/2023      You testosterone at current dose.  He gets injections here in the office.  Update PSA.  Annual follow-up.

## 2023-10-24 LAB — COMPLEXED PSA SERPL-MCNC: 1.2 NG/ML (ref 0–4)

## 2023-10-31 ENCOUNTER — TELEPHONE (OUTPATIENT)
Dept: GASTROENTEROLOGY | Facility: CLINIC | Age: 78
End: 2023-10-31
Payer: MEDICARE

## 2023-11-07 ENCOUNTER — PATIENT MESSAGE (OUTPATIENT)
Dept: UROLOGY | Facility: CLINIC | Age: 78
End: 2023-11-07
Payer: MEDICARE

## 2023-11-14 ENCOUNTER — CLINICAL SUPPORT (OUTPATIENT)
Dept: UROLOGY | Facility: CLINIC | Age: 78
End: 2023-11-14
Payer: MEDICARE

## 2023-11-14 DIAGNOSIS — R79.89 LOW TESTOSTERONE LEVEL IN MALE: Primary | ICD-10-CM

## 2023-11-14 PROCEDURE — 99212 OFFICE O/P EST SF 10 MIN: CPT | Mod: PBBFAC,PO

## 2023-11-14 PROCEDURE — 99999 PR PBB SHADOW E&M-EST. PATIENT-LVL II: ICD-10-PCS | Mod: PBBFAC,,,

## 2023-11-14 PROCEDURE — 99999 PR PBB SHADOW E&M-EST. PATIENT-LVL II: CPT | Mod: PBBFAC,,,

## 2023-11-14 PROCEDURE — 96372 THER/PROPH/DIAG INJ SC/IM: CPT | Mod: PBBFAC,PO

## 2023-11-14 PROCEDURE — 99999PBSHW PR PBB SHADOW TECHNICAL ONLY FILED TO HB: ICD-10-PCS | Mod: PBBFAC,,,

## 2023-11-14 PROCEDURE — 99999PBSHW PR PBB SHADOW TECHNICAL ONLY FILED TO HB: Mod: PBBFAC,,,

## 2023-11-14 RX ADMIN — TESTOSTERONE CYPIONATE 150 MG: 200 INJECTION, SOLUTION INTRAMUSCULAR at 10:11

## 2023-11-28 ENCOUNTER — CLINICAL SUPPORT (OUTPATIENT)
Dept: UROLOGY | Facility: CLINIC | Age: 78
End: 2023-11-28
Payer: MEDICARE

## 2023-11-28 DIAGNOSIS — E29.1 HYPOGONADISM, MALE: Primary | ICD-10-CM

## 2023-11-28 PROCEDURE — 99999PBSHW PR PBB SHADOW TECHNICAL ONLY FILED TO HB: ICD-10-PCS | Mod: PBBFAC,,,

## 2023-11-28 PROCEDURE — 96372 THER/PROPH/DIAG INJ SC/IM: CPT | Mod: PBBFAC,PO

## 2023-11-28 PROCEDURE — 99999 PR PBB SHADOW E&M-EST. PATIENT-LVL II: CPT | Mod: PBBFAC,,,

## 2023-11-28 PROCEDURE — 99212 OFFICE O/P EST SF 10 MIN: CPT | Mod: PBBFAC,PO

## 2023-11-28 PROCEDURE — 99999 PR PBB SHADOW E&M-EST. PATIENT-LVL II: ICD-10-PCS | Mod: PBBFAC,,,

## 2023-11-28 PROCEDURE — 99999PBSHW PR PBB SHADOW TECHNICAL ONLY FILED TO HB: Mod: PBBFAC,,,

## 2023-11-28 RX ADMIN — TESTOSTERONE CYPIONATE 150 MG: 200 INJECTION, SOLUTION INTRAMUSCULAR at 09:11

## 2023-11-28 NOTE — PROGRESS NOTES
testosterone injection 150mg given to patient, see patient MAR, patient tolerated well. Patient declined to wait 20 minutes post injection.

## 2023-12-11 ENCOUNTER — PATIENT MESSAGE (OUTPATIENT)
Dept: UROLOGY | Facility: CLINIC | Age: 78
End: 2023-12-11
Payer: MEDICARE

## 2023-12-16 ENCOUNTER — PATIENT MESSAGE (OUTPATIENT)
Dept: UROLOGY | Facility: CLINIC | Age: 78
End: 2023-12-16
Payer: MEDICARE

## 2023-12-21 PROBLEM — G20.B1 PARKINSON'S DISEASE WITH DYSKINESIA, UNSPECIFIED WHETHER MANIFESTATIONS FLUCTUATE: Status: ACTIVE | Noted: 2023-12-21

## 2023-12-26 ENCOUNTER — TELEPHONE (OUTPATIENT)
Dept: UROLOGY | Facility: CLINIC | Age: 78
End: 2023-12-26
Payer: MEDICARE

## 2023-12-26 NOTE — TELEPHONE ENCOUNTER
Lm on patient vm , nurse visit for 12/27/2023 will be cancelled as Testosterone has not come in yet . Patient will be offered option to have injection teaching to self administer IM Testosterone . Patient to be notified once Testosterone has come in .

## 2024-01-02 ENCOUNTER — LAB VISIT (OUTPATIENT)
Dept: LAB | Facility: HOSPITAL | Age: 79
End: 2024-01-02
Attending: NURSE PRACTITIONER
Payer: MEDICARE

## 2024-01-02 DIAGNOSIS — N18.31 CHRONIC KIDNEY DISEASE, STAGE 3A: ICD-10-CM

## 2024-01-02 DIAGNOSIS — I10 ESSENTIAL HYPERTENSION, BENIGN: ICD-10-CM

## 2024-01-02 DIAGNOSIS — E78.2 MIXED HYPERLIPIDEMIA: ICD-10-CM

## 2024-01-02 DIAGNOSIS — R17 ELEVATED BILIRUBIN: ICD-10-CM

## 2024-01-02 LAB
ALBUMIN SERPL BCP-MCNC: 3.8 G/DL (ref 3.5–5.2)
ALP SERPL-CCNC: 62 U/L (ref 55–135)
ALT SERPL W/O P-5'-P-CCNC: 6 U/L (ref 10–44)
ANION GAP SERPL CALC-SCNC: 12 MMOL/L (ref 8–16)
AST SERPL-CCNC: 19 U/L (ref 10–40)
BILIRUB SERPL-MCNC: 1.6 MG/DL (ref 0.1–1)
BUN SERPL-MCNC: 22 MG/DL (ref 8–23)
CALCIUM SERPL-MCNC: 9.3 MG/DL (ref 8.7–10.5)
CHLORIDE SERPL-SCNC: 108 MMOL/L (ref 95–110)
CHOLEST SERPL-MCNC: 150 MG/DL (ref 120–199)
CHOLEST/HDLC SERPL: 2 {RATIO} (ref 2–5)
CO2 SERPL-SCNC: 24 MMOL/L (ref 23–29)
CREAT SERPL-MCNC: 1 MG/DL (ref 0.5–1.4)
EST. GFR  (NO RACE VARIABLE): >60 ML/MIN/1.73 M^2
GLUCOSE SERPL-MCNC: 94 MG/DL (ref 70–110)
HDLC SERPL-MCNC: 75 MG/DL (ref 40–75)
HDLC SERPL: 50 % (ref 20–50)
LDLC SERPL CALC-MCNC: 63.6 MG/DL (ref 63–159)
NONHDLC SERPL-MCNC: 75 MG/DL
POTASSIUM SERPL-SCNC: 4.1 MMOL/L (ref 3.5–5.1)
PROT SERPL-MCNC: 7.2 G/DL (ref 6–8.4)
SODIUM SERPL-SCNC: 144 MMOL/L (ref 136–145)
TRIGL SERPL-MCNC: 57 MG/DL (ref 30–150)
TSH SERPL DL<=0.005 MIU/L-ACNC: 1.21 UIU/ML (ref 0.4–4)

## 2024-01-02 PROCEDURE — 80053 COMPREHEN METABOLIC PANEL: CPT | Performed by: NURSE PRACTITIONER

## 2024-01-02 PROCEDURE — 84443 ASSAY THYROID STIM HORMONE: CPT | Performed by: NURSE PRACTITIONER

## 2024-01-02 PROCEDURE — 36415 COLL VENOUS BLD VENIPUNCTURE: CPT | Mod: PO | Performed by: NURSE PRACTITIONER

## 2024-01-02 PROCEDURE — 80061 LIPID PANEL: CPT | Performed by: NURSE PRACTITIONER

## 2024-01-08 ENCOUNTER — PATIENT MESSAGE (OUTPATIENT)
Dept: ORTHOPEDICS | Facility: CLINIC | Age: 79
End: 2024-01-08

## 2024-01-08 ENCOUNTER — OFFICE VISIT (OUTPATIENT)
Dept: ORTHOPEDICS | Facility: CLINIC | Age: 79
End: 2024-01-08
Payer: MEDICARE

## 2024-01-08 ENCOUNTER — PATIENT MESSAGE (OUTPATIENT)
Dept: UROLOGY | Facility: CLINIC | Age: 79
End: 2024-01-08
Payer: MEDICARE

## 2024-01-08 ENCOUNTER — HOSPITAL ENCOUNTER (OUTPATIENT)
Dept: RADIOLOGY | Facility: HOSPITAL | Age: 79
Discharge: HOME | End: 2024-01-08
Attending: ORTHOPAEDIC SURGERY
Payer: MEDICARE

## 2024-01-08 DIAGNOSIS — M79.641 PAIN IN RIGHT HAND: ICD-10-CM

## 2024-01-08 DIAGNOSIS — L03.011 CELLULITIS OF FINGER OF RIGHT HAND: ICD-10-CM

## 2024-01-08 DIAGNOSIS — M79.641 PAIN IN RIGHT HAND: Primary | ICD-10-CM

## 2024-01-08 PROCEDURE — 73130 X-RAY EXAM OF HAND: CPT | Mod: 26,RT,, | Performed by: RADIOLOGY

## 2024-01-08 PROCEDURE — 73130 X-RAY EXAM OF HAND: CPT | Mod: TC,PO,RT

## 2024-01-08 PROCEDURE — 99203 OFFICE O/P NEW LOW 30 MIN: CPT | Mod: S$PBB,,, | Performed by: ORTHOPAEDIC SURGERY

## 2024-01-08 PROCEDURE — 99213 OFFICE O/P EST LOW 20 MIN: CPT | Mod: PBBFAC,PO | Performed by: ORTHOPAEDIC SURGERY

## 2024-01-08 PROCEDURE — 99999 PR PBB SHADOW E&M-EST. PATIENT-LVL III: CPT | Mod: PBBFAC,,, | Performed by: ORTHOPAEDIC SURGERY

## 2024-01-08 RX ORDER — SULFAMETHOXAZOLE AND TRIMETHOPRIM 800; 160 MG/1; MG/1
1 TABLET ORAL 2 TIMES DAILY
Qty: 20 TABLET | Refills: 0 | Status: SHIPPED | OUTPATIENT
Start: 2024-01-08 | End: 2024-01-18

## 2024-01-08 NOTE — PROGRESS NOTES
2024    Chief Complaint:  Chief Complaint   Patient presents with    Right Hand - Injury, Pain, Swelling       HPI:  Mohit Schneider is a 78 y.o. male, who presents to clinic today has a history of an injury to his right hand.  This occurred 5 days ago.  He had a fall in which he had a flexion type injury to the finger.  He noted have a split in the skin.  He did not immediately seek medical attention.  Has had increasing swelling and redness and therefore he presents to clinic today.    PMHX:  Past Medical History:   Diagnosis Date    Allergy     Arthritis     Hypertension     Sleep apnea     USES C-PAP       PSHX:  Past Surgical History:   Procedure Laterality Date    ARTHROPLASTY OF HIP BY ANTERIOR APPROACH Right 2/15/2021    Procedure: ARTHROPLASTY, HIP, ANTERIOR APPROACH;  Surgeon: Maynor Tolentino MD;  Location[facility];  Service: Orthopedics;  Laterality: Right;    CIRCUMCISION      EYE SURGERY Left     age 6    HERNIA REPAIR Left     TONSILLECTOMY         FMHX:  Family History   Problem Relation Age of Onset    Kidney disease Mother     Stroke Father     Hypertension Father        SOCHX:  Social History     Tobacco Use    Smoking status: Former     Current packs/day: 0.00     Types: Cigarettes     Quit date: 3/20/1985     Years since quittin.8    Smokeless tobacco: Never   Substance Use Topics    Alcohol use: Yes     Alcohol/week: 14.0 standard drinks of alcohol     Types: 14 Glasses of wine per week       ALLERGIES:  Penicillins    CURRENT MEDICATIONS:  Current Outpatient Medications on File Prior to Visit   Medication Sig Dispense Refill    amLODIPine (NORVASC) 5 MG tablet Take 2 tablets (10 mg total) by mouth every evening. 60 tablet 1    carbidopa-levodopa  mg (SINEMET)  mg per tablet Take 1 tablet by mouth 3 (three) times daily.      cetirizine (ZYRTEC) 5 MG tablet Take 5 mg by mouth once daily.      clindamycin (CLEOCIN) 300 MG capsule Take 1 capsule (300 mg total) by mouth On  call Procedure (dental work). (Patient not taking: Reported on 11/14/2023) 5 capsule 5    irbesartan (AVAPRO) 150 MG tablet Take 1 tablet (150 mg total) by mouth once daily. 90 tablet 3    meloxicam (MOBIC) 15 MG tablet TAKE 1 TABLET(15 MG) BY MOUTH EVERY DAY 30 tablet 11    methylPREDNISolone (MEDROL DOSEPACK) 4 mg tablet use as directed 21 tablet 0    multivitamin (THERAGRAN) per tablet Take 1 tablet by mouth once daily.      pantoprazole (PROTONIX) 40 MG tablet Take 1 tablet (40 mg total) by mouth every morning. 90 tablet 3    rasagiline (AZILECT) 1 mg Tab Take 1 mg by mouth.      rosuvastatin (CRESTOR) 5 MG tablet Take 5 mg by mouth every evening.       No current facility-administered medications on file prior to visit.       REVIEW OF SYSTEMS:  ROS    GENERAL PHYSICAL EXAM:   There were no vitals taken for this visit.   GEN: well developed, well nourished, no acute distress   HENT: Normocephalic, atraumatic   EYES: No discharge, conjunctiva normal   NECK: Supple, non-tender   PULM: No wheezing, no respiratory distress   CV: RRR   ABD: Soft, non-tender    ORTHO EXAM:   Examination of the right hand reveals that there is a laceration over the dorsum of the ring finger MCP joint.  There are surrounding edema and erythema.  There is a small area of fluctuance.  Palpation at that area revealed that there was some purulence expressed.  He does have a 10-15 degree extensor lag.  He does have sensation intact over the tip of the finger and capillary refill is less than 2 seconds    RADIOLOGY:   X-rays of the right hand were taken in clinic today is no fracture or dislocation noted    ASSESSMENT:   Right hand contusion/laceration with abscess    PLAN:  1. The abscess was decompressed in clinic today.  I will start him on oral antibiotics which he will take twice daily for the next 10 days     2.  He will follow up with me in 2 days for repeat evaluation     3. I did place a sterile dressing to the hand

## 2024-01-10 ENCOUNTER — OFFICE VISIT (OUTPATIENT)
Dept: ORTHOPEDICS | Facility: CLINIC | Age: 79
End: 2024-01-10
Payer: MEDICARE

## 2024-01-10 VITALS — WEIGHT: 150 LBS | HEIGHT: 66 IN | BODY MASS INDEX: 24.11 KG/M2

## 2024-01-10 DIAGNOSIS — L03.011 CELLULITIS OF FINGER OF RIGHT HAND: Primary | ICD-10-CM

## 2024-01-10 PROCEDURE — 99213 OFFICE O/P EST LOW 20 MIN: CPT | Mod: S$PBB,,, | Performed by: PHYSICIAN ASSISTANT

## 2024-01-10 PROCEDURE — 99213 OFFICE O/P EST LOW 20 MIN: CPT | Mod: PBBFAC,PO | Performed by: PHYSICIAN ASSISTANT

## 2024-01-10 PROCEDURE — 99999 PR PBB SHADOW E&M-EST. PATIENT-LVL III: CPT | Mod: PBBFAC,,, | Performed by: PHYSICIAN ASSISTANT

## 2024-01-10 NOTE — PROGRESS NOTES
1/10/2024    HPI:  Mohit Schneider is a 78 y.o. male, who presents to clinic today for continued evaluation of his right hand abscess status post I&D.  He is approximately 2 days status post I&D.  States he has been taking the antibiotics as instructed.  States the dressing he was instructed to keep dry, clean, and intact fell apart.  States he would place the dressing with Band-Aids.  Denies any acute injuries since his last visit.  Denies any other complaints at this time.    PMHX:  Past Medical History:   Diagnosis Date    Allergy     Arthritis     Hypertension     Sleep apnea     USES C-PAP       PSHX:  Past Surgical History:   Procedure Laterality Date    ARTHROPLASTY OF HIP BY ANTERIOR APPROACH Right 2/15/2021    Procedure: ARTHROPLASTY, HIP, ANTERIOR APPROACH;  Surgeon: Maynor Tolentino MD;  Location: [facility];  Service: Orthopedics;  Laterality: Right;    CIRCUMCISION      EYE SURGERY Left     age 6    HERNIA REPAIR Left     TONSILLECTOMY         FMHX:  Family History   Problem Relation Age of Onset    Kidney disease Mother     Stroke Father     Hypertension Father        SOCHX:  Social History     Tobacco Use    Smoking status: Former     Current packs/day: 0.00     Types: Cigarettes     Quit date: 3/20/1985     Years since quittin.8    Smokeless tobacco: Never   Substance Use Topics    Alcohol use: Yes     Alcohol/week: 14.0 standard drinks of alcohol     Types: 14 Glasses of wine per week       ALLERGIES:  Penicillins    CURRENT MEDICATIONS:  Current Outpatient Medications on File Prior to Visit   Medication Sig Dispense Refill    amLODIPine (NORVASC) 5 MG tablet Take 2 tablets (10 mg total) by mouth every evening. 60 tablet 1    carbidopa-levodopa  mg (SINEMET)  mg per tablet Take 1 tablet by mouth 3 (three) times daily.      cetirizine (ZYRTEC) 5 MG tablet Take 5 mg by mouth once daily.      clindamycin (CLEOCIN) 300 MG capsule Take 1 capsule (300 mg total) by mouth On call  "Procedure (dental work). (Patient not taking: Reported on 11/14/2023) 5 capsule 5    irbesartan (AVAPRO) 150 MG tablet Take 1 tablet (150 mg total) by mouth once daily. 90 tablet 3    meloxicam (MOBIC) 15 MG tablet TAKE 1 TABLET(15 MG) BY MOUTH EVERY DAY 30 tablet 11    methylPREDNISolone (MEDROL DOSEPACK) 4 mg tablet use as directed 21 tablet 0    multivitamin (THERAGRAN) per tablet Take 1 tablet by mouth once daily.      pantoprazole (PROTONIX) 40 MG tablet Take 1 tablet (40 mg total) by mouth every morning. 90 tablet 3    rasagiline (AZILECT) 1 mg Tab Take 1 mg by mouth.      rosuvastatin (CRESTOR) 5 MG tablet Take 5 mg by mouth every evening.      sulfamethoxazole-trimethoprim 800-160mg (BACTRIM DS) 800-160 mg Tab Take 1 tablet by mouth 2 (two) times daily. for 10 days 20 tablet 0     No current facility-administered medications on file prior to visit.       REVIEW OF SYSTEMS:  Review of Systems Complete; Negative, unless noted above.    GENERAL PHYSICAL EXAM:   Ht 5' 6" (1.676 m)   Wt 68 kg (150 lb)   BMI 24.21 kg/m²    GEN: well developed, well nourished, no acute distress   PULM: No wheezing, no respiratory distress   CV: RRR    ORTHO EXAM:   Examination of the right hand reveals the presence of an abscess over the dorsum of the 4th metacarpal with 2 separate I and D sites.  Presence of mild purulent drainage from the I and D sites.  Presence of mild serous drainage after the purulent drainage.  Presence of erythema around the abscess I and D sites.  This does appear limited to around the I and D sites, and does not extend distally down the finger or proximally to the wrist.  Sensation is grossly intact of the right ring finger.  Capillary refill less than 2 seconds.    RADIOLOGY:   None.    ASSESSMENT:   Right hand abscess status post I&D    PLAN:  1. I discussed with Mohit Schneider that the best course of action this time is to thoroughly clean his I and D sites and place him in a soft bulky " dressing.  We did discuss the importance of keeping the dressing dry, clean, and intact until seen again in clinic.  We did discuss do not take the dressing off even if it feels like it is falling apart.  We did discuss the importance of continuing to take his antibiotics.  He verbally agreed with the treatment plan     2. His right hand was thoroughly cleaned in clinic today with chlorhexidine.  His right hand was then placed in a soft bulky dressing using 4 x 4 gauze, cast padding, and an Ace wrap.      3. I would like him follow-up in clinic in 2 days for repeat evaluation.  He was instructed to contact clinic for any problems or concerns in the interim.

## 2024-01-12 ENCOUNTER — OFFICE VISIT (OUTPATIENT)
Dept: ORTHOPEDICS | Facility: CLINIC | Age: 79
End: 2024-01-12
Payer: MEDICARE

## 2024-01-12 VITALS — WEIGHT: 150 LBS | BODY MASS INDEX: 24.11 KG/M2 | HEIGHT: 66 IN

## 2024-01-12 DIAGNOSIS — L03.011 CELLULITIS OF FINGER OF RIGHT HAND: Primary | ICD-10-CM

## 2024-01-12 PROCEDURE — 99999 PR PBB SHADOW E&M-EST. PATIENT-LVL III: CPT | Mod: PBBFAC,,, | Performed by: PHYSICIAN ASSISTANT

## 2024-01-12 PROCEDURE — 99213 OFFICE O/P EST LOW 20 MIN: CPT | Mod: PBBFAC,PO | Performed by: PHYSICIAN ASSISTANT

## 2024-01-12 PROCEDURE — 99213 OFFICE O/P EST LOW 20 MIN: CPT | Mod: S$PBB,,, | Performed by: PHYSICIAN ASSISTANT

## 2024-01-12 NOTE — PROGRESS NOTES
2024    HPI:  Mohit Schneider is a 78 y.o. male, who presents to clinic today for continued evaluation of his right hand abscess with associated cellulitis status post I&D.  States he is kept the dressing dry, clean, intact as instructed.  States he is taken his antibiotics as instructed.  Denies any other complaints at this time.    PMHX:  Past Medical History:   Diagnosis Date    Allergy     Arthritis     Hypertension     Sleep apnea     USES C-PAP       PSHX:  Past Surgical History:   Procedure Laterality Date    ARTHROPLASTY OF HIP BY ANTERIOR APPROACH Right 2/15/2021    Procedure: ARTHROPLASTY, HIP, ANTERIOR APPROACH;  Surgeon: Maynor Tolentino MD;  Location: [facility];  Service: Orthopedics;  Laterality: Right;    CIRCUMCISION      EYE SURGERY Left     age 6    HERNIA REPAIR Left     TONSILLECTOMY         FMHX:  Family History   Problem Relation Age of Onset    Kidney disease Mother     Stroke Father     Hypertension Father        SOCHX:  Social History     Tobacco Use    Smoking status: Former     Current packs/day: 0.00     Types: Cigarettes     Quit date: 3/20/1985     Years since quittin.8    Smokeless tobacco: Never   Substance Use Topics    Alcohol use: Yes     Alcohol/week: 14.0 standard drinks of alcohol     Types: 14 Glasses of wine per week       ALLERGIES:  Penicillins    CURRENT MEDICATIONS:  Current Outpatient Medications on File Prior to Visit   Medication Sig Dispense Refill    amLODIPine (NORVASC) 5 MG tablet Take 2 tablets (10 mg total) by mouth every evening. 60 tablet 1    carbidopa-levodopa  mg (SINEMET)  mg per tablet Take 1 tablet by mouth 3 (three) times daily.      cetirizine (ZYRTEC) 5 MG tablet Take 5 mg by mouth once daily.      clindamycin (CLEOCIN) 300 MG capsule Take 1 capsule (300 mg total) by mouth On call Procedure (dental work). (Patient not taking: Reported on 2023) 5 capsule 5    irbesartan (AVAPRO) 150 MG tablet Take 1 tablet (150 mg total) by  "mouth once daily. 90 tablet 3    meloxicam (MOBIC) 15 MG tablet TAKE 1 TABLET(15 MG) BY MOUTH EVERY DAY 30 tablet 11    multivitamin (THERAGRAN) per tablet Take 1 tablet by mouth once daily.      pantoprazole (PROTONIX) 40 MG tablet Take 1 tablet (40 mg total) by mouth every morning. 90 tablet 3    rasagiline (AZILECT) 1 mg Tab Take 1 mg by mouth.      rosuvastatin (CRESTOR) 5 MG tablet Take 5 mg by mouth every evening.      sulfamethoxazole-trimethoprim 800-160mg (BACTRIM DS) 800-160 mg Tab Take 1 tablet by mouth 2 (two) times daily. for 10 days 20 tablet 0     No current facility-administered medications on file prior to visit.       REVIEW OF SYSTEMS:  Review of Systems Complete; Negative, unless noted above.    GENERAL PHYSICAL EXAM:   Ht 5' 6" (1.676 m)   Wt 68 kg (150 lb)   BMI 24.21 kg/m²    GEN: well developed, well nourished, no acute distress   PULM: No wheezing, no respiratory distress   CV: RRR    ORTHO EXAM:   Examination of the right hand reveals to appropriately healing I&D sites, with presence of minimal surrounding erythema.  Presence of hemo sanguinous drainage from the sites.  No signs of gross purulence/worsening infection.  Presence of extensor lag of the right ring finger.  Sensation is grossly intact in the radial, ulnar, median nerve distributions.  Capillary refill less than 2 seconds.    RADIOLOGY:   None.    ASSESSMENT:   Right hand abscess with associated cellulitis status post I&D    PLAN:  1. I discussed with Mohit Schneider that he is progressing appropriately in the treatment course.  We discussed the best course of action this time is to thoroughly cleaned his I and D sites in clinic today and place a new soft bulky dressing.  We did discuss the importance of continuing to take his antibiotics as instructed.  He verbally agreed with the treatment plan     2. His right hand was thoroughly cleaned in clinic today with chlorhexidine.  His right hand was then dressed with 4 x 4 " gauze, cast padding, and an Ace wrap.      3. I would like him follow-up in clinic in 4 days for repeat evaluation.  He was instructed to contact the clinic for any problems or concerns in the interim.

## 2024-01-16 ENCOUNTER — OFFICE VISIT (OUTPATIENT)
Dept: ORTHOPEDICS | Facility: CLINIC | Age: 79
End: 2024-01-16
Payer: MEDICARE

## 2024-01-16 VITALS — WEIGHT: 150 LBS | BODY MASS INDEX: 24.11 KG/M2 | HEIGHT: 66 IN

## 2024-01-16 DIAGNOSIS — L03.011 CELLULITIS OF FINGER OF RIGHT HAND: Primary | ICD-10-CM

## 2024-01-16 PROCEDURE — 99213 OFFICE O/P EST LOW 20 MIN: CPT | Mod: S$PBB,,, | Performed by: PHYSICIAN ASSISTANT

## 2024-01-16 PROCEDURE — 99999 PR PBB SHADOW E&M-EST. PATIENT-LVL III: CPT | Mod: PBBFAC,,, | Performed by: PHYSICIAN ASSISTANT

## 2024-01-16 PROCEDURE — 99213 OFFICE O/P EST LOW 20 MIN: CPT | Mod: PBBFAC,PO | Performed by: PHYSICIAN ASSISTANT

## 2024-01-22 ENCOUNTER — OFFICE VISIT (OUTPATIENT)
Dept: ORTHOPEDICS | Facility: CLINIC | Age: 79
End: 2024-01-22
Payer: MEDICARE

## 2024-01-22 DIAGNOSIS — L03.011 CELLULITIS OF FINGER OF RIGHT HAND: Primary | ICD-10-CM

## 2024-01-22 PROCEDURE — 99213 OFFICE O/P EST LOW 20 MIN: CPT | Mod: S$PBB,,, | Performed by: PHYSICIAN ASSISTANT

## 2024-01-22 PROCEDURE — 99999 PR PBB SHADOW E&M-EST. PATIENT-LVL III: CPT | Mod: PBBFAC,,, | Performed by: PHYSICIAN ASSISTANT

## 2024-01-22 PROCEDURE — 99213 OFFICE O/P EST LOW 20 MIN: CPT | Mod: PBBFAC,PO | Performed by: PHYSICIAN ASSISTANT

## 2024-01-22 NOTE — PROGRESS NOTES
2024    HPI:  Mohit Schneider is a 78 y.o. male, who presents to clinic today for continued evaluation of his right hand cellulitis with associated abscess status post I&D.  States he is perform the daily wound washing and dressing changes as instructed.  States overall he is doing well.  Denies any acute injuries since last visit.  Denies any other complaints at this time.    PMHX:  Past Medical History:   Diagnosis Date    Allergy     Arthritis     Hypertension     Sleep apnea     USES C-PAP       PSHX:  Past Surgical History:   Procedure Laterality Date    ARTHROPLASTY OF HIP BY ANTERIOR APPROACH Right 2/15/2021    Procedure: ARTHROPLASTY, HIP, ANTERIOR APPROACH;  Surgeon: Maynor Tolentino MD;  Location: [facility];  Service: Orthopedics;  Laterality: Right;    CIRCUMCISION      EYE SURGERY Left     age 6    HERNIA REPAIR Left     TONSILLECTOMY         FMHX:  Family History   Problem Relation Age of Onset    Kidney disease Mother     Stroke Father     Hypertension Father        SOCHX:  Social History     Tobacco Use    Smoking status: Former     Current packs/day: 0.00     Types: Cigarettes     Quit date: 3/20/1985     Years since quittin.8    Smokeless tobacco: Never   Substance Use Topics    Alcohol use: Yes     Alcohol/week: 14.0 standard drinks of alcohol     Types: 14 Glasses of wine per week       ALLERGIES:  Penicillins    CURRENT MEDICATIONS:  Current Outpatient Medications on File Prior to Visit   Medication Sig Dispense Refill    amLODIPine (NORVASC) 5 MG tablet Take 2 tablets (10 mg total) by mouth every evening. 60 tablet 1    carbidopa-levodopa  mg (SINEMET)  mg per tablet Take 1 tablet by mouth 3 (three) times daily.      cetirizine (ZYRTEC) 5 MG tablet Take 5 mg by mouth once daily.      clindamycin (CLEOCIN) 300 MG capsule Take 1 capsule (300 mg total) by mouth On call Procedure (dental work). 5 capsule 5    irbesartan (AVAPRO) 150 MG tablet Take 1 tablet (150 mg total) by  mouth once daily. 90 tablet 3    meloxicam (MOBIC) 15 MG tablet TAKE 1 TABLET(15 MG) BY MOUTH EVERY DAY 30 tablet 11    multivitamin (THERAGRAN) per tablet Take 1 tablet by mouth once daily.      pantoprazole (PROTONIX) 40 MG tablet Take 1 tablet (40 mg total) by mouth every morning. 90 tablet 3    rasagiline (AZILECT) 1 mg Tab Take 1 mg by mouth.      rosuvastatin (CRESTOR) 5 MG tablet Take 5 mg by mouth every evening.       No current facility-administered medications on file prior to visit.       REVIEW OF SYSTEMS:  Review of Systems Complete; Negative, unless noted above.    GENERAL PHYSICAL EXAM:   There were no vitals taken for this visit.   GEN: well developed, well nourished, no acute distress   PULM: No wheezing, no respiratory distress   CV: RRR    ORTHO EXAM:   Examination of the right hand reveals appropriately healing I&D sites with the appropriate eschar formation.  Presence of minimal edema.  No erythema, ecchymosis, or skin breakdown noted.  Able make composite fist and extend fingers with extensor lag of the right ring finger.  Sensation is grossly intact in the radial, ulnar, median nerve distributions.  Capillary refill less than 2 seconds.    RADIOLOGY:   None.    ASSESSMENT:   Right hand abscess status post I&D, questionable extensor tendon rupture of the right ring finger    PLAN:  1. I discussed with Mohit Schneider that he is progressing appropriately in the treatment course.  We discussed the best course of action this time is to continue with the daily wound washing, but he may leave the hand open to the air.  We did discuss the importance of covering the hand for any activities.  He verbally agreed with the treatment plan     2. His right hand was thoroughly cleaned in clinic today with chlorhexidine.  His right hand was then dressed with an Ace wrap.      3. I would like to have him follow up in clinic with Dr. Jones in 1-2 weeks for repeat evaluation.  He was instructed to  contact clinic for any problems or concerns in the interim.

## 2024-01-23 ENCOUNTER — CLINICAL SUPPORT (OUTPATIENT)
Dept: UROLOGY | Facility: CLINIC | Age: 79
End: 2024-01-23
Payer: MEDICARE

## 2024-01-23 DIAGNOSIS — E29.1 HYPOGONADISM, MALE: Primary | ICD-10-CM

## 2024-01-23 PROCEDURE — 96372 THER/PROPH/DIAG INJ SC/IM: CPT | Mod: PBBFAC,PO

## 2024-01-23 PROCEDURE — 99999PBSHW PR PBB SHADOW TECHNICAL ONLY FILED TO HB: Mod: PBBFAC,,,

## 2024-01-23 PROCEDURE — 99999 PR PBB SHADOW E&M-EST. PATIENT-LVL II: CPT | Mod: PBBFAC,,,

## 2024-01-23 RX ORDER — TESTOSTERONE CYPIONATE 200 MG/ML
150 INJECTION, SOLUTION INTRAMUSCULAR
Status: COMPLETED | OUTPATIENT
Start: 2024-01-23 | End: 2024-04-02

## 2024-01-23 RX ADMIN — TESTOSTERONE CYPIONATE 150 MG: 200 INJECTION INTRAMUSCULAR at 09:01

## 2024-02-06 ENCOUNTER — CLINICAL SUPPORT (OUTPATIENT)
Dept: UROLOGY | Facility: CLINIC | Age: 79
End: 2024-02-06
Payer: MEDICARE

## 2024-02-06 DIAGNOSIS — E29.1 HYPOGONADISM, MALE: Primary | ICD-10-CM

## 2024-02-06 PROCEDURE — 99212 OFFICE O/P EST SF 10 MIN: CPT | Mod: PBBFAC,PO,25

## 2024-02-06 PROCEDURE — 99999PBSHW PR PBB SHADOW TECHNICAL ONLY FILED TO HB: Mod: PBBFAC,,,

## 2024-02-06 PROCEDURE — 99999 PR PBB SHADOW E&M-EST. PATIENT-LVL II: CPT | Mod: PBBFAC,,,

## 2024-02-06 PROCEDURE — 96372 THER/PROPH/DIAG INJ SC/IM: CPT | Mod: PBBFAC,PO

## 2024-02-06 RX ADMIN — TESTOSTERONE CYPIONATE 150 MG: 200 INJECTION INTRAMUSCULAR at 09:02

## 2024-02-07 ENCOUNTER — OFFICE VISIT (OUTPATIENT)
Dept: ORTHOPEDICS | Facility: CLINIC | Age: 79
End: 2024-02-07
Payer: MEDICARE

## 2024-02-07 VITALS — HEIGHT: 66 IN | WEIGHT: 150 LBS | BODY MASS INDEX: 24.11 KG/M2

## 2024-02-07 DIAGNOSIS — L03.011 CELLULITIS OF FINGER OF RIGHT HAND: Primary | ICD-10-CM

## 2024-02-07 PROCEDURE — 99213 OFFICE O/P EST LOW 20 MIN: CPT | Mod: S$PBB,,, | Performed by: ORTHOPAEDIC SURGERY

## 2024-02-07 PROCEDURE — 99999 PR PBB SHADOW E&M-EST. PATIENT-LVL III: CPT | Mod: PBBFAC,,, | Performed by: ORTHOPAEDIC SURGERY

## 2024-02-07 PROCEDURE — 99213 OFFICE O/P EST LOW 20 MIN: CPT | Mod: PBBFAC,PO | Performed by: ORTHOPAEDIC SURGERY

## 2024-02-07 NOTE — PROGRESS NOTES
Mr Schneider returns to clinic today.  Has a history of abscess cellulitis overlying the dorsum of the right ring finger.  This occurred after had a fall in which had a split in the skin.  He states that he was overall doing better that the infection seems to have cleared.  He is still having some difficulty with extending the finger.      Physical exam: Examination of the right hand reveals that there is a well-healed wound overlying the dorsum of the ring finger.  Palpation over that area does not produce tenderness.  There is no increased warmth.  There is no significance erythema.  Flexion of the fingers reveals that he can touch his distal palmar crease.  He was able to actively extend but he does have a 5-10 degree extensor lag of the ring finger.    Assessment: Status post right hand abscess/cellulitis    Plan:    1. I have discussed treatment for the extension of his ring finger.  We have discussed the possibility of partial tear or rupture of the extensor mechanism.  He was able to actively extend with only a limited extensor lag and therefore would like to continue to follow him.  Will set him up with a course of occupational therapy for scar massage and to see if we can improve the strength and motion of the ring finger.  If we are not able to improve the strength and motion will consider MRI versus surgical exploration     2. Will set him up with occupational therapy    3. Will follow up with me in 3 weeks

## 2024-02-12 ENCOUNTER — CLINICAL SUPPORT (OUTPATIENT)
Dept: REHABILITATION | Facility: HOSPITAL | Age: 79
End: 2024-02-12
Attending: ORTHOPAEDIC SURGERY
Payer: MEDICARE

## 2024-02-12 DIAGNOSIS — L90.5 SCAR OF FINGER: ICD-10-CM

## 2024-02-12 DIAGNOSIS — M79.644 PAIN IN FINGER OF RIGHT HAND: ICD-10-CM

## 2024-02-12 DIAGNOSIS — L03.011 CELLULITIS OF FINGER OF RIGHT HAND: ICD-10-CM

## 2024-02-12 DIAGNOSIS — M25.641 STIFFNESS OF JOINT, HAND, RIGHT: Primary | ICD-10-CM

## 2024-02-12 DIAGNOSIS — R29.898 WEAKNESS OF RIGHT HAND: ICD-10-CM

## 2024-02-12 PROCEDURE — 97166 OT EVAL MOD COMPLEX 45 MIN: CPT | Mod: PO

## 2024-02-12 PROCEDURE — 97140 MANUAL THERAPY 1/> REGIONS: CPT | Mod: PO

## 2024-02-12 PROCEDURE — 97110 THERAPEUTIC EXERCISES: CPT | Mod: PO

## 2024-02-12 NOTE — PLAN OF CARE
[facility]  Occupational Therapy Initial Evaluation     Name: Mohit Schneider  Clinic Number: [id number]    Therapy Diagnosis:   Encounter Diagnoses   Name Primary?    Cellulitis of finger of right hand     Stiffness of joint, hand, right Yes    Pain in finger of right hand     Weakness of right hand     Scar of finger      Physician: Onel Jones MD    Physician Orders:   Note:  Please begin therapy to the right hand and ring finger.  Include scar massage edema control range of motion and strengthening      Frequency:  2-3 times per week     Duration:  4 weeks      Diagnosis:  Right ring finger laceration with possible extensor tendon injury    Medical Diagnosis:   L03.011 (ICD-10-CM) - Cellulitis of finger of right hand   Surgical Procedure and Date: Decompression and drainage 1/8/2024 ortho/hand MD   S/P: DOI 1/3/024 = S/P 5 weeks on 2/12/2024  Evaluation Date: 2/12/2024  Insurance Authorization Period Expiration:  Calendar year    Plan of Care Certification Period: 4 weeks = 3/10/2024  Date of Return to Referring Provider: 2/28/2024  Visit # / Visits authorized: 1/ 1  FOTO: Florentin  / Shamika = 73.90%  Next Reassessment at 10th visit or by 4 weeks  = 3/10/2024    Time In: 1345  Time Out:  1430  Total Appointment Time (timed & untimed codes):  45 minutes      Precautions: standard and will limit any forceful flexion / strengthening until 8 weeks post DOI      Subjective     Date of Onset: 1/3/2024    History of Current Condition/Mechanism of Injury: Per HPI and/or pt report Hand ortho note from 1/8/2024: HPI:  Mohit Schneider is a 78 y.o. male, who presents to clinic today has a history of an injury to his right hand.  This occurred 5 days ago.  He had a fall in which he had a flexion type injury to the finger.  He noted have a split in the skin.  He did not immediately seek medical attention.  Has had increasing swelling and redness and therefore he presents to clinic  today.        Involved Side: Right    Dominant Side: Right    Prior Surgical Procedure or injuries to:  RUE None reported  LUEVE Has some Parkinson;s and reports impacts his LUE, but not his R    Imaging:  See Epic    Prior Therapy: None reported    Pain:  Functional Pain Scale Rating 0-10:   1/10 on average  1/10 at best  1/10 at worst  Location: R hand ring finger MCP level  Description: Tight  Aggravating Factors: Gripping  Easing Factors: Rest    Occupation:  Retired CPA    Functional Limitations/Social History:    Previous functional status includes: Independent with all ADLs.     Current Functional Status   Home/Living environment: Home with spouse        Limitation of Functional Status as follows:   ADLs/IADLs:  Min to Mod overall limitations reported with involved UE (s)    See  FOTO      Leisure: Guitar playing    Patient's Goals for Therapy: To get his R hand back to full function, with no pain, would like to be able to lift/straighten his R ring finger better    Past Medical History/Physical Systems Review:   Mohit Schneider  has a past medical history of Allergy, Arthritis, Hypertension, and Sleep apnea.    Mohit Schneider  has a past surgical history that includes Tonsillectomy; Eye surgery (Left); Circumcision; Hernia repair (Left); and Arthroplasty of hip by anterior approach (Right, 2/15/2021).    Mohit has a current medication list which includes the following prescription(s): amlodipine, carbidopa-levodopa  mg, cetirizine, clindamycin, irbesartan, meloxicam, multivitamin, pantoprazole, rasagiline, and rosuvastatin, and the following Facility-Administered Medications: testosterone cypionate.    Review of patient's allergies indicates:   Allergen Reactions    Penicillins Other (See Comments)     As a child      Objective     CMS Impairment/Limitation/Restriction for FOTO Hand Survey    Therapist reviewed FOTO scores for Mohit Schneider on 2/12/2024.       Limitation Score:  73.90%      Observation/Inspection/Wound/Incision/Scar   Mild to moderate edema,  non pitting, wound closed/healed with mild to moderate hypertrophy.    Sensation: Grossly WNL,       AROM Hand:   MCP/PIP ext R ring finger 0/0  vs +5/+20 on L  MCP flexion R ring finger = 90 degrees vs 95 degrees on L      AROM Wrist/Forearm: WFL    AROM Elbow:  WFL    Manual Muscle Test: NT                                       and Pinch Strength: NTat this time due to fairly recent injury    Special and/or Standardized Tests:  NT      Treatment     Treatment Time In: 1405  Treatment Time Out:  1430  Total Treatment time separate from Evaluation time:25 minutes.    Munir received therapeutic exercises for 15  minutes including: Initial Home Exercise Program Instruction.  Tendon Gliding Exercises, Wrist AROM with gravity; flex/ext, fingerlifts, and fingerspreads 1-2x10 reps, 4-6 x day. Also place and hold 2-3 min on rolled towel 2-3 x day.    Manual Therapy: Pt seen for Retrograde Massage and Scar Massage to  wrist hand for 8 min total  Instructed on Home Program Retrograde Massage 20 reps, then Scar Massage x20 reps each CW, CCW, across and lengthwise, followed by retrograde x20 reps, 2 x day as tolerated.    Home Exercise Program/Education:  Issued HEP (see patient instructions in EMR) and educated on modality use for pain management . Exercises were reviewed and Munir was able to demonstrate them prior to the end of the session.   Pt received a written copy of exercises to perform at home. Munir demonstrated good  understanding of the education provided.  Pt was advised to perform these exercises free of pain, and to stop performing them if pain occurs.    Patient/Family Education: role of OT, goals for OT, scheduling/cancellations - pt verbalized understanding.     Additional Education provided: Refrain from any strengthening activities R hand at this time.    Assessment     Mohit Schneider is a 78 y.o. male referred to  outpatient occupational therapy and presents with a medical diagnosis of S/P R ring finger laceration.    Following medical record review it is determined that pt will benefit from occupational therapy services in order to maximize pain free and/or functional use of right UE. The following goals were discussed with the patient and patient is in agreement with them as to be addressed in the treatment plan. The patient's rehab potential is  good.     Anticipated barriers to occupational therapy:  None.    Plan of care discussed with patient:   Yes  Patient's spiritual, cultural and educational needs considered and patient is agreeable to the plan of care and goals as stated below:     Medical Necessity is demonstrated by the following  Occupational Profile/History  Co-morbidities and personal factors that may impact the plan of care [] LOW: Brief chart review  [x] MODERATE: Expanded chart review   [] HIGH: Extensive chart review    Moderate / High Support Documentation:  per chart review and pt report      Examination  Performance deficits relating to physical, cognitive or psychosocial skills that result in activity limitations and/or participation restrictions  [] LOW: addressing 1-3 Performance deficits  [x] MODERATE: 3-5 Performance deficits  [] HIGH: 5+ Performance deficits (please support below)    Moderate / High Support Documentation: Per below problem list    Physical:  Joint Mobility  Muscle Power/Strength  Muscle Endurance  Skin Integrity/Scar Formation  Edema  Pain    Cognitive:  No Deficits    Psychosocial:    No Deficits     Treatment Options [] LOW: Limited options  [x] MODERATE: Several options  [] HIGH: Multiple options      Decision Making/ Complexity Score: moderate         The following goals were discussed with the patient and patient is in agreement with them as to be addressed in the treatment plan.     Goals:     Short Term Goals: (4 weeks = 3/10/2024 and/or 10th visit) unless otherwise noted  below.  1. Pt will be independent with HEP in 2 visits.  2. Pt will report decreased pain to a 0/10 at worst in RUE with ADLs in order to increase function/use of UE.   3. Pt will increase AROM MCP flexion R ring finger by 5 degrees = (to 95 degrees)  4. Pt will increase AROM MCP and PIP ext of R ring finger by 5 degrees each (To +5 and +5) to increase function for ADL/IADL and guitar tasks.    Long Term Goals: (by discharge)  1. Pt will report decreased pain to 0/10 with ADLs to allow for increased function/use of UE.   2. Pt will exhibit WNL AROM of  R hand to allow for Independent use of for all ADL/IADL tasks.  3. Pt will exhibit WFL  strength to allow a firm grasp during ADL/IADL (cooking utensils, tool use, carrying groceries, steering wheel, etc.)  4  Pt will return to PLOF with all ADL/IADL, leisure and home mgmt tasks..    Plan   Certification Period/Plan of care expiration:  2/12/2024 to 5/12/2024 with POC expiring in  4 weeks = 3/10/2024  Outpatient Occupational Therapy 2-3 times weekly through current poc expiration date, to include the following interventions:     Moist heat, cold packs, paraffin, fluidotherapy, US, edema control, scar mobilization/scar massage, manual therapy/joint mobilizations,A/PROM, therapeutic exercises/activities, strengthening, desensitization/sensory re-education, orthotic fitting/fabrication/training  PRN, joint protections/energry conservation/adaptive equipment/activity modification HEP/education as well as any other treatments deemed necessary based on the patient's needs or progress.     Pt may be discharged prior to poc expiration date if all goals/desired outcome met or if max rehabilitation potential has been achieved.    Updates Next Visit: To review HEP understanding and compliance and progress with OT tx as tolerated.    KATARZYNA Hyde, CHT    I CERTIFY THE NEED FOR THESE SERVICES FURNISHED UNDER THIS PLAN OF TREATMENT AND WHILE UNDER MY CARE    Physician's  comments prn:

## 2024-02-14 ENCOUNTER — CLINICAL SUPPORT (OUTPATIENT)
Dept: REHABILITATION | Facility: HOSPITAL | Age: 79
End: 2024-02-14
Payer: MEDICARE

## 2024-02-14 DIAGNOSIS — M25.641 STIFFNESS OF JOINT, HAND, RIGHT: Primary | ICD-10-CM

## 2024-02-14 DIAGNOSIS — L90.5 SCAR OF FINGER: ICD-10-CM

## 2024-02-14 DIAGNOSIS — R29.898 WEAKNESS OF RIGHT HAND: ICD-10-CM

## 2024-02-14 DIAGNOSIS — M79.644 PAIN IN FINGER OF RIGHT HAND: ICD-10-CM

## 2024-02-14 PROCEDURE — 97140 MANUAL THERAPY 1/> REGIONS: CPT | Mod: PO

## 2024-02-14 PROCEDURE — 97110 THERAPEUTIC EXERCISES: CPT | Mod: PO

## 2024-02-14 NOTE — PROGRESS NOTES
[facility]  Occupational Therapy Treatment Note     Date: 2/14/2024  Name: Mohit Schneider  Clinic Number: [id number]    Therapy Diagnosis:   Encounter Diagnoses   Name Primary?    Stiffness of joint, hand, right Yes    Pain in finger of right hand     Weakness of right hand     Scar of finger      Physician: Onel Jones MD    Note:  Please begin therapy to the right hand and ring finger.  Include scar massage edema control range of motion and strengthening      Frequency:  2-3 times per week     Duration:  4 weeks      Diagnosis:  Right ring finger laceration with possible extensor tendon injury    Medical Diagnosis:   L03.011 (ICD-10-CM) - Cellulitis of finger of right hand   Surgical Procedure and Date: Decompression and drainage 1/8/2024 ortho/hand MD   S/P: DOI 1/3/024 = S/P 5 weeks on 2/12/2024  Evaluation Date: 2/12/2024  Insurance Authorization Period Expiration:  Calendar year     Plan of Care Certification Period: 4 weeks = 3/10/2024  Date of Return to Referring Provider: 2/28/2024  Visit # / Visits authorized: 2 / 8  FOTO: Eval  / 1 = 73.90%  Next Reassessment at 10th visit or by 4 weeks  = 3/10/2024       Time In: 1645  Time Out: 1720  Total Appointment Time: 30 minutes  Timed: 30 min  Utimed codes: N/A    Precautions: standard and will limit any forceful flexion / strengthening until 8 weeks post DOI       Subjective     Pt reports: No new complaints  He was compliant with home exercise program given last session.   Response to previous treatment:Good  Functional change: None today    Pain 0-10 scale   1/10    Objective   MEASUREMENTS  Last measurements below are from Eval unless otherwise noted.    CMS Impairment/Limitation/Restriction for FOTO Hand Survey     Therapist reviewed FOTO scores for Mohit Schneider on 2/12/2024.         Limitation Score: 73.90%        Observation/Inspection/Wound/Incision/Scar   Mild to moderate edema,  non pitting, wound  closed/healed with mild to moderate hypertrophy.     Sensation: Grossly WNL,        AROM Hand:  2/14/2024  MCP/PIP ext R ring finger +2 (+2)/+5 (+5)  vs +5/+20 on L  MCP flexion R ring finger = 92 (+2) degrees vs 95 degrees on L        AROM Wrist/Forearm: WFL     AROM Elbow:  WFL     Manual Muscle Test: NT                                       and Pinch Strength: NTat this time due to fairly recent injury     Special and/or Standardized Tests:  NT       Treatment     Munir received the following supervised modalities after being cleared for contradictions for   Please see under therapeutic exercises for further on heat and stretch ex with the use of Moist heat (pt was cleared for all contraindication/precautions prior) in order to increase: comfort, blood flow and soft tissue elasticity.       Munir received the following direct contact modalities after being cleared for contraindications for 0 minutes:  -    Munir received the following manual therapy techniques for 15 minutes:   -Scar/retrograde massage R hand / ring finger    Munir received therapeutic exercises for 15 minutes including:  Gross flex/ext 3x10  AAROM Wrist and digit ext with 1# DB 10x10 sec.  Fingerlifts x20  Intrinsic plus x20  Place and hold max ext 2x10    Munir participated in dynamic functional therapeutic activities to improve functional performance for 0 minutes, including:    Home Exercises and Education Provided     Education provided:   -  Cont per previous.    Written Home Exercises Provided:  Patient instructed to cont prior HEP.    Exercises were reviewed and Munir was able to demonstrate them prior to the end of the session.  Munir demonstrated good  understanding of the education provided.     See EMR under Media for exercises provided today and/or prior visit.        Assessment     Pt would continue to benefit from skilled OT. Pt very good initial AROM gains and tolerated progression with Tx well this date. Cont per current POC.        - Progress towards goals:  STG #1 has been met.    Munir is progressing well towards his goals . Pt continues with good rehab potential.     Pt will continue to benefit from skilled outpatient occupational therapy to address the deficits listed in the problem list on initial evaluation in order to maximize pt's level of independence in the home and community.     Anticipated barriers to occupational therapy: None    Pt's spiritual, cultural and educational needs considered and pt agreeable to plan of care and goals.    Goals:  Short Term Goals: (4 weeks = 3/10/2024 and/or 10th visit) unless otherwise noted below.  1. Pt will be independent with HEP in 2 visits.MET  2. Pt will report decreased pain to a 0/10 at worst in RUE with ADLs in order to increase function/use of UE.   3. Pt will increase AROM MCP flexion R ring finger by 5 degrees = (to 95 degrees)  4. Pt will increase AROM MCP and PIP ext of R ring finger by 5 degrees each (To +5 and +5) to increase function for ADL/IADL and guitar tasks.     Long Term Goals: (by discharge)  1. Pt will report decreased pain to 0/10 with ADLs to allow for increased function/use of UE.   2. Pt will exhibit WNL AROM of  R hand to allow for Independent use of for all ADL/IADL tasks.  3. Pt will exhibit WFL  strength to allow a firm grasp during ADL/IADL (cooking utensils, tool use, carrying groceries, steering wheel, etc.)  4  Pt will return to PLOF with all ADL/IADL, leisure and home mgmt tasks..    Plan   Continue Occupational Therapy 2-3 times per week through current [facility] expiration date of 3/10/2024, in order to to decrease pain and edema, and increase A/PROM, strength, and functional use of R upper extremity.    Updates/Grading for next session:  Progress with OT as tolerated.      KATARZYNA Hyde, [place]

## 2024-02-20 ENCOUNTER — CLINICAL SUPPORT (OUTPATIENT)
Dept: UROLOGY | Facility: CLINIC | Age: 79
End: 2024-02-20
Payer: MEDICARE

## 2024-02-20 ENCOUNTER — CLINICAL SUPPORT (OUTPATIENT)
Dept: REHABILITATION | Facility: HOSPITAL | Age: 79
End: 2024-02-20
Payer: MEDICARE

## 2024-02-20 DIAGNOSIS — M79.644 PAIN IN FINGER OF RIGHT HAND: ICD-10-CM

## 2024-02-20 DIAGNOSIS — R29.898 WEAKNESS OF RIGHT HAND: ICD-10-CM

## 2024-02-20 DIAGNOSIS — L90.5 SCAR OF FINGER: ICD-10-CM

## 2024-02-20 DIAGNOSIS — M25.641 STIFFNESS OF JOINT, HAND, RIGHT: Primary | ICD-10-CM

## 2024-02-20 DIAGNOSIS — E29.1 HYPOGONADISM, MALE: Primary | ICD-10-CM

## 2024-02-20 PROCEDURE — 97530 THERAPEUTIC ACTIVITIES: CPT | Mod: PO

## 2024-02-20 PROCEDURE — 96372 THER/PROPH/DIAG INJ SC/IM: CPT | Mod: PBBFAC,PO

## 2024-02-20 PROCEDURE — 97110 THERAPEUTIC EXERCISES: CPT | Mod: PO

## 2024-02-20 PROCEDURE — 99999PBSHW PR PBB SHADOW TECHNICAL ONLY FILED TO HB: Mod: PBBFAC,,,

## 2024-02-20 PROCEDURE — 99999 PR PBB SHADOW E&M-EST. PATIENT-LVL II: CPT | Mod: PBBFAC,,,

## 2024-02-20 PROCEDURE — 99212 OFFICE O/P EST SF 10 MIN: CPT | Mod: PBBFAC,PO

## 2024-02-20 PROCEDURE — 97140 MANUAL THERAPY 1/> REGIONS: CPT | Mod: PO

## 2024-02-20 RX ADMIN — TESTOSTERONE CYPIONATE 150 MG: 200 INJECTION INTRAMUSCULAR at 09:02

## 2024-02-20 NOTE — PROGRESS NOTES
[facility]  Occupational Therapy Treatment Note     Date: 2/21/2024  Name: Mohit Schneider  Clinic Number: [id number]    Therapy Diagnosis:   Encounter Diagnoses   Name Primary?    Stiffness of joint, hand, right Yes    Pain in finger of right hand     Weakness of right hand     Scar of finger        Physician: Onel Jones MD    Note:  Please begin therapy to the right hand and ring finger.  Include scar massage edema control range of motion and strengthening      Frequency:  2-3 times per week     Duration:  4 weeks      Diagnosis:  Right ring finger laceration with possible extensor tendon injury    Medical Diagnosis:   L03.011 (ICD-10-CM) - Cellulitis of finger of right hand   Surgical Procedure and Date: Decompression and drainage 1/8/2024 ortho/hand MD   S/P: DOI 1/3/2024 = S/P  weeks and 6 days on 2/20/2024  Evaluation Date: 2/12/2024  Insurance Authorization Period Expiration:  Calendar year     Plan of Care Certification Period: 4 weeks = 3/10/2024  Date of Return to Referring Provider: 2/28/2024  Visit # / Visits authorized: 4 / 8  FOTO: Eval  / 1 = 73.90%  Next Reassessment at 10th visit or by 4 weeks  = 3/10/2024     Time In:  1600  Time Out: 1645  Total Appointment Time:  45 minutes  Timed: 40 min  Utimed codes: N/A    Precautions: standard and will limit any forceful flexion / strengthening until 8 weeks post DOI       Subjective     Pt reports: No new complaints  He was compliant with home exercise program given last session.   Response to previous treatment:Good  Functional change: None today    Pain 0-10 scale   0/10    Objective   MEASUREMENTS  Last measurements below are from Eval unless otherwise noted.    CMS Impairment/Limitation/Restriction for FOTO Hand Survey     Therapist reviewed FOTO scores for Mohit Schneider on 2/12/2024.         Limitation Score: 73.90%        Observation/Inspection/Wound/Incision/Scar   Mild to moderate edema,  non  pitting, wound closed/healed with mild to moderate hypertrophy.     Sensation: Grossly WNL,        AROM Hand:  2/21/2024  MCP/PIP ext R ring finger +4 (+1)/+5 (+0)  vs +5/+20 on L  MCP flexion R ring finger = 95 (+2) degrees vs 95 degrees on L        AROM Wrist/Forearm: WFL     AROM Elbow:  WFL     Manual Muscle Test: NT                                       and Pinch Strength: NTat this time due to fairly recent injury     Special and/or Standardized Tests:  NT       Treatment     Munir received the following supervised modalities after being cleared for contradictions for   Please see under therapeutic exercises for further on heat and stretch ex with the use of Moist heat (pt was cleared for all contraindication/precautions prior) in order to increase: comfort, blood flow and soft tissue elasticity.       Munir received the following direct contact modalities after being cleared for contraindications for 0 minutes:  -    Munir received the following manual therapy techniques for 10 minutes:   -Scar/retrograde massage R hand / ring finger    Munir received therapeutic exercises for 10 minutes including:  PROM MCP level with hand on 3# DB with moist heat on top x 5 min (NT)  PROM MCP and PIP ext x10 and combined wrist and digit ext x10  Gross flex/ext 3x10  Fingerlifts x25  Fingerspreads x25      Munir participated in dynamic functional therapeutic activities to improve functional performance for 20 minutes, including:  - In hand manipulation with buttons (5 at at time)  x 1 container (NT)  - Isopheres x 3 min  - Tissue srunches x25 (NT)  - Rolls over blue flexbar 3 min  - 9 hole pegs with in hand manipulation x 3 sets  - Wrist wheel forward and back x 3 min each.      Home Exercises and Education Provided     Education provided:   -  Cont per previous.    Written Home Exercises Provided:  Patient instructed to cont prior HEP.    Exercises were reviewed and Munir was able to demonstrate them prior to the end of the  session.  Munir demonstrated good  understanding of the education provided.     See EMR under Media for exercises provided today and/or prior visit.        Assessment     Pt would continue to benefit from skilled OT.  Pt with some continued AROM gains in MCP level ext. Cont per current POC.     - Progress towards goals:  See below for current goal status..    Munir is progressing well towards his goals . Pt continues with good rehab potential.     Pt will continue to benefit from skilled outpatient occupational therapy to address the deficits listed in the problem list on initial evaluation in order to maximize pt's level of independence in the home and community.     Anticipated barriers to occupational therapy: None    Pt's spiritual, cultural and educational needs considered and pt agreeable to plan of care and goals.    Goals:  Short Term Goals: (4 weeks = 3/10/2024 and/or 10th visit) unless otherwise noted below.  1. Pt will be independent with HEP in 2 visits.MET  2. Pt will report decreased pain to a 0/10 at worst in RUE with ADLs in order to increase function/use of UE. MET  3. Pt will increase AROM MCP flexion R ring finger by 5 degrees = (to 95 degrees) MET  4. Pt will increase AROM MCP and PIP ext of R ring finger by 5 degrees each (To +5 and +5) to increase function for ADL/IADL and guitar tasks. MET for PIP progressing at MCP level.     Long Term Goals: (by discharge)  1. Pt will report decreased pain to 0/10 with ADLs to allow for increased function/use of UE.   2. Pt will exhibit WNL AROM of  R hand to allow for Independent use of for all ADL/IADL tasks.  3. Pt will exhibit WFL  strength to allow a firm grasp during ADL/IADL (cooking utensils, tool use, carrying groceries, steering wheel, etc.)  4  Pt will return to PLOF with all ADL/IADL, leisure and home mgmt tasks..    Plan   Continue Occupational Therapy 2-3 times per week through current poc expiration date of 3/10/2024, in order to to decrease  pain and edema, and increase A/PROM, strength, and functional use of R upper extremity.    Updates/Grading for next session:  Progress with OT as tolerated.      KATARZYNA Hyde, [place]

## 2024-02-20 NOTE — PROGRESS NOTES
[facility]  Occupational Therapy Treatment Note     Date: 2/20/2024  Name: Mohit Schneider  Clinic Number: [id number]    Therapy Diagnosis:   Encounter Diagnoses   Name Primary?    Stiffness of joint, hand, right Yes    Pain in finger of right hand     Weakness of right hand     Scar of finger        Physician: Onel Jones MD    Note:  Please begin therapy to the right hand and ring finger.  Include scar massage edema control range of motion and strengthening      Frequency:  2-3 times per week     Duration:  4 weeks      Diagnosis:  Right ring finger laceration with possible extensor tendon injury    Medical Diagnosis:   L03.011 (ICD-10-CM) - Cellulitis of finger of right hand   Surgical Procedure and Date: Decompression and drainage 1/8/2024 ortho/hand MD   S/P: DOI 1/3/2024 = S/P 6 weeks and 6 days on 2/20/2024  Evaluation Date: 2/12/2024  Insurance Authorization Period Expiration:  Calendar year     Plan of Care Certification Period: 4 weeks = 3/10/2024  Date of Return to Referring Provider: 2/28/2024  Visit # / Visits authorized: 3 / 8  FOTO: Eval  / 1 = 73.90%  Next Reassessment at 10th visit or by 4 weeks  = 3/10/2024     Time In:  0945   Time Out: 1030  Total Appointment Time:  45 minutes  Timed: 40 min  Utimed codes: N/A    Precautions: standard and will limit any forceful flexion / strengthening until 8 weeks post DOI       Subjective     Pt reports: No new complaints  He was compliant with home exercise program given last session.   Response to previous treatment:Good  Functional change: None today    Pain 0-10 scale   0/10    Objective   MEASUREMENTS  Last measurements below are from Eval unless otherwise noted.    CMS Impairment/Limitation/Restriction for FOTO Hand Survey     Therapist reviewed FOTO scores for Mohit Schneider on 2/12/2024.         Limitation Score: 73.90%        Observation/Inspection/Wound/Incision/Scar   Mild to moderate edema,  non  pitting, wound closed/healed with mild to moderate hypertrophy.     Sensation: Grossly WNL,        AROM Hand:  2/20/2024  MCP/PIP ext R ring finger +3 (+1)/+5 (+0)  vs +5/+20 on L  MCP flexion R ring finger = 95 (+2) degrees vs 95 degrees on L        AROM Wrist/Forearm: WFL     AROM Elbow:  WFL     Manual Muscle Test: NT                                       and Pinch Strength: NTat this time due to fairly recent injury     Special and/or Standardized Tests:  NT       Treatment     Munir received the following supervised modalities after being cleared for contradictions for   Please see under therapeutic exercises for further on heat and stretch ex with the use of Moist heat (pt was cleared for all contraindication/precautions prior) in order to increase: comfort, blood flow and soft tissue elasticity.       Munir received the following direct contact modalities after being cleared for contraindications for 0 minutes:  -    Munir received the following manual therapy techniques for 10 minutes:   -Scar/retrograde massage R hand / ring finger    Munir received therapeutic exercises for 10 minutes including:  PROM MCP level with hand on 3# DB with moist heat on top x 5 min  Gross flex/ext 3x10  Fingerlifts x25  Fingerspreads x25      Munir participated in dynamic functional therapeutic activities to improve functional performance for 20 minutes, including:  - In hand manipulation with buttons (5 at at time)  x 1 container  - Isopheres x 3 min  - Tissue srunches x25  - Rolls over blue flexbar 3 min      Home Exercises and Education Provided     Education provided:   -  Cont per previous.    Written Home Exercises Provided:  Patient instructed to cont prior HEP.    Exercises were reviewed and Munir was able to demonstrate them prior to the end of the session.  uMnir demonstrated good  understanding of the education provided.     See EMR under Media for exercises provided today and/or prior visit.        Assessment     Pt  would continue to benefit from skilled OT.  Pt with some continued AROM gains in MCP level ext and flexion is now WNL. Cont per current POC.     - Progress towards goals:  See below for current goal status..    Munir is progressing well towards his goals . Pt continues with good rehab potential.     Pt will continue to benefit from skilled outpatient occupational therapy to address the deficits listed in the problem list on initial evaluation in order to maximize pt's level of independence in the home and community.     Anticipated barriers to occupational therapy: None    Pt's spiritual, cultural and educational needs considered and pt agreeable to plan of care and goals.    Goals:  Short Term Goals: (4 weeks = 3/10/2024 and/or 10th visit) unless otherwise noted below.  1. Pt will be independent with HEP in 2 visits.MET  2. Pt will report decreased pain to a 0/10 at worst in RUE with ADLs in order to increase function/use of UE. MET  3. Pt will increase AROM MCP flexion R ring finger by 5 degrees = (to 95 degrees) MET  4. Pt will increase AROM MCP and PIP ext of R ring finger by 5 degrees each (To +5 and +5) to increase function for ADL/IADL and guitar tasks. MET for PIP progressing at MCP level.     Long Term Goals: (by discharge)  1. Pt will report decreased pain to 0/10 with ADLs to allow for increased function/use of UE.   2. Pt will exhibit WNL AROM of  R hand to allow for Independent use of for all ADL/IADL tasks.  3. Pt will exhibit WFL  strength to allow a firm grasp during ADL/IADL (cooking utensils, tool use, carrying groceries, steering wheel, etc.)  4  Pt will return to PLOF with all ADL/IADL, leisure and home mgmt tasks..    Plan   Continue Occupational Therapy 2-3 times per week through current poc expiration date of 3/10/2024, in order to to decrease pain and edema, and increase A/PROM, strength, and functional use of R upper extremity.    Updates/Grading for next session:  Progress with OT as  tolerated.      KATARZYNA Hyde, CHT

## 2024-02-21 ENCOUNTER — CLINICAL SUPPORT (OUTPATIENT)
Dept: REHABILITATION | Facility: HOSPITAL | Age: 79
End: 2024-02-21
Payer: MEDICARE

## 2024-02-21 DIAGNOSIS — M25.641 STIFFNESS OF JOINT, HAND, RIGHT: Primary | ICD-10-CM

## 2024-02-21 DIAGNOSIS — L90.5 SCAR OF FINGER: ICD-10-CM

## 2024-02-21 DIAGNOSIS — M79.644 PAIN IN FINGER OF RIGHT HAND: ICD-10-CM

## 2024-02-21 DIAGNOSIS — R29.898 WEAKNESS OF RIGHT HAND: ICD-10-CM

## 2024-02-21 PROCEDURE — 97110 THERAPEUTIC EXERCISES: CPT | Mod: PO

## 2024-02-21 PROCEDURE — 97530 THERAPEUTIC ACTIVITIES: CPT | Mod: PO

## 2024-02-21 PROCEDURE — 97140 MANUAL THERAPY 1/> REGIONS: CPT | Mod: PO

## 2024-02-22 ENCOUNTER — PATIENT MESSAGE (OUTPATIENT)
Dept: ORTHOPEDICS | Facility: CLINIC | Age: 79
End: 2024-02-22
Payer: MEDICARE

## 2024-02-27 ENCOUNTER — CLINICAL SUPPORT (OUTPATIENT)
Dept: REHABILITATION | Facility: HOSPITAL | Age: 79
End: 2024-02-27
Payer: MEDICARE

## 2024-02-27 DIAGNOSIS — M25.641 STIFFNESS OF JOINT, HAND, RIGHT: Primary | ICD-10-CM

## 2024-02-27 DIAGNOSIS — L90.5 SCAR OF FINGER: ICD-10-CM

## 2024-02-27 DIAGNOSIS — R29.898 WEAKNESS OF RIGHT HAND: ICD-10-CM

## 2024-02-27 DIAGNOSIS — M79.644 PAIN IN FINGER OF RIGHT HAND: ICD-10-CM

## 2024-02-27 PROCEDURE — 97140 MANUAL THERAPY 1/> REGIONS: CPT | Mod: PO

## 2024-02-27 PROCEDURE — 97530 THERAPEUTIC ACTIVITIES: CPT | Mod: PO

## 2024-02-27 PROCEDURE — 97110 THERAPEUTIC EXERCISES: CPT | Mod: PO

## 2024-02-27 NOTE — PROGRESS NOTES
[facility]  Occupational Therapy Treatment Note     Date: 2/27/2024  Name: Mohit Schneider  Clinic Number: [id number]    Therapy Diagnosis:   Encounter Diagnoses   Name Primary?    Stiffness of joint, hand, right Yes    Pain in finger of right hand     Weakness of right hand     Scar of finger        Physician: Onel Jones MD    Note:  Please begin therapy to the right hand and ring finger.  Include scar massage edema control range of motion and strengthening      Frequency:  2-3 times per week     Duration:  4 weeks      Diagnosis:  Right ring finger laceration with possible extensor tendon injury    Medical Diagnosis:   L03.011 (ICD-10-CM) - Cellulitis of finger of right hand   Surgical Procedure and Date: Decompression and drainage 1/8/2024 ortho/hand MD   S/P: DOI 1/3/2024 = S/P  7 weeks and 6 days on 2/27/2024  Evaluation Date: 2/12/2024  Insurance Authorization Period Expiration:  Calendar year     Plan of Care Certification Period: 4 weeks = 3/10/2024  Date of Return to Referring Provider: 2/28/2024  Visit # / Visits authorized: 5 / 8  FOTO: Eval  / 1 = 73.90%  Next Reassessment at 10th visit or by 4 weeks  = 3/10/2024     Time In:  1030  Time Out: 1108  Total Appointment Time:  38 minutes  Timed: 38 min  Utimed codes: N/A    Precautions: standard and will limit any forceful flexion / strengthening until 8 weeks post DOI       Subjective     Pt reports: No new complaints  He was compliant with home exercise program given last session.   Response to previous treatment:Good  Functional change: None today    Pain 0-10 scale   0/10    Objective   MEASUREMENTS  Last measurements below are from Eval unless otherwise noted.    CMS Impairment/Limitation/Restriction for FOTO Hand Survey     Therapist reviewed FOTO scores for Mohit Schneider on 2/12/2024.         Limitation Score: 73.90%        Observation/Inspection/Wound/Incision/Scar   Mild to moderate edema,  non  pitting, wound closed/healed with mild to moderate hypertrophy.     Sensation: Grossly WNL,        AROM Hand:  2/27/2024  MCP/PIP ext R ring finger +7 (+3)/+7 (+2)  vs +5/+20 on L  MCP flexion R ring finger = 95 (+2) degrees vs 95 degrees on L        AROM Wrist/Forearm: WFL     AROM Elbow:  WFL     Manual Muscle Test: NT                                       and Pinch Strength: NT at this time due to fairly recent injury     Special and/or Standardized Tests:  NT       Treatment     Munir received the following supervised modalities after being cleared for contradictions for   Please see under therapeutic exercises for further on heat and stretch ex with the use of Moist heat (pt was cleared for all contraindication/precautions prior) in order to increase: comfort, blood flow and soft tissue elasticity.       Munir received the following direct contact modalities after being cleared for contraindications for 0 minutes:  -    Munir received the following manual therapy techniques for 10 minutes:   -Scar/retrograde massage R hand / ring finger    Munir received therapeutic exercises for 20 minutes including:  PROM MCP level with Ring finger on 1# DB with moist heat on top x 5 min   PROM MCP and PIP ext x10 and combined wrist and digit ext x10  Fingerlifts x 30  Fingerspreads x 30      Munir participated in dynamic functional therapeutic activities to improve functional performance for 8 minutes, including:  - In hand manipulation with buttons (5 at at time)  x 1 container (NT)  - Isopheres x 3 min  - Pillow case scrunches x30  - Rolls over blue flexbar 3 min  - 9 hole pegs with in hand manipulation x 3 sets (NT)  - Wrist wheel forward and back x 3 min each.      Home Exercises and Education Provided     Education provided:   -  Cont per previous.    Written Home Exercises Provided:  Patient instructed to cont prior HEP.    Exercises were reviewed and Munir was able to demonstrate them prior to the end of the session.   Munir demonstrated good  understanding of the education provided.     See EMR under Media for exercises provided today and/or prior visit.        Assessment     Pt would continue to benefit from skilled OT.  Pt with Excellent AROM R ring finger ext at MCP and PIP. Pt to MD next date and may be appropriate for D/C next visit with ome continued home program / putty use to maximize outcome and restore full functional strength     - Progress towards goals:  See below for current goal status..    Munir is progressing well towards his goals . Pt continues with good rehab potential.     Pt will continue to benefit from skilled outpatient occupational therapy to address the deficits listed in the problem list on initial evaluation in order to maximize pt's level of independence in the home and community.     Anticipated barriers to occupational therapy: None    Pt's spiritual, cultural and educational needs considered and pt agreeable to plan of care and goals.    Goals:  Short Term Goals: (4 weeks = 3/10/2024 and/or 10th visit) unless otherwise noted below.  1. Pt will be independent with HEP in 2 visits.MET  2. Pt will report decreased pain to a 0/10 at worst in RUE with ADLs in order to increase function/use of UE. MET  3. Pt will increase AROM MCP flexion R ring finger by 5 degrees = (to 95 degrees) MET  4. Pt will increase AROM MCP and PIP ext of R ring finger by 5 degrees each (To +5 and +5) to increase function for ADL/IADL and guitar tasks. MET for PIP progressing at MCP level.     Long Term Goals: (by discharge)  1. Pt will report decreased pain to 0/10 with ADLs to allow for increased function/use of UE.   2. Pt will exhibit WNL AROM of  R hand to allow for Independent use of for all ADL/IADL tasks.  3. Pt will exhibit WFL  strength to allow a firm grasp during ADL/IADL (cooking utensils, tool use, carrying groceries, steering wheel, etc.)  4  Pt will return to PLOF with all ADL/IADL, leisure and home mgmt  tasks..    Plan   Continue Occupational Therapy 2-3 times per week through current poc expiration date of 3/10/2024, in order to to decrease pain and edema, and increase A/PROM, strength, and functional use of R upper extremity.    Updates/Grading for next session:  Progress with OT as tolerated.      KATARZYNA Hyde, [place]

## 2024-02-29 ENCOUNTER — TELEPHONE (OUTPATIENT)
Dept: HEPATOLOGY | Facility: CLINIC | Age: 79
End: 2024-02-29
Payer: MEDICARE

## 2024-02-29 NOTE — TELEPHONE ENCOUNTER
Left a voice message for Tate Jennie to please call back about having to reschedule the 3/13/2024 appt to another date and time

## 2024-03-01 ENCOUNTER — CLINICAL SUPPORT (OUTPATIENT)
Dept: REHABILITATION | Facility: HOSPITAL | Age: 79
End: 2024-03-01
Payer: MEDICARE

## 2024-03-01 DIAGNOSIS — R29.898 WEAKNESS OF RIGHT HAND: ICD-10-CM

## 2024-03-01 DIAGNOSIS — M79.644 PAIN IN FINGER OF RIGHT HAND: ICD-10-CM

## 2024-03-01 DIAGNOSIS — M25.641 STIFFNESS OF JOINT, HAND, RIGHT: Primary | ICD-10-CM

## 2024-03-01 DIAGNOSIS — L90.5 SCAR OF FINGER: ICD-10-CM

## 2024-03-01 PROCEDURE — 97140 MANUAL THERAPY 1/> REGIONS: CPT | Mod: PO

## 2024-03-01 PROCEDURE — 97110 THERAPEUTIC EXERCISES: CPT | Mod: PO

## 2024-03-01 PROCEDURE — 97530 THERAPEUTIC ACTIVITIES: CPT | Mod: PO

## 2024-03-01 NOTE — PROGRESS NOTES
[facility]  Occupational Therapy Treatment Note     Date: 3/1/2024  Name: Mohit Schneider  Clinic Number: [id number]    Therapy Diagnosis:   Encounter Diagnoses   Name Primary?    Stiffness of joint, hand, right Yes    Pain in finger of right hand     Weakness of right hand     Scar of finger        Physician: Onel Jones MD    Note:  Please begin therapy to the right hand and ring finger.  Include scar massage edema control range of motion and strengthening      Frequency:  2-3 times per week     Duration:  4 weeks      Diagnosis:  Right ring finger laceration with possible extensor tendon injury    Medical Diagnosis:   L03.011 (ICD-10-CM) - Cellulitis of finger of right hand   Surgical Procedure and Date: Decompression and drainage 1/8/2024 ortho/hand MD   S/P: DOI 1/3/2024 = S/P  greater than 8 weeks on 3/1/2024    Evaluation Date: 2/12/2024  Insurance Authorization Period Expiration:  Calendar year     Plan of Care Certification Period: 4 weeks = 3/10/2024  Date of Return to Referring Provider: 2/28/2024 has to be R/S by pt will now see 3/11/2024  Visit # / Visits authorized: 6 / 8  FOTO: Eval  / 1 = 73.90%  Next Reassessment at 10th visit or by 4 weeks  = 3/10/2024     Time In:  1030  Time Out: 1108  Total Appointment Time:  38 minutes  Timed: 38 min  Utimed codes: N/A    Precautions: standard and will limit any forceful flexion / strengthening until 8 weeks post DOI       Subjective     Pt reports: No new complaints  He was compliant with home exercise program given last session.   Response to previous treatment:Good  Functional change: None today    Pain 0-10 scale   0/10    Objective   MEASUREMENTS  Last measurements below are from Eval unless otherwise noted.    CMS Impairment/Limitation/Restriction for FOTO Hand Survey     Therapist reviewed FOTO scores for Mohit Schneider on 2/12/2024.         Limitation Score: 73.90%         Observation/Inspection/Wound/Incision/Scar   Mild to moderate edema,  non pitting, wound closed/healed with mild to moderate hypertrophy.     Sensation: Grossly WNL,        AROM Hand:  2/27/2024  MCP/PIP ext R ring finger +7 (+3)/+7 (+2)  vs +5/+20 on L  MCP flexion R ring finger = 95 (+2) degrees vs 95 degrees on L        AROM Wrist/Forearm: WFL     AROM Elbow:  WFL     Manual Muscle Test: NT                                       and Pinch Strength: NT at this time due to fairly recent injury     Special and/or Standardized Tests:  NT       Treatment     Munir received the following supervised modalities after being cleared for contradictions for   Please see under therapeutic exercises for further on heat and stretch ex with the use of Moist heat (pt was cleared for all contraindication/precautions prior) in order to increase: comfort, blood flow and soft tissue elasticity.       Munir received the following direct contact modalities after being cleared for contraindications for 0 minutes:  -    Munir received the following manual therapy techniques for 10 minutes:   -Scar/retrograde massage R hand / ring finger    Munir received therapeutic exercises for 20 minutes including:  PROM MCP level with digits 2-5 on 2# DB with moist heat on top x 5 min   PROM MCP and PIP ext x10 and combined wrist and digit ext x10  Fingerlifts x 30  Fingerspreads x 30      Munir participated in dynamic functional therapeutic activities to improve functional performance for 8 minutes, including:  Putty: grasp roll-outs and pinches 3x10 each with 3/4 yellow and 1/4 green. Also instructed home program for putty.        Home Exercises and Education Provided     Education provided:   -  Cont per previous. home program for putty.    Written Home Exercises Provided:  Patient instructed to cont prior HEP. home program for putty.    Exercises were reviewed and Munir was able to demonstrate them prior to the end of the session.  Munir  demonstrated good  understanding of the education provided.     See EMR under Media for exercises provided today and/or prior visit.        Assessment     Pt would continue to benefit from skilled OT.  Pt tolerated progression with Tx well this date. Cont per current POC.     - Progress towards goals:  See below for current goal status..    Munir is progressing well towards his goals . Pt continues with good rehab potential.     Pt will continue to benefit from skilled outpatient occupational therapy to address the deficits listed in the problem list on initial evaluation in order to maximize pt's level of independence in the home and community.     Anticipated barriers to occupational therapy: None    Pt's spiritual, cultural and educational needs considered and pt agreeable to plan of care and goals.    Goals:  Short Term Goals: (4 weeks = 3/10/2024 and/or 10th visit) unless otherwise noted below.  1. Pt will be independent with HEP in 2 visits.MET  2. Pt will report decreased pain to a 0/10 at worst in RUE with ADLs in order to increase function/use of UE. MET  3. Pt will increase AROM MCP flexion R ring finger by 5 degrees = (to 95 degrees) MET  4. Pt will increase AROM MCP and PIP ext of R ring finger by 5 degrees each (To +5 and +5) to increase function for ADL/IADL and guitar tasks. MET for PIP progressing at MCP level.     Long Term Goals: (by discharge)  1. Pt will report decreased pain to 0/10 with ADLs to allow for increased function/use of UE.   2. Pt will exhibit WNL AROM of  R hand to allow for Independent use of for all ADL/IADL tasks.  3. Pt will exhibit WFL  strength to allow a firm grasp during ADL/IADL (cooking utensils, tool use, carrying groceries, steering wheel, etc.)  4  Pt will return to PLOF with all ADL/IADL, leisure and home mgmt tasks..    Plan   Continue Occupational Therapy 2-3 times per week through current poc expiration date of 3/10/2024, in order to to decrease pain and edema,  and increase A/PROM, strength, and functional use of R upper extremity.    Updates/Grading for next session:  Progress with OT as tolerated.      KATARZYNA Hyde, CHT

## 2024-03-05 ENCOUNTER — CLINICAL SUPPORT (OUTPATIENT)
Dept: UROLOGY | Facility: CLINIC | Age: 79
End: 2024-03-05
Payer: MEDICARE

## 2024-03-05 ENCOUNTER — CLINICAL SUPPORT (OUTPATIENT)
Dept: REHABILITATION | Facility: HOSPITAL | Age: 79
End: 2024-03-05
Payer: MEDICARE

## 2024-03-05 DIAGNOSIS — R29.898 WEAKNESS OF RIGHT HAND: ICD-10-CM

## 2024-03-05 DIAGNOSIS — M79.644 PAIN IN FINGER OF RIGHT HAND: ICD-10-CM

## 2024-03-05 DIAGNOSIS — E29.1 HYPOGONADISM, MALE: Primary | ICD-10-CM

## 2024-03-05 DIAGNOSIS — M25.641 STIFFNESS OF JOINT, HAND, RIGHT: Primary | ICD-10-CM

## 2024-03-05 DIAGNOSIS — L90.5 SCAR OF FINGER: ICD-10-CM

## 2024-03-05 PROCEDURE — 99999PBSHW PR PBB SHADOW TECHNICAL ONLY FILED TO HB: Mod: PBBFAC,,,

## 2024-03-05 PROCEDURE — 99211 OFF/OP EST MAY X REQ PHY/QHP: CPT | Mod: PBBFAC,PO

## 2024-03-05 PROCEDURE — 96372 THER/PROPH/DIAG INJ SC/IM: CPT | Mod: PBBFAC,PO

## 2024-03-05 PROCEDURE — 99999 PR PBB SHADOW E&M-EST. PATIENT-LVL I: CPT | Mod: PBBFAC,,,

## 2024-03-05 RX ADMIN — TESTOSTERONE CYPIONATE 150 MG: 200 INJECTION INTRAMUSCULAR at 09:03

## 2024-03-05 NOTE — PROGRESS NOTES
[facility]  Occupational Therapy D/C and Treatment Note     Date: 3/5/2024  Name: Mohit Schneider  Clinic Number: [id number]    Therapy Diagnosis:   Encounter Diagnoses   Name Primary?    Stiffness of joint, hand, right Yes    Pain in finger of right hand     Weakness of right hand     Scar of finger        Physician: Onel Jones MD    Note:  Please begin therapy to the right hand and ring finger.  Include scar massage edema control range of motion and strengthening      Frequency:  2-3 times per week     Duration:  4 weeks      Diagnosis:  Right ring finger laceration with possible extensor tendon injury    Medical Diagnosis:   L03.011 (ICD-10-CM) - Cellulitis of finger of right hand   Surgical Procedure and Date: Decompression and drainage 1/8/2024 ortho/hand MD   S/P: DOI 1/3/2024 = S/P  greater than 8 weeks on 3/1/2024    Evaluation Date: 2/12/2024  Insurance Authorization Period Expiration:  Calendar year     Plan of Care Certification Period: 4 weeks = 3/10/2024  Date of Return to Referring Provider: 2/28/2024 has to be R/S by pt will now see 3/11/2024  Visit # / Visits authorized: 7 / 8  FOTO: Eval  / 1 = 74.% and D/C 79%  Next Reassessment at 10th visit or by 4 weeks  = 3/10/2024     Time In: 0946  Time Out: 1019  Total Appointment Time: 33 min  Utimed codes: N/A    Precautions: standard and will limit any forceful flexion / strengthening until 8 weeks post DOI       Subjective     Pt reports: No new complaints  He was compliant with home exercise program given last session.   Response to previous treatment:Good  Functional change: None today    Pain 0-10 scale   0/10    Objective   MEASUREMENTS  Last measurements below are from Eval unless otherwise noted.    CMS Impairment/Limitation/Restriction for FOTO Hand Survey     Therapist reviewed FOTO scores for Mohit Schneider on 2/12/2024.         Limitation Score: 74%    Limitation Score  79%         Observation/Inspection/Wound/Incision/Scar   Mild to moderate edema,  non pitting, wound closed/healed with mild to moderate hypertrophy.     Sensation: Grossly WNL,        AROM Hand:  3/5/2024    MCP/PIP ext R ring finger  grossly WNL  MCP flexion R ring finger   grossly WNL   AROM Wrist/Forearm: WFL     AROM Elbow:  WFL     Manual Muscle Test: NT                                       and Pinch Strength: WFL     Special and/or Standardized Tests:  NT       Treatment     Munir received the following supervised modalities after being cleared for contradictions for   Please see under therapeutic exercises for further on heat and stretch ex with the use of Moist heat (pt was cleared for all contraindication/precautions prior) in order to increase: comfort, blood flow and soft tissue elasticity.       Munir received the following direct contact modalities after being cleared for contraindications for 0 minutes:  -    Munir received the following manual therapy techniques for 3 minutes:   -Scar/retrograde massage R hand / ring finger    Munir received therapeutic exercises for 15  minutes including:  PROM MCP level with digits 2-5 on 2# DB with moist heat on top x 5 min   PROM MCP and PIP ext x10 and combined wrist and digit ext x10  Fingerlifts x 30  Fingerspreads x 30      Munir participated in dynamic functional therapeutic activities to improve functional performance for 15 minutes, including:  Flexbar palm up/palm down, wrist flexion, wrist extension   Rolls over yellow flexbar 3 min      Home Exercises and Education Provided     Education provided:   -  Cont per previous. Consider flexbar for home use.    Written Home Exercises Provided:  Patient instructed to cont prior HEP. home program for putty.    Exercises were reviewed and Munir was able to demonstrate them prior to the end of the session.  Munir demonstrated good  understanding of the education provided.     See EMR under Media for exercises provided today  and/or prior visit.        Assessment      Pt has met all goals, is pleased with progress, and is ready for D/C at this time.    Goals:  Short Term Goals: (4 weeks = 3/10/2024 and/or 10th visit) unless otherwise noted below. ALL MET  1. Pt will be independent with HEP in 2 visits.MET  2. Pt will report decreased pain to a 0/10 at worst in RUE with ADLs in order to increase function/use of UE. MET  3. Pt will increase AROM MCP flexion R ring finger by 5 degrees = (to 95 degrees) MET  4. Pt will increase AROM MCP and PIP ext of R ring finger by 5 degrees each (To +5 and +5) to increase function for ADL/IADL and guitar tasks. MET for PIP progressing at MCP level.     Long Term Goals: (by discharge) ALL MET  1. Pt will report decreased pain to 0/10 with ADLs to allow for increased function/use of UE.   2. Pt will exhibit WNL AROM of  R hand to allow for Independent use of for all ADL/IADL tasks.  3. Pt will exhibit WFL  strength to allow a firm grasp during ADL/IADL (cooking utensils, tool use, carrying groceries, steering wheel, etc.)  4  Pt will return to PLOF with all ADL/IADL, leisure and home mgmt tasks..    Plan   Pt for D/C from OT services this date. Pt. to cont HEP as tolerated and will follow up with referring provider for any further tx needs.    KATARZYNA Hyde, [place]

## 2024-03-11 ENCOUNTER — OFFICE VISIT (OUTPATIENT)
Dept: ORTHOPEDICS | Facility: CLINIC | Age: 79
End: 2024-03-11
Payer: MEDICARE

## 2024-03-11 DIAGNOSIS — L03.011 CELLULITIS OF FINGER OF RIGHT HAND: Primary | ICD-10-CM

## 2024-03-11 DIAGNOSIS — M70.22 OLECRANON BURSITIS, LEFT ELBOW: ICD-10-CM

## 2024-03-11 PROCEDURE — 99999 PR PBB SHADOW E&M-EST. PATIENT-LVL II: CPT | Mod: PBBFAC,,, | Performed by: ORTHOPAEDIC SURGERY

## 2024-03-11 PROCEDURE — 99212 OFFICE O/P EST SF 10 MIN: CPT | Mod: PBBFAC,PO | Performed by: ORTHOPAEDIC SURGERY

## 2024-03-11 PROCEDURE — 99213 OFFICE O/P EST LOW 20 MIN: CPT | Mod: S$PBB,,, | Performed by: ORTHOPAEDIC SURGERY

## 2024-03-11 NOTE — PROGRESS NOTES
Mr Schneider returns to clinic today.  Has a history of a right hand infection with possible extensor tendon rupture of the ring finger.  He has been working with therapy and has had significant improvement in the function of his hand.  He also was complaining about some swelling on his left elbow that he would like to have evaluated     Physical exam: Examination of the right hand reveals that the wound over the MCP joint of the middle finger is now well healed.  There is no edema or erythema.  Is able to hold the finger out in full extension and make a full composite fist.  There was no significant extensor lag remaining.    Examination of the left elbow reveals that there is an olecranon bursitis noted.  There is no erythema or increased warmth.  Palpation does not produce significant tenderness.  Has full range of motion     Assessment: Right hand abscess and possible extensor tendon injury, left elbow olecranon bursitis     Plan:    1. At this time he is able to make a full composite fist and fully extend the ring finger without a lag and therefore I do not see a reason for MRI or wound exploration     2. Will continue to monitor his left elbow but he can obtain a compression sleeve to wear to see if we can get this to resolve     3. He will follow up with me as needed

## 2024-03-19 ENCOUNTER — CLINICAL SUPPORT (OUTPATIENT)
Dept: UROLOGY | Facility: CLINIC | Age: 79
End: 2024-03-19
Payer: MEDICARE

## 2024-03-19 DIAGNOSIS — E29.1 HYPOGONADISM, MALE: Primary | ICD-10-CM

## 2024-03-19 PROCEDURE — 99999PBSHW PR PBB SHADOW TECHNICAL ONLY FILED TO HB: Mod: PBBFAC,,,

## 2024-03-19 PROCEDURE — 99999 PR PBB SHADOW E&M-EST. PATIENT-LVL III: CPT | Mod: PBBFAC,,,

## 2024-03-19 PROCEDURE — 96372 THER/PROPH/DIAG INJ SC/IM: CPT | Mod: PBBFAC,PO

## 2024-03-19 PROCEDURE — 99213 OFFICE O/P EST LOW 20 MIN: CPT | Mod: PBBFAC,PO,25

## 2024-03-19 RX ADMIN — TESTOSTERONE CYPIONATE 150 MG: 200 INJECTION INTRAMUSCULAR at 09:03

## 2024-03-19 NOTE — PROGRESS NOTES
Patient to clinic for testosterone injection. See MAR. Patient tolerated well and declined to wait 20 minutes post injection.

## 2024-04-02 ENCOUNTER — CLINICAL SUPPORT (OUTPATIENT)
Dept: UROLOGY | Facility: CLINIC | Age: 79
End: 2024-04-02
Payer: MEDICARE

## 2024-04-02 DIAGNOSIS — E29.1 HYPOGONADISM, MALE: Primary | ICD-10-CM

## 2024-04-02 PROCEDURE — 99212 OFFICE O/P EST SF 10 MIN: CPT | Mod: PBBFAC,PO,25

## 2024-04-02 PROCEDURE — 96372 THER/PROPH/DIAG INJ SC/IM: CPT | Mod: PBBFAC,PO

## 2024-04-02 PROCEDURE — 99999 PR PBB SHADOW E&M-EST. PATIENT-LVL II: CPT | Mod: PBBFAC,,,

## 2024-04-02 PROCEDURE — 99999PBSHW PR PBB SHADOW TECHNICAL ONLY FILED TO HB: Mod: PBBFAC,,,

## 2024-04-02 RX ADMIN — TESTOSTERONE CYPIONATE 150 MG: 200 INJECTION INTRAMUSCULAR at 09:04

## 2024-04-16 ENCOUNTER — CLINICAL SUPPORT (OUTPATIENT)
Dept: UROLOGY | Facility: CLINIC | Age: 79
End: 2024-04-16
Payer: MEDICARE

## 2024-04-16 DIAGNOSIS — E29.1 HYPOGONADISM, MALE: Primary | ICD-10-CM

## 2024-04-16 PROCEDURE — 99999PBSHW PR PBB SHADOW TECHNICAL ONLY FILED TO HB: Mod: PBBFAC,,,

## 2024-04-16 PROCEDURE — 99212 OFFICE O/P EST SF 10 MIN: CPT | Mod: PBBFAC,PO,25

## 2024-04-16 PROCEDURE — 96372 THER/PROPH/DIAG INJ SC/IM: CPT | Mod: PBBFAC,PO

## 2024-04-16 PROCEDURE — 99999 PR PBB SHADOW E&M-EST. PATIENT-LVL II: CPT | Mod: PBBFAC,,,

## 2024-04-16 RX ORDER — TESTOSTERONE CYPIONATE 200 MG/ML
150 INJECTION, SOLUTION INTRAMUSCULAR
Status: SHIPPED | OUTPATIENT
Start: 2024-04-16 | End: 2024-07-09

## 2024-04-16 RX ADMIN — TESTOSTERONE CYPIONATE 150 MG: 200 INJECTION INTRAMUSCULAR at 09:04

## 2024-04-30 ENCOUNTER — CLINICAL SUPPORT (OUTPATIENT)
Dept: UROLOGY | Facility: CLINIC | Age: 79
End: 2024-04-30
Payer: MEDICARE

## 2024-04-30 DIAGNOSIS — E29.1 HYPOGONADISM, MALE: Primary | ICD-10-CM

## 2024-04-30 PROCEDURE — 96372 THER/PROPH/DIAG INJ SC/IM: CPT | Mod: PBBFAC,PO

## 2024-04-30 PROCEDURE — 99212 OFFICE O/P EST SF 10 MIN: CPT | Mod: PBBFAC,PO,25

## 2024-04-30 PROCEDURE — 99999 PR PBB SHADOW E&M-EST. PATIENT-LVL II: CPT | Mod: PBBFAC,,,

## 2024-04-30 PROCEDURE — 99999PBSHW PR PBB SHADOW TECHNICAL ONLY FILED TO HB: Mod: PBBFAC,,,

## 2024-04-30 RX ADMIN — TESTOSTERONE CYPIONATE 150 MG: 200 INJECTION INTRAMUSCULAR at 09:04

## 2024-04-30 NOTE — PROGRESS NOTES
Patient to clinic for testosterone injection 150mg. See MAR. Patient tolerated well and declined to wait 20 minutes post injection.

## 2024-05-14 ENCOUNTER — PATIENT MESSAGE (OUTPATIENT)
Dept: UROLOGY | Facility: CLINIC | Age: 79
End: 2024-05-14
Payer: MEDICARE

## 2024-05-15 ENCOUNTER — CLINICAL SUPPORT (OUTPATIENT)
Dept: UROLOGY | Facility: CLINIC | Age: 79
End: 2024-05-15
Payer: MEDICARE

## 2024-05-15 DIAGNOSIS — E29.1 HYPOGONADISM, MALE: Primary | ICD-10-CM

## 2024-05-15 PROCEDURE — 99212 OFFICE O/P EST SF 10 MIN: CPT | Mod: PBBFAC,PO,25

## 2024-05-15 PROCEDURE — 99999 PR PBB SHADOW E&M-EST. PATIENT-LVL II: CPT | Mod: PBBFAC,,,

## 2024-05-15 PROCEDURE — 99999PBSHW PR PBB SHADOW TECHNICAL ONLY FILED TO HB: Mod: PBBFAC,,,

## 2024-05-15 PROCEDURE — 96372 THER/PROPH/DIAG INJ SC/IM: CPT | Mod: PBBFAC,PO

## 2024-05-15 RX ADMIN — TESTOSTERONE CYPIONATE 150 MG: 200 INJECTION INTRAMUSCULAR at 02:05

## 2024-05-29 ENCOUNTER — CLINICAL SUPPORT (OUTPATIENT)
Dept: UROLOGY | Facility: CLINIC | Age: 79
End: 2024-05-29
Payer: MEDICARE

## 2024-05-29 DIAGNOSIS — E29.1 HYPOGONADISM, MALE: Primary | ICD-10-CM

## 2024-05-29 PROCEDURE — 99212 OFFICE O/P EST SF 10 MIN: CPT | Mod: PBBFAC,PO,25

## 2024-05-29 PROCEDURE — 99999PBSHW PR PBB SHADOW TECHNICAL ONLY FILED TO HB: Mod: PBBFAC,,,

## 2024-05-29 PROCEDURE — 96372 THER/PROPH/DIAG INJ SC/IM: CPT | Mod: PBBFAC,PO

## 2024-05-29 PROCEDURE — 99999 PR PBB SHADOW E&M-EST. PATIENT-LVL II: CPT | Mod: PBBFAC,,,

## 2024-05-29 RX ADMIN — TESTOSTERONE CYPIONATE 150 MG: 200 INJECTION INTRAMUSCULAR at 09:05

## 2024-06-10 ENCOUNTER — PATIENT MESSAGE (OUTPATIENT)
Dept: UROLOGY | Facility: CLINIC | Age: 79
End: 2024-06-10
Payer: MEDICARE

## 2024-06-12 ENCOUNTER — CLINICAL SUPPORT (OUTPATIENT)
Dept: UROLOGY | Facility: CLINIC | Age: 79
End: 2024-06-12
Payer: MEDICARE

## 2024-06-12 DIAGNOSIS — E29.1 MALE HYPOGONADISM: Primary | ICD-10-CM

## 2024-06-12 PROCEDURE — 99212 OFFICE O/P EST SF 10 MIN: CPT | Mod: PBBFAC,PO,25

## 2024-06-12 PROCEDURE — 99999 PR PBB SHADOW E&M-EST. PATIENT-LVL II: CPT | Mod: PBBFAC,,,

## 2024-06-12 PROCEDURE — 99999PBSHW PR PBB SHADOW TECHNICAL ONLY FILED TO HB: Mod: PBBFAC,,,

## 2024-06-12 PROCEDURE — 96372 THER/PROPH/DIAG INJ SC/IM: CPT | Mod: PBBFAC,PO

## 2024-06-12 RX ADMIN — TESTOSTERONE CYPIONATE 150 MG: 200 INJECTION INTRAMUSCULAR at 09:06

## 2024-06-26 ENCOUNTER — CLINICAL SUPPORT (OUTPATIENT)
Dept: UROLOGY | Facility: CLINIC | Age: 79
End: 2024-06-26
Payer: MEDICARE

## 2024-06-26 DIAGNOSIS — E29.1 HYPOGONADISM, MALE: Primary | ICD-10-CM

## 2024-06-26 PROCEDURE — 96372 THER/PROPH/DIAG INJ SC/IM: CPT | Mod: PBBFAC,PO

## 2024-06-26 PROCEDURE — 99999 PR PBB SHADOW E&M-EST. PATIENT-LVL II: CPT | Mod: PBBFAC,,,

## 2024-06-26 PROCEDURE — 99212 OFFICE O/P EST SF 10 MIN: CPT | Mod: PBBFAC,PO

## 2024-06-26 PROCEDURE — 99999PBSHW PR PBB SHADOW TECHNICAL ONLY FILED TO HB: Mod: PBBFAC,,,

## 2024-06-26 RX ADMIN — TESTOSTERONE CYPIONATE 150 MG: 200 INJECTION INTRAMUSCULAR at 09:06

## 2024-06-28 ENCOUNTER — TELEPHONE (OUTPATIENT)
Dept: UROLOGY | Facility: CLINIC | Age: 79
End: 2024-06-28
Payer: MEDICARE

## 2024-06-28 ENCOUNTER — PATIENT MESSAGE (OUTPATIENT)
Dept: UROLOGY | Facility: CLINIC | Age: 79
End: 2024-06-28
Payer: MEDICARE

## 2024-07-08 ENCOUNTER — CLINICAL SUPPORT (OUTPATIENT)
Dept: UROLOGY | Facility: CLINIC | Age: 79
End: 2024-07-08
Payer: MEDICARE

## 2024-07-08 ENCOUNTER — LAB VISIT (OUTPATIENT)
Dept: LAB | Facility: HOSPITAL | Age: 79
End: 2024-07-08
Attending: NURSE PRACTITIONER
Payer: MEDICARE

## 2024-07-08 DIAGNOSIS — I10 ESSENTIAL HYPERTENSION, BENIGN: ICD-10-CM

## 2024-07-08 DIAGNOSIS — N18.31 CHRONIC KIDNEY DISEASE, STAGE 3A: ICD-10-CM

## 2024-07-08 DIAGNOSIS — E29.1 HYPOGONADISM, MALE: Primary | ICD-10-CM

## 2024-07-08 DIAGNOSIS — N28.1 SIMPLE RENAL CYST: ICD-10-CM

## 2024-07-08 DIAGNOSIS — E78.2 MIXED HYPERLIPIDEMIA: ICD-10-CM

## 2024-07-08 DIAGNOSIS — J45.20 MILD INTERMITTENT INTRINSIC ASTHMA WITHOUT STATUS ASTHMATICUS WITHOUT COMPLICATION: ICD-10-CM

## 2024-07-08 LAB
ALBUMIN SERPL BCP-MCNC: 3.8 G/DL (ref 3.5–5.2)
ALP SERPL-CCNC: 52 U/L (ref 55–135)
ALT SERPL W/O P-5'-P-CCNC: 6 U/L (ref 10–44)
ANION GAP SERPL CALC-SCNC: 9 MMOL/L (ref 8–16)
AST SERPL-CCNC: 30 U/L (ref 10–40)
BASOPHILS # BLD AUTO: 0.09 K/UL (ref 0–0.2)
BASOPHILS NFR BLD: 1.6 % (ref 0–1.9)
BILIRUB SERPL-MCNC: 0.8 MG/DL (ref 0.1–1)
BUN SERPL-MCNC: 26 MG/DL (ref 8–23)
CALCIUM SERPL-MCNC: 9.1 MG/DL (ref 8.7–10.5)
CHLORIDE SERPL-SCNC: 109 MMOL/L (ref 95–110)
CHOLEST SERPL-MCNC: 125 MG/DL (ref 120–199)
CHOLEST/HDLC SERPL: 1.8 {RATIO} (ref 2–5)
CO2 SERPL-SCNC: 22 MMOL/L (ref 23–29)
CREAT SERPL-MCNC: 1.2 MG/DL (ref 0.5–1.4)
DIFFERENTIAL METHOD BLD: ABNORMAL
EOSINOPHIL # BLD AUTO: 0.2 K/UL (ref 0–0.5)
EOSINOPHIL NFR BLD: 2.7 % (ref 0–8)
ERYTHROCYTE [DISTWIDTH] IN BLOOD BY AUTOMATED COUNT: 14.2 % (ref 11.5–14.5)
EST. GFR  (NO RACE VARIABLE): >60 ML/MIN/1.73 M^2
GLUCOSE SERPL-MCNC: 98 MG/DL (ref 70–110)
HCT VFR BLD AUTO: 41.5 % (ref 40–54)
HDLC SERPL-MCNC: 69 MG/DL (ref 40–75)
HDLC SERPL: 55.2 % (ref 20–50)
HGB BLD-MCNC: 13.5 G/DL (ref 14–18)
IMM GRANULOCYTES # BLD AUTO: 0.02 K/UL (ref 0–0.04)
IMM GRANULOCYTES NFR BLD AUTO: 0.4 % (ref 0–0.5)
LDLC SERPL CALC-MCNC: 50.4 MG/DL (ref 63–159)
LYMPHOCYTES # BLD AUTO: 1.4 K/UL (ref 1–4.8)
LYMPHOCYTES NFR BLD: 24.2 % (ref 18–48)
MCH RBC QN AUTO: 29.6 PG (ref 27–31)
MCHC RBC AUTO-ENTMCNC: 32.5 G/DL (ref 32–36)
MCV RBC AUTO: 91 FL (ref 82–98)
MONOCYTES # BLD AUTO: 0.8 K/UL (ref 0.3–1)
MONOCYTES NFR BLD: 13.8 % (ref 4–15)
NEUTROPHILS # BLD AUTO: 3.3 K/UL (ref 1.8–7.7)
NEUTROPHILS NFR BLD: 57.3 % (ref 38–73)
NONHDLC SERPL-MCNC: 56 MG/DL
NRBC BLD-RTO: 0 /100 WBC
PLATELET # BLD AUTO: 218 K/UL (ref 150–450)
PMV BLD AUTO: 11.3 FL (ref 9.2–12.9)
POTASSIUM SERPL-SCNC: 4.5 MMOL/L (ref 3.5–5.1)
PROT SERPL-MCNC: 6.9 G/DL (ref 6–8.4)
RBC # BLD AUTO: 4.56 M/UL (ref 4.6–6.2)
SODIUM SERPL-SCNC: 140 MMOL/L (ref 136–145)
TRIGL SERPL-MCNC: 28 MG/DL (ref 30–150)
TSH SERPL DL<=0.005 MIU/L-ACNC: 1.97 UIU/ML (ref 0.4–4)
WBC # BLD AUTO: 5.66 K/UL (ref 3.9–12.7)

## 2024-07-08 PROCEDURE — 85025 COMPLETE CBC W/AUTO DIFF WBC: CPT | Performed by: NURSE PRACTITIONER

## 2024-07-08 PROCEDURE — 80061 LIPID PANEL: CPT | Performed by: NURSE PRACTITIONER

## 2024-07-08 PROCEDURE — 36415 COLL VENOUS BLD VENIPUNCTURE: CPT | Mod: PO | Performed by: NURSE PRACTITIONER

## 2024-07-08 PROCEDURE — 99999PBSHW PR PBB SHADOW TECHNICAL ONLY FILED TO HB: Mod: PBBFAC,,,

## 2024-07-08 PROCEDURE — 99999 PR PBB SHADOW E&M-EST. PATIENT-LVL II: CPT | Mod: PBBFAC,,,

## 2024-07-08 PROCEDURE — 84443 ASSAY THYROID STIM HORMONE: CPT | Performed by: NURSE PRACTITIONER

## 2024-07-08 PROCEDURE — 80053 COMPREHEN METABOLIC PANEL: CPT | Performed by: NURSE PRACTITIONER

## 2024-07-08 PROCEDURE — 96372 THER/PROPH/DIAG INJ SC/IM: CPT | Mod: PBBFAC,PO

## 2024-07-08 PROCEDURE — 99212 OFFICE O/P EST SF 10 MIN: CPT | Mod: PBBFAC,PO,25

## 2024-07-08 RX ORDER — TESTOSTERONE CYPIONATE 200 MG/ML
150 INJECTION, SOLUTION INTRAMUSCULAR
Status: SHIPPED | OUTPATIENT
Start: 2024-07-08 | End: 2024-09-30

## 2024-07-08 RX ADMIN — TESTOSTERONE CYPIONATE 150 MG: 200 INJECTION, SOLUTION INTRAMUSCULAR at 09:07

## 2024-07-24 ENCOUNTER — CLINICAL SUPPORT (OUTPATIENT)
Dept: UROLOGY | Facility: CLINIC | Age: 79
End: 2024-07-24
Payer: MEDICARE

## 2024-07-24 DIAGNOSIS — E29.1 MALE HYPOGONADISM: Primary | ICD-10-CM

## 2024-07-24 PROCEDURE — 99213 OFFICE O/P EST LOW 20 MIN: CPT | Mod: PBBFAC,PO

## 2024-07-24 PROCEDURE — 99999PBSHW PR PBB SHADOW TECHNICAL ONLY FILED TO HB: Mod: PBBFAC,,,

## 2024-07-24 PROCEDURE — 96372 THER/PROPH/DIAG INJ SC/IM: CPT | Mod: PBBFAC,PO

## 2024-07-24 PROCEDURE — 99999 PR PBB SHADOW E&M-EST. PATIENT-LVL III: CPT | Mod: PBBFAC,,,

## 2024-07-24 RX ADMIN — TESTOSTERONE CYPIONATE 150 MG: 200 INJECTION, SOLUTION INTRAMUSCULAR at 09:07

## 2024-08-07 ENCOUNTER — CLINICAL SUPPORT (OUTPATIENT)
Dept: UROLOGY | Facility: CLINIC | Age: 79
End: 2024-08-07
Payer: MEDICARE

## 2024-08-07 ENCOUNTER — PATIENT MESSAGE (OUTPATIENT)
Dept: UROLOGY | Facility: CLINIC | Age: 79
End: 2024-08-07

## 2024-08-07 DIAGNOSIS — E29.1 HYPOGONADISM, MALE: Primary | ICD-10-CM

## 2024-08-07 PROCEDURE — 99999 PR PBB SHADOW E&M-EST. PATIENT-LVL II: CPT | Mod: PBBFAC,,,

## 2024-08-07 PROCEDURE — 99999PBSHW PR PBB SHADOW TECHNICAL ONLY FILED TO HB: Mod: PBBFAC,,,

## 2024-08-07 PROCEDURE — 96372 THER/PROPH/DIAG INJ SC/IM: CPT | Mod: PBBFAC,PO

## 2024-08-07 PROCEDURE — 99212 OFFICE O/P EST SF 10 MIN: CPT | Mod: PBBFAC,PO,25

## 2024-08-07 RX ADMIN — TESTOSTERONE CYPIONATE 150 MG: 200 INJECTION, SOLUTION INTRAMUSCULAR at 09:08

## 2024-08-21 ENCOUNTER — CLINICAL SUPPORT (OUTPATIENT)
Dept: UROLOGY | Facility: CLINIC | Age: 79
End: 2024-08-21
Payer: MEDICARE

## 2024-08-21 DIAGNOSIS — E29.1 HYPOGONADISM, MALE: Primary | ICD-10-CM

## 2024-08-21 PROCEDURE — 96372 THER/PROPH/DIAG INJ SC/IM: CPT | Mod: PBBFAC,PO

## 2024-08-21 PROCEDURE — 99999PBSHW PR PBB SHADOW TECHNICAL ONLY FILED TO HB: Mod: PBBFAC,,,

## 2024-08-21 PROCEDURE — 99999 PR PBB SHADOW E&M-EST. PATIENT-LVL III: CPT | Mod: PBBFAC,,,

## 2024-08-21 PROCEDURE — 99213 OFFICE O/P EST LOW 20 MIN: CPT | Mod: PBBFAC,PO

## 2024-08-21 RX ADMIN — TESTOSTERONE CYPIONATE 150 MG: 200 INJECTION, SOLUTION INTRAMUSCULAR at 02:08

## 2024-08-21 NOTE — PROGRESS NOTES
Testosterone injection given, see MAR. Pt tolerated well, recommended to wait twenty minutes after injection to observe for adverse reaction. Pt declined to wait.

## 2024-08-22 ENCOUNTER — OFFICE VISIT (OUTPATIENT)
Facility: CLINIC | Age: 79
End: 2024-08-22
Payer: MEDICARE

## 2024-08-22 VITALS — HEIGHT: 66 IN | BODY MASS INDEX: 24.43 KG/M2 | WEIGHT: 152 LBS

## 2024-08-22 DIAGNOSIS — R93.2 ABNORMAL FINDING ON IMAGING OF LIVER: ICD-10-CM

## 2024-08-22 DIAGNOSIS — R17 ELEVATED BILIRUBIN: Primary | ICD-10-CM

## 2024-08-22 PROCEDURE — 99999 PR PBB SHADOW E&M-EST. PATIENT-LVL III: CPT | Mod: PBBFAC,,, | Performed by: NURSE PRACTITIONER

## 2024-08-22 PROCEDURE — 99203 OFFICE O/P NEW LOW 30 MIN: CPT | Mod: S$PBB,,, | Performed by: NURSE PRACTITIONER

## 2024-08-22 PROCEDURE — 99213 OFFICE O/P EST LOW 20 MIN: CPT | Mod: PBBFAC,PN | Performed by: NURSE PRACTITIONER

## 2024-08-22 NOTE — PROGRESS NOTES
[facility] - New Patient     REFERRING PROVIDER: CONG Day  PCP: Emily Arias MD    Chief Complaint: Abnormal liver imaging, elevated bilirubin        HISTORY     This is a 78 y.o. male referred for evaluation of above.    Denies prior diagnosis of liver disease.     Review of labs:  - Transaminases: WNL  - Alk phos: low  - Synthetic liver function: TBili currently normal though mildly elevated from 4/2022-1/2024 (<2)  - Platelets: WNL    Workup thus far:  Serologies:   Lab Results   Component Value Date    HEPBSAG Non-reactive 09/01/2023    HEPBIGM Non-reactive 09/01/2023    HEPCAB Non-reactive 09/01/2023    HEPAIGM Non-reactive 09/01/2023      Abd US 9/8/23- liver normal size with homogeneous echotexture, calcifications in liver suggesting prior granulomatous disease, no biliary dilation, spleen WNL; no imaging findings to suggest advanced fibrosis     He notes a h/o EBV many years ago.     Risk factors for fatty liver:   Weight -- Body mass index is 24.53 kg/m².                        Dyslipidemia -- yes                                 Insulin resistance / diabetes -- no        Hypertension -- yes  Alcohol use -- 1-2 drinks most nights/week   Other -- MELCHOR, hypogonadism     Family history:  No family history of liver disease, cirrhosis, or liver cancer.    Meds/supplements:  -Rx: testosterone   -OTC, herbs/vitamins: none  No recent medication changes.     Social history:  Occupation: retired  Exercise: not strenuous; no myalgias   Lives with: spouse  Denies illicit drug use.  Denies recent illness or infection.    No symptoms of hepatic decompensation including jaundice, ascites, cognitive problems to suggest hepatic encephalopathy, or GI bleeding.          Past medical history, surgical history, problem list, family history, social history, allergies: Reviewed and updated in the appropriate section of the electronic medical record.      Current Outpatient Medications   Medication  Sig Dispense Refill    amLODIPine (NORVASC) 5 MG tablet Take 2 tablets (10 mg total) by mouth every evening. 60 tablet 1    aspirin (ECOTRIN) 81 MG EC tablet Take 1 tablet by mouth once daily.      carbidopa-levodopa  mg (SINEMET)  mg per tablet Take 1 tablet by mouth 3 (three) times daily.      cetirizine (ZYRTEC) 5 MG tablet Take 5 mg by mouth once daily.      irbesartan (AVAPRO) 150 MG tablet Take 1 tablet (150 mg total) by mouth once daily. 90 tablet 3    meloxicam (MOBIC) 15 MG tablet TAKE 1 TABLET(15 MG) BY MOUTH EVERY DAY 30 tablet 11    multivitamin (THERAGRAN) per tablet Take 1 tablet by mouth once daily.      pantoprazole (PROTONIX) 40 MG tablet Take 1 tablet (40 mg total) by mouth every morning. 90 tablet 3    rosuvastatin (CRESTOR) 5 MG tablet Take 5 mg by mouth every evening.       Current Facility-Administered Medications   Medication Dose Route Frequency Provider Last Rate Last Admin    testosterone cypionate injection 150 mg  150 mg Intramuscular Q14 Days BIBI Fonseca MD   150 mg at 08/21/24 1404     Medication list reviewed and updated.      Review of Systems - as per HPI  Constitutional: Negative for unexpected weight change.   Respiratory: Negative for shortness of breath.    Cardiovascular: Negative for leg swelling.  Gastrointestinal: Negative for abdominal distention or abdominal pain. Negative for melena or hematemesis.  Musculoskeletal: Negative for myalgias.    Skin: Negative for jaundice or itching.  Neurological: Negative for confusion or slowed mentation. Negative for tremors.   Hematological: Does not bruise/bleed easily.       Physical Exam   Constitutional: No distress. Alert and oriented.  Eyes: No scleral icterus.   Pulmonary/Chest: Respiratory effort normal. No respiratory distress.   Abdominal: No distension, no ascites appreciated.   Extremities: No edema.   Neurological: No tremor.   Skin: No jaundice.   Psychiatric: Normal mood and affect. Speech, behavior, and  "thought content normal.       LABS & DIAGNOSTIC STUDIES     I have personally reviewed pertinent laboratory findings:    Lab Results   Component Value Date    ALT 6 (L) 07/08/2024    AST 30 07/08/2024    ALKPHOS 52 (L) 07/08/2024    BILITOT 0.8 07/08/2024    ALBUMIN 3.8 07/08/2024       Lab Results   Component Value Date    WBC 5.66 07/08/2024    HGB 13.5 (L) 07/08/2024    HCT 41.5 07/08/2024    MCV 91 07/08/2024     07/08/2024       Lab Results   Component Value Date     07/08/2024    K 4.5 07/08/2024    BUN 26 (H) 07/08/2024    CREATININE 1.2 07/08/2024    EGFRNONAA 56 (L) 10/13/2021       Lab Results   Component Value Date    HEPBSAG Non-reactive 09/01/2023    HEPCAB Non-reactive 09/01/2023       No results found for: "AFP"      I have personally reviewed the following result reports:  Abdominal US - 9/8/23      ASSESSMENT & PLAN     78 y.o. male with:    1. Abnormal finding on imaging of liver    2. Elevated bilirubin        Recommendations:  Bilirubin currently normal though discussed that he could have Gilbert's given mild, chronic elevation in previous years. Other liver tests are normal. Insurance not approving [id number] genotype for confirmation.  Will obtain Fibroscan for fibrosis/steatosis staging as he has risk factors for fatty liver  No concerning liver lesions or biliary dilation on US though will repeat this before end of year given mention of calcifications; likely due to prior infection/inflammation.       Orders Placed This Encounter   Procedures    FibroScan Transplant Hepatology(Vibration Controlled Transient Elastography)    US Abdomen Limited       Return to clinic pending results.       Thank you for allowing me to participate in the care of Mohit Amaral Jennie Barry, AYSHAP-C  Hepatology        Duration of encounter: 30 min  This includes face to face time and non-face to face time preparing to see the patient (eg, review of tests), obtaining and/or reviewing " separately obtained history, documenting clinical information in the electronic or other health record, independently interpreting results and communicating results to the patient/family/caregiver, or Care coordination.

## 2024-08-22 NOTE — PATIENT INSTRUCTIONS
Elevated bilirubin may be due to Gilbert's  Will plan for Fibroscan to make sure no sign of fatty liver or liver fibrosis/damage   Repeat ultrasound any time before end of year

## 2024-08-28 ENCOUNTER — PATIENT MESSAGE (OUTPATIENT)
Facility: CLINIC | Age: 79
End: 2024-08-28

## 2024-08-28 ENCOUNTER — PROCEDURE VISIT (OUTPATIENT)
Facility: CLINIC | Age: 79
End: 2024-08-28
Payer: MEDICARE

## 2024-08-28 DIAGNOSIS — R93.2 ABNORMAL FINDING ON IMAGING OF LIVER: ICD-10-CM

## 2024-08-28 DIAGNOSIS — R17 ELEVATED BILIRUBIN: ICD-10-CM

## 2024-08-28 PROCEDURE — 91200 LIVER ELASTOGRAPHY: CPT | Mod: PBBFAC,PN | Performed by: NURSE PRACTITIONER

## 2024-08-28 PROCEDURE — 91200 LIVER ELASTOGRAPHY: CPT | Mod: 26,S$PBB,, | Performed by: NURSE PRACTITIONER

## 2024-08-28 NOTE — PROGRESS NOTES
Detail Level: Simple
Testosterone 150 mg injection given, see MAR. Pt tolerated well, recommended to wait twenty minutes after injection to observe for adverse reaction. Pt declined to wait.       
Detail Level: Generalized

## 2024-08-28 NOTE — PROCEDURES
FibroScan Transplant Hepatology(Vibration Controlled Transient Elastography)    Date/Time: 8/28/2024 1:30 PM    Performed by: Sakina Barry NP  Authorized by: Sakina Barry NP    Diagnosis:  Other    Probe:  M    Universal Protocol: Patient's identity, procedure and site were verified, confirmatory pause was performed.  Discussed procedure including risks and potential complications.  Questions answered.  Patient verbalizes understanding and wishes to proceed with VCTE.     Procedure: After providing explanations of the procedure, patient was placed in the supine position with right arm in maximum abduction to allow optimal exposure of right lateral abdomen.  Patient was briefly assessed, Testing was performed in the mid-axillary location, 50Hz Shear Wave pulses were applied and the resulting Shear Wave and Propagation Speed detected with a 3.5 MHz ultrasonic signal, using the FibroScan probe, Skin to liver capsule distance and liver parenchyma were accessed during the entire examination with the FibroScan probe, Patient was instructed to breathe normally and to abstain from sudden movements during the procedure, allowing for random measurements of liver stiffness. At least 10 Shear Waves were produced, Individual measurements of each Shear Wave were calculated.  Patient tolerated the procedure well with no complications.  Meets discharge criteria as was dismissed.  Rates pain 0 out of 10.  Patient will follow up with ordering provider to review results.    Findings  Median liver stiffness score:  4.4  CAP Reading: dB/m:  244    IQR/med %:  11  Interpretation  Fibrosis interpretation is based on medial liver stiffness - Kilopascal (kPa).    Fibrosis Stage:  F 0-1  Steatosis interpretation is based on controlled attenuation parameter - (dB/m).    Steatosis Grade:  <S1

## 2024-09-04 ENCOUNTER — CLINICAL SUPPORT (OUTPATIENT)
Dept: UROLOGY | Facility: CLINIC | Age: 79
End: 2024-09-04
Payer: MEDICARE

## 2024-09-04 DIAGNOSIS — E29.1 HYPOGONADISM, MALE: Primary | ICD-10-CM

## 2024-09-04 PROCEDURE — 96372 THER/PROPH/DIAG INJ SC/IM: CPT | Mod: PBBFAC,PO

## 2024-09-04 PROCEDURE — 99999 PR PBB SHADOW E&M-EST. PATIENT-LVL II: CPT | Mod: PBBFAC,,,

## 2024-09-04 PROCEDURE — 99999PBSHW PR PBB SHADOW TECHNICAL ONLY FILED TO HB: Mod: PBBFAC,,,

## 2024-09-04 PROCEDURE — 99212 OFFICE O/P EST SF 10 MIN: CPT | Mod: PBBFAC,PO,25

## 2024-09-04 RX ADMIN — TESTOSTERONE CYPIONATE 150 MG: 200 INJECTION, SOLUTION INTRAMUSCULAR at 02:09

## 2024-09-18 ENCOUNTER — CLINICAL SUPPORT (OUTPATIENT)
Dept: UROLOGY | Facility: CLINIC | Age: 79
End: 2024-09-18
Payer: MEDICARE

## 2024-09-18 DIAGNOSIS — E29.1 HYPOGONADISM, MALE: Primary | ICD-10-CM

## 2024-09-18 PROCEDURE — 99213 OFFICE O/P EST LOW 20 MIN: CPT | Mod: PBBFAC,PO

## 2024-09-18 PROCEDURE — 99999PBSHW PR PBB SHADOW TECHNICAL ONLY FILED TO HB: Mod: PBBFAC,,,

## 2024-09-18 PROCEDURE — 96372 THER/PROPH/DIAG INJ SC/IM: CPT | Mod: PBBFAC,PO

## 2024-09-18 PROCEDURE — 99999 PR PBB SHADOW E&M-EST. PATIENT-LVL III: CPT | Mod: PBBFAC,,,

## 2024-09-18 RX ADMIN — TESTOSTERONE CYPIONATE 150 MG: 200 INJECTION, SOLUTION INTRAMUSCULAR at 02:09

## 2024-10-02 ENCOUNTER — CLINICAL SUPPORT (OUTPATIENT)
Dept: UROLOGY | Facility: CLINIC | Age: 79
End: 2024-10-02
Payer: MEDICARE

## 2024-10-02 DIAGNOSIS — E29.1 HYPOGONADISM, MALE: Primary | ICD-10-CM

## 2024-10-02 PROCEDURE — 96372 THER/PROPH/DIAG INJ SC/IM: CPT | Mod: PBBFAC,PO

## 2024-10-02 PROCEDURE — 99212 OFFICE O/P EST SF 10 MIN: CPT | Mod: PBBFAC,PO,25

## 2024-10-02 PROCEDURE — 99999 PR PBB SHADOW E&M-EST. PATIENT-LVL II: CPT | Mod: PBBFAC,,,

## 2024-10-02 PROCEDURE — 99999PBSHW PR PBB SHADOW TECHNICAL ONLY FILED TO HB: Mod: PBBFAC,,,

## 2024-10-02 RX ORDER — TESTOSTERONE CYPIONATE 200 MG/ML
150 INJECTION, SOLUTION INTRAMUSCULAR
Status: SHIPPED | OUTPATIENT
Start: 2024-10-02 | End: 2024-12-25

## 2024-10-02 RX ADMIN — TESTOSTERONE CYPIONATE 150 MG: 200 INJECTION INTRAMUSCULAR at 02:10

## 2024-10-16 ENCOUNTER — CLINICAL SUPPORT (OUTPATIENT)
Dept: UROLOGY | Facility: CLINIC | Age: 79
End: 2024-10-16
Payer: MEDICARE

## 2024-10-16 DIAGNOSIS — R79.89 LOW TESTOSTERONE LEVEL IN MALE: Primary | ICD-10-CM

## 2024-10-16 PROCEDURE — 96372 THER/PROPH/DIAG INJ SC/IM: CPT | Mod: PBBFAC,PO

## 2024-10-16 PROCEDURE — 99212 OFFICE O/P EST SF 10 MIN: CPT | Mod: PBBFAC,PO

## 2024-10-16 PROCEDURE — 99999PBSHW PR PBB SHADOW TECHNICAL ONLY FILED TO HB: Mod: PBBFAC,,,

## 2024-10-16 PROCEDURE — 99999 PR PBB SHADOW E&M-EST. PATIENT-LVL II: CPT | Mod: PBBFAC,,,

## 2024-10-16 RX ADMIN — TESTOSTERONE CYPIONATE 150 MG: 200 INJECTION INTRAMUSCULAR at 02:10

## 2024-10-16 NOTE — PROGRESS NOTES
Injection given, see MAR. Pt tolerated well and was recommend to wait 20 min after injection to observe for adverse reactions. Pt declined to wait.

## 2024-11-05 ENCOUNTER — CLINICAL SUPPORT (OUTPATIENT)
Dept: UROLOGY | Facility: CLINIC | Age: 79
End: 2024-11-05
Payer: MEDICARE

## 2024-11-05 VITALS — HEIGHT: 66 IN | BODY MASS INDEX: 24.53 KG/M2

## 2024-11-05 DIAGNOSIS — R79.89 LOW TESTOSTERONE LEVEL IN MALE: Primary | ICD-10-CM

## 2024-11-05 PROCEDURE — 96372 THER/PROPH/DIAG INJ SC/IM: CPT | Mod: PBBFAC,PO

## 2024-11-05 PROCEDURE — 99999 PR PBB SHADOW E&M-EST. PATIENT-LVL III: CPT | Mod: PBBFAC,,,

## 2024-11-05 PROCEDURE — 99999PBSHW PR PBB SHADOW TECHNICAL ONLY FILED TO HB: Mod: PBBFAC,,,

## 2024-11-05 PROCEDURE — 99213 OFFICE O/P EST LOW 20 MIN: CPT | Mod: PBBFAC,PO

## 2024-11-05 RX ADMIN — TESTOSTERONE CYPIONATE 150 MG: 200 INJECTION INTRAMUSCULAR at 02:11

## 2024-11-17 ENCOUNTER — PATIENT MESSAGE (OUTPATIENT)
Dept: UROLOGY | Facility: CLINIC | Age: 79
End: 2024-11-17
Payer: MEDICARE

## 2024-11-19 ENCOUNTER — CLINICAL SUPPORT (OUTPATIENT)
Dept: UROLOGY | Facility: CLINIC | Age: 79
End: 2024-11-19
Payer: MEDICARE

## 2024-11-19 DIAGNOSIS — R79.89 LOW TESTOSTERONE LEVEL IN MALE: Primary | ICD-10-CM

## 2024-11-19 PROCEDURE — 99999PBSHW PR PBB SHADOW TECHNICAL ONLY FILED TO HB: Mod: PBBFAC,,,

## 2024-11-19 PROCEDURE — 99999 PR PBB SHADOW E&M-EST. PATIENT-LVL II: CPT | Mod: PBBFAC,,,

## 2024-11-19 PROCEDURE — 99212 OFFICE O/P EST SF 10 MIN: CPT | Mod: PBBFAC,PO

## 2024-11-19 PROCEDURE — 96372 THER/PROPH/DIAG INJ SC/IM: CPT | Mod: PBBFAC,PO

## 2024-11-19 RX ADMIN — TESTOSTERONE CYPIONATE 150 MG: 200 INJECTION INTRAMUSCULAR at 01:11

## 2024-12-03 ENCOUNTER — CLINICAL SUPPORT (OUTPATIENT)
Dept: UROLOGY | Facility: CLINIC | Age: 79
End: 2024-12-03
Payer: MEDICARE

## 2024-12-03 DIAGNOSIS — R79.89 LOW TESTOSTERONE LEVEL IN MALE: Primary | ICD-10-CM

## 2024-12-03 PROCEDURE — 99212 OFFICE O/P EST SF 10 MIN: CPT | Mod: PBBFAC,PO

## 2024-12-03 PROCEDURE — 99999 PR PBB SHADOW E&M-EST. PATIENT-LVL II: CPT | Mod: PBBFAC,,,

## 2024-12-03 PROCEDURE — 96372 THER/PROPH/DIAG INJ SC/IM: CPT | Mod: PBBFAC,PO

## 2024-12-03 PROCEDURE — 99999PBSHW PR PBB SHADOW TECHNICAL ONLY FILED TO HB: Mod: PBBFAC,,,

## 2024-12-03 RX ADMIN — TESTOSTERONE CYPIONATE 150 MG: 200 INJECTION INTRAMUSCULAR at 01:12

## 2024-12-06 ENCOUNTER — HOSPITAL ENCOUNTER (OUTPATIENT)
Dept: RADIOLOGY | Facility: HOSPITAL | Age: 79
Discharge: HOME | End: 2024-12-06
Attending: NURSE PRACTITIONER
Payer: MEDICARE

## 2024-12-06 DIAGNOSIS — R93.2 ABNORMAL FINDING ON IMAGING OF LIVER: ICD-10-CM

## 2024-12-06 PROCEDURE — 76705 ECHO EXAM OF ABDOMEN: CPT | Mod: TC,PO

## 2024-12-06 PROCEDURE — 76705 ECHO EXAM OF ABDOMEN: CPT | Mod: 26,,, | Performed by: RADIOLOGY

## 2024-12-09 DIAGNOSIS — M25.551 RIGHT HIP PAIN: Primary | ICD-10-CM

## 2024-12-10 ENCOUNTER — HOSPITAL ENCOUNTER (OUTPATIENT)
Dept: RADIOLOGY | Facility: HOSPITAL | Age: 79
Discharge: HOME | End: 2024-12-10
Attending: ORTHOPAEDIC SURGERY
Payer: MEDICARE

## 2024-12-10 ENCOUNTER — OFFICE VISIT (OUTPATIENT)
Dept: ORTHOPEDICS | Facility: CLINIC | Age: 79
End: 2024-12-10
Payer: MEDICARE

## 2024-12-10 VITALS — BODY MASS INDEX: 24.41 KG/M2 | WEIGHT: 151.88 LBS | HEIGHT: 66 IN

## 2024-12-10 DIAGNOSIS — M54.16 RIGHT LUMBAR RADICULOPATHY: Primary | ICD-10-CM

## 2024-12-10 DIAGNOSIS — M54.9 DORSALGIA, UNSPECIFIED: ICD-10-CM

## 2024-12-10 DIAGNOSIS — M25.551 RIGHT HIP PAIN: ICD-10-CM

## 2024-12-10 PROCEDURE — 73502 X-RAY EXAM HIP UNI 2-3 VIEWS: CPT | Mod: TC,PO,RT

## 2024-12-10 PROCEDURE — 73502 X-RAY EXAM HIP UNI 2-3 VIEWS: CPT | Mod: 26,RT,, | Performed by: RADIOLOGY

## 2024-12-10 PROCEDURE — 99213 OFFICE O/P EST LOW 20 MIN: CPT | Mod: PBBFAC,25,PO | Performed by: ORTHOPAEDIC SURGERY

## 2024-12-10 PROCEDURE — 99999 PR PBB SHADOW E&M-EST. PATIENT-LVL III: CPT | Mod: PBBFAC,,, | Performed by: ORTHOPAEDIC SURGERY

## 2024-12-10 PROCEDURE — 99214 OFFICE O/P EST MOD 30 MIN: CPT | Mod: S$PBB,,, | Performed by: ORTHOPAEDIC SURGERY

## 2024-12-10 RX ORDER — DIAZEPAM 10 MG/1
10 TABLET ORAL
Qty: 2 TABLET | Refills: 0 | Status: SHIPPED | OUTPATIENT
Start: 2024-12-10 | End: 2025-01-09

## 2024-12-16 NOTE — PROGRESS NOTES
Chief Complaint   Patient presents with    Right Hip - Pain    Hip Pain       HPI:    This is a 79 y.o. who presents today complaining of right thigh pain for 2 months after no interval trauma. He is s/p CARI 4 years ago. Pain is throbbing. No other numbness or tingling. No associated signs or symptoms.      Past Medical History:   Diagnosis Date    Allergy     Arthritis     Hypertension     Sleep apnea     USES C-PAP      Past Surgical History:   Procedure Laterality Date    ARTHROPLASTY OF HIP BY ANTERIOR APPROACH Right 2/15/2021    Procedure: ARTHROPLASTY, HIP, ANTERIOR APPROACH;  Surgeon: Maynor Tolentino MD;  Location: [facility];  Service: Orthopedics;  Laterality: Right;    CIRCUMCISION      EYE SURGERY Left     age 6    HERNIA REPAIR Left     TONSILLECTOMY        Current Outpatient Medications on File Prior to Visit   Medication Sig Dispense Refill    aspirin (ECOTRIN) 81 MG EC tablet Take 1 tablet by mouth once daily.      carbidopa-levodopa  mg (SINEMET)  mg per tablet Take 1 tablet by mouth 3 (three) times daily.      cetirizine (ZYRTEC) 5 MG tablet Take 5 mg by mouth once daily.      irbesartan (AVAPRO) 150 MG tablet Take 1 tablet (150 mg total) by mouth once daily. 90 tablet 0    meloxicam (MOBIC) 15 MG tablet TAKE 1 TABLET(15 MG) BY MOUTH EVERY DAY 30 tablet 11    multivitamin (THERAGRAN) per tablet Take 1 tablet by mouth once daily.      pantoprazole (PROTONIX) 40 MG tablet Take 1 tablet (40 mg total) by mouth every morning. 90 tablet 0    rosuvastatin (CRESTOR) 5 MG tablet Take 5 mg by mouth every evening.      amLODIPine (NORVASC) 5 MG tablet Take 2 tablets (10 mg total) by mouth every evening. 60 tablet 1     Current Facility-Administered Medications on File Prior to Visit   Medication Dose Route Frequency Provider Last Rate Last Admin    testosterone cypionate injection 150 mg  150 mg Intramuscular Q14 Days BIBI Fonseca MD   150 mg at 12/03/24 1315      Review of patient's allergies  indicates:   Allergen Reactions    Penicillins Other (See Comments)     As a child      Family History not pertinent   Social History     Socioeconomic History    Marital status:    Tobacco Use    Smoking status: Former     Current packs/day: 0.00     Types: Cigarettes     Quit date: 3/20/1985     Years since quittin.7    Smokeless tobacco: Never   Substance and Sexual Activity    Alcohol use: Yes     Alcohol/week: 14.0 standard drinks of alcohol     Types: 14 Glasses of wine per week    Drug use: No    Sexual activity: Yes     Partners: Female     Social Drivers of Health     Financial Resource Strain: Low Risk  (10/12/2024)    Received from [facility]    Overall Financial Resource Strain (CARDIA)     Difficulty of Paying Living Expenses: Not hard at all   Food Insecurity: No Food Insecurity (10/12/2024)    Received from [facility]    Hunger Vital Sign     Worried About Running Out of Food in the Last Year: Never true     Ran Out of Food in the Last Year: Never true   Transportation Needs: No Transportation Needs (10/12/2024)    Received from [facility]    PRAPARE - Transportation     Lack of Transportation (Medical): No     Lack of Transportation (Non-Medical): No   Physical Activity: Sufficiently Active (10/12/2024)    Received from [facility]    Exercise Vital Sign     Days of Exercise per Week: 3 days     Minutes of Exercise per Session: 60 min   Stress: No Stress Concern Present (10/12/2024)    Received from [facility]    Citizen of [place] [employer] - Occupational Stress Questionnaire     Feeling of Stress : Only a little   Housing Stability: Unknown (10/12/2024)    Received from [facility]    Housing Stability Vital Sign     Unable to Pay for Housing in the Last Year: No         Review of Systems:   Constitutional:  Denies fever or chills    Eyes:  Denies change in visual acuity    HENT:  Denies nasal congestion or sore throat    Respiratory:  Denies cough or shortness of breath     Cardiovascular:  Denies chest pain or edema    GI:  Denies abdominal pain, nausea, vomiting, bloody stools or diarrhea    :  Denies dysuria    Integument:  Denies rash    Neurologic:  Denies headache, focal weakness or sensory changes    Endocrine:  Denies polyuria or polydipsia    Lymphatic:  Denies swollen glands    Psychiatric:  Denies depression or anxiety       Physical Exam:    Constitutional:  Well developed, well nourished, no acute distress, non-toxic appearance    Integument:  Well hydrated, no rash    Lymphatic:  No lymphadenopathy noted    Neurologic:  Alert & oriented x 3,     Psychiatric:  Speech and behavior appropriate    Gi: abdomen soft  Eyes: EOMI   LLE  Exam performed same as contralateral side and is normal  RLE  +SLR noted. +long tract signs. Pain about the L2-3 nerve root distribution. Skin intact. Foot perfused.      X-rays were performed today, personally reviewed by me and findings discussed with the patient.   2 views of the right hip show implants intact in good position      Right lumbar radiculopathy  -     diazePAM (VALIUM) 10 MG Tab; Take 1 tablet (10 mg total) by mouth On call Procedure (MRI).  Dispense: 2 tablet; Refill: 0    Dorsalgia, unspecified  -     MRI Lumbar Spine Without Contrast; Future; Expected date: 12/10/2024        Will order MRI and have him back for the results.

## 2024-12-17 ENCOUNTER — PATIENT MESSAGE (OUTPATIENT)
Dept: ORTHOPEDICS | Facility: CLINIC | Age: 79
End: 2024-12-17
Payer: MEDICARE

## 2024-12-17 DIAGNOSIS — M54.9 DORSALGIA, UNSPECIFIED: Primary | ICD-10-CM

## 2024-12-17 DIAGNOSIS — M54.16 RIGHT LUMBAR RADICULOPATHY: ICD-10-CM

## 2024-12-18 ENCOUNTER — TELEPHONE (OUTPATIENT)
Dept: NEUROSURGERY | Facility: CLINIC | Age: 79
End: 2024-12-18
Payer: MEDICARE

## 2024-12-18 NOTE — TELEPHONE ENCOUNTER
Called patient in regards to referral placed to Neurosurgery/Janes Hernández MD. Updated imaging in chart. No answer. LVM to call [phone] for appointment scheduling

## 2024-12-23 ENCOUNTER — PATIENT MESSAGE (OUTPATIENT)
Dept: UROLOGY | Facility: CLINIC | Age: 79
End: 2024-12-23
Payer: MEDICARE

## 2024-12-26 ENCOUNTER — OFFICE VISIT (OUTPATIENT)
Dept: NEUROSURGERY | Facility: CLINIC | Age: 79
End: 2024-12-26
Payer: MEDICARE

## 2024-12-26 VITALS
HEART RATE: 81 BPM | SYSTOLIC BLOOD PRESSURE: 135 MMHG | DIASTOLIC BLOOD PRESSURE: 70 MMHG | RESPIRATION RATE: 18 BRPM | HEIGHT: 66 IN | BODY MASS INDEX: 24.41 KG/M2 | WEIGHT: 151.88 LBS

## 2024-12-26 DIAGNOSIS — M54.16 RIGHT LUMBAR RADICULOPATHY: ICD-10-CM

## 2024-12-26 DIAGNOSIS — M54.9 DORSALGIA, UNSPECIFIED: ICD-10-CM

## 2024-12-26 PROCEDURE — 99204 OFFICE O/P NEW MOD 45 MIN: CPT | Mod: S$PBB,,, | Performed by: STUDENT IN AN ORGANIZED HEALTH CARE EDUCATION/TRAINING PROGRAM

## 2024-12-26 NOTE — PROGRESS NOTES
Neurosurgery History & Physical    Patient ID: Mohit Schneider is a 79 y.o. male.    Chief Complaint   Patient presents with    [facility]     Image review     HPI:  79 year old male with hx of right CARI 4 years ago presents for discussion of lumbar imaging. Reports several months of right groin and anterior thigh pain without inciting trauma/injury. Denies back pain, pain in left leg or distal RLE. The pain is described as an ache, exacerbated laying flat. Denies numbness/tingling, weakness.     He saw Dr. Tolentino in Ortho who ordered hip XR and lumbar MRI, recommended NSGY evaluation. No formal PT but is extremely active in pilates and weight training. No medications for this or eval by pain management.       Review of Systems   Constitutional:  Negative for activity change.   Respiratory:  Negative for cough.    Gastrointestinal:  Negative for nausea and vomiting.   Genitourinary:  Negative for difficulty urinating.   Musculoskeletal:  Positive for myalgias. Negative for back pain.   Neurological:  Negative for weakness and numbness.   Psychiatric/Behavioral:  Negative for confusion.        Past Medical History:   Diagnosis Date    Allergy     Arthritis     Hypertension     Sleep apnea     USES C-PAP     Social History     Socioeconomic History    Marital status:    Tobacco Use    Smoking status: Former     Current packs/day: 0.00     Types: Cigarettes     Quit date: 3/20/1985     Years since quittin.7    Smokeless tobacco: Never   Substance and Sexual Activity    Alcohol use: Yes     Alcohol/week: 14.0 standard drinks of alcohol     Types: 14 Glasses of wine per week    Drug use: No    Sexual activity: Yes     Partners: Female     Social Drivers of Health     Financial Resource Strain: Low Risk  (2024)    Overall Financial Resource Strain (CARDIA)     Difficulty of Paying Living Expenses: Not hard at all   Food Insecurity: No Food Insecurity (2024)    Hunger Vital Sign      Worried About Running Out of Food in the Last Year: Never true     Ran Out of Food in the Last Year: Never true   Transportation Needs: No Transportation Needs (10/12/2024)    Received from [facility]    PRAPARE - Transportation     Lack of Transportation (Medical): No     Lack of Transportation (Non-Medical): No   Physical Activity: Sufficiently Active (12/24/2024)    Exercise Vital Sign     Days of Exercise per Week: 3 days     Minutes of Exercise per Session: 60 min   Stress: Stress Concern Present (12/24/2024)    [place] [employer] - Occupational Stress Questionnaire     Feeling of Stress : To some extent   Housing Stability: Unknown (10/12/2024)    Received from [facility]    Housing Stability Vital Sign     Unable to Pay for Housing in the Last Year: No     Family History   Problem Relation Name Age of Onset    Kidney disease Mother      Stroke Father      Hypertension Father       Review of patient's allergies indicates:   Allergen Reactions    Penicillins Other (See Comments)     As a child       Current Outpatient Medications:     aspirin (ECOTRIN) 81 MG EC tablet, Take 1 tablet by mouth once daily., Disp: , Rfl:     carbidopa-levodopa  mg (SINEMET)  mg per tablet, Take 1 tablet by mouth 3 (three) times daily., Disp: , Rfl:     cetirizine (ZYRTEC) 5 MG tablet, Take 5 mg by mouth once daily., Disp: , Rfl:     irbesartan (AVAPRO) 150 MG tablet, Take 1 tablet (150 mg total) by mouth once daily., Disp: 90 tablet, Rfl: 0    meloxicam (MOBIC) 15 MG tablet, TAKE 1 TABLET(15 MG) BY MOUTH EVERY DAY, Disp: 30 tablet, Rfl: 11    multivitamin (THERAGRAN) per tablet, Take 1 tablet by mouth once daily., Disp: , Rfl:     pantoprazole (PROTONIX) 40 MG tablet, Take 1 tablet (40 mg total) by mouth every morning., Disp: 90 tablet, Rfl: 0    rosuvastatin (CRESTOR) 5 MG tablet, Take 5 mg by mouth every evening., Disp: , Rfl:     amLODIPine (NORVASC) 5 MG tablet, Take 2 tablets (10 mg total)  "by mouth every evening., Disp: 60 tablet, Rfl: 1    diazePAM (VALIUM) 10 MG Tab, Take 1 tablet (10 mg total) by mouth On call Procedure (MRI)., Disp: 2 tablet, Rfl: 0  Blood pressure 135/70, pulse 81, resp. rate 18, height 5' 6" (1.676 m), weight 68.9 kg (151 lb 14.4 oz).      Neurological Exam    General: well developed, well nourished, no distress.   Head: normocephalic, atraumatic  Neurologic: Alert and oriented. Thought content appropriate.  [place] Coma Score: Motor: 6/Verbal: 5/Eyes: 4 GCS Total: 15  Cranial nerves: face symmetric, tongue midline, CN II-XII grossly intact.   Eyes: pupils equal, round, reactive to light with accomodation, EOMI.   Sensory: intact to light touch throughout     Motor Strength: Moves all extremities spontaneously with good tone. No abnormal movements seen.      Strength   Deltoids Triceps Biceps Wrist Extension Wrist Flexion Hand    Upper: R 5/5 5/5 5/5 5/5 5/5 5/5     L 5/5 5/5 5/5 5/5 5/5 5/5       Iliopsoas Quadriceps Knee  Flexion Tibialis  anterior Gastro- cnemius EHL   Lower: R 5/5 5/5 5/5 5/5 5/5 5/5     L 5/5 5/5 5/5 5/5 5/5 5/5      DTR's - 1 + LE  Wise: absent  Clonus: absent  Babinski: absent  Pulses: No lower extremity edema  Pulm: Normal respiratory effort  Skin: Intact, no visible rashes or lesions    +SIXTO, right  Right SLR mildly positive    Imaging personally reviewed and discussed with the patient:  XR right hip:  FINDINGS:  Postoperative changes are again identified relating to a prior right hip arthroplasty procedure, prostheses appearing unremarkable.  Hip joint space on the left side is adequately maintained without significant interval narrowing.  No conventional radiographic evidence to specifically suggest avascular necrosis of the left femoral head.  No evidence of recent or healing fracture, lytic destructive process, or development of abnormal periprosthetic lucency.  SI joints appear unremarkable.     Impression:     Postoperative changes of " right hip arthroplasty.  No significant detrimental interval change since 04/07/2022 is appreciated.        Electronically signed by:Aroldo Kent MD  Date:                                            12/10/2024  Time:                                           14:02      MRI L spine:  FINDINGS:  There is grade 2 anterolisthesis of L5 on S1 with associated chronic bilateral L5 spondylolysis.  There is 11 mm anterolisthesis of L5 on S1.  The vertebral body heights are maintained.  There is no acute fracture seen.  There is no marrow replacement or marrow proliferative process.  There are endplate degenerative changes noted.  There is visualized the spinal cord and conus medullaris are within normal limits.  The conus terminates at L1-2.     The visualized portions of the abdominal aorta normal caliber..  There high T2 signal lesions incompletely assessed exophytic from the left kidney, statistically most likely cysts.     T12-L1: Normal disc signal and disc space height.  No disc herniation or bulge.  No central canal or foraminal stenosis     L1-2: There is disc desiccation noted.  There is normal disc space height.  Minimal endplate degenerative changes noted.  Mild bilateral facet arthrosis noted.  No herniation or bulge.  No canal or foraminal stenosis     L2-3: There is mild moderate disc space narrowing.  Endplate degenerative changes noted anterior spurring noted.  There is mild bilateral facet arthrosis.  There is mild broad-based disc bulge.  There is no central canal stenosis or foraminal stenosis     L3-4: Moderate severe disc space narrowing with Modic type 1 endplate degenerative changes asymmetric to the right noted.  Mild bilateral facet arthrosis noted.  There is mild broad-based disc bulge with superimposed central right paracentral disc protrusion.  There is mild left and moderate right lateral recess stenosis.  No significant foraminal stenosis     L4-5: Mild moderate disc space narrowing noted.  Mild  broad-based disc bulge with superimposed central disc protrusion.  Mild bilateral facet arthrosis noted.  There is no central canal or foraminal stenosis     L5-S1: Severe disc space narrowing noted.  Chronic bilateral L5 pars defects noted.  Grade 2 anterolisthesis of L5 on S1 noted.  There is uncovering of disc resulting in severe bilateral foraminal stenosis with contact of the exiting L5 nerve roots.     Impression:     Grade 2 anterolisthesis of L5 on S1 with associated bilateral chronic L5 spondylolysis.  At L5-S1: Anterolisthesis of L5 on S1 resulting uncovered disc/pseudo disc formation resulting in severe bilateral foraminal stenosis with contact of the bilateral exiting L5 nerve roots.  At L3-4, mild broad-based disc bulge with superimposed central right paracentral disc protrusion and in combination with facet arthrosis resulting mild left and moderate right lateral recess stenosis.        Electronically signed by:Justin Pederson MD  Date:                                            12/17/2024  Time:                                           16:06        Assessment/Plan:    75-year-old gentleman 4 year status post hip replacement  He has some pain and numbness in the anterior thigh, neurologically intact  He has complex degenerative spinal imaging, I including a grade 2 chronic appearing spondylolisthesis at L5-S1  Which appears to be asymptomatic, he denies back pain, moderate degeneration at 4 5  And at L3-4 facet arthropathy a broad-based disc bulge with moderate lateral recess stenosis    We discussed options in detail he would like to proceed with physical therapy and a diagnostic therapeutic epidural steroid injection at L3-4  We also discussed the baseline dynamic lumbar x-rays      Signs symptoms needing medical evaluation reviewed in detail

## 2024-12-26 NOTE — H&P (VIEW-ONLY)
Neurosurgery History & Physical    Patient ID: Mohit Schneider is a 79 y.o. male.    Chief Complaint   Patient presents with    [facility]     Image review     HPI:  79 year old male with hx of right CARI 4 years ago presents for discussion of lumbar imaging. Reports several months of right groin and anterior thigh pain without inciting trauma/injury. Denies back pain, pain in left leg or distal RLE. The pain is described as an ache, exacerbated laying flat. Denies numbness/tingling, weakness.     He saw Dr. Tolentino in Ortho who ordered hip XR and lumbar MRI, recommended NSGY evaluation. No formal PT but is extremely active in pilates and weight training. No medications for this or eval by pain management.       Review of Systems   Constitutional:  Negative for activity change.   Respiratory:  Negative for cough.    Gastrointestinal:  Negative for nausea and vomiting.   Genitourinary:  Negative for difficulty urinating.   Musculoskeletal:  Positive for myalgias. Negative for back pain.   Neurological:  Negative for weakness and numbness.   Psychiatric/Behavioral:  Negative for confusion.        Past Medical History:   Diagnosis Date    Allergy     Arthritis     Hypertension     Sleep apnea     USES C-PAP     Social History     Socioeconomic History    Marital status:    Tobacco Use    Smoking status: Former     Current packs/day: 0.00     Types: Cigarettes     Quit date: 3/20/1985     Years since quittin.7    Smokeless tobacco: Never   Substance and Sexual Activity    Alcohol use: Yes     Alcohol/week: 14.0 standard drinks of alcohol     Types: 14 Glasses of wine per week    Drug use: No    Sexual activity: Yes     Partners: Female     Social Drivers of Health     Financial Resource Strain: Low Risk  (2024)    Overall Financial Resource Strain (CARDIA)     Difficulty of Paying Living Expenses: Not hard at all   Food Insecurity: No Food Insecurity (2024)    Hunger Vital Sign      Worried About Running Out of Food in the Last Year: Never true     Ran Out of Food in the Last Year: Never true   Transportation Needs: No Transportation Needs (10/12/2024)    Received from [facility]    PRAPARE - Transportation     Lack of Transportation (Medical): No     Lack of Transportation (Non-Medical): No   Physical Activity: Sufficiently Active (12/24/2024)    Exercise Vital Sign     Days of Exercise per Week: 3 days     Minutes of Exercise per Session: 60 min   Stress: Stress Concern Present (12/24/2024)    [employer] - Occupational Stress Questionnaire     Feeling of Stress : To some extent   Housing Stability: Unknown (10/12/2024)    Received from [facility]    Housing Stability Vital Sign     Unable to Pay for Housing in the Last Year: No     Family History   Problem Relation Name Age of Onset    Kidney disease Mother      Stroke Father      Hypertension Father       Review of patient's allergies indicates:   Allergen Reactions    Penicillins Other (See Comments)     As a child       Current Outpatient Medications:     aspirin (ECOTRIN) 81 MG EC tablet, Take 1 tablet by mouth once daily., Disp: , Rfl:     carbidopa-levodopa  mg (SINEMET)  mg per tablet, Take 1 tablet by mouth 3 (three) times daily., Disp: , Rfl:     cetirizine (ZYRTEC) 5 MG tablet, Take 5 mg by mouth once daily., Disp: , Rfl:     irbesartan (AVAPRO) 150 MG tablet, Take 1 tablet (150 mg total) by mouth once daily., Disp: 90 tablet, Rfl: 0    meloxicam (MOBIC) 15 MG tablet, TAKE 1 TABLET(15 MG) BY MOUTH EVERY DAY, Disp: 30 tablet, Rfl: 11    multivitamin (THERAGRAN) per tablet, Take 1 tablet by mouth once daily., Disp: , Rfl:     pantoprazole (PROTONIX) 40 MG tablet, Take 1 tablet (40 mg total) by mouth every morning., Disp: 90 tablet, Rfl: 0    rosuvastatin (CRESTOR) 5 MG tablet, Take 5 mg by mouth every evening., Disp: , Rfl:     amLODIPine (NORVASC) 5 MG tablet, Take 2 tablets (10 mg total)  "by mouth every evening., Disp: 60 tablet, Rfl: 1    diazePAM (VALIUM) 10 MG Tab, Take 1 tablet (10 mg total) by mouth On call Procedure (MRI)., Disp: 2 tablet, Rfl: 0  Blood pressure 135/70, pulse 81, resp. rate 18, height 5' 6" (1.676 m), weight 68.9 kg (151 lb 14.4 oz).      Neurological Exam    General: well developed, well nourished, no distress.   Head: normocephalic, atraumatic  Neurologic: Alert and oriented. Thought content appropriate.  [place] Coma Score: Motor: 6/Verbal: 5/Eyes: 4 GCS Total: 15  Cranial nerves: face symmetric, tongue midline, CN II-XII grossly intact.   Eyes: pupils equal, round, reactive to light with accomodation, EOMI.   Sensory: intact to light touch throughout     Motor Strength: Moves all extremities spontaneously with good tone. No abnormal movements seen.      Strength   Deltoids Triceps Biceps Wrist Extension Wrist Flexion Hand    Upper: R 5/5 5/5 5/5 5/5 5/5 5/5     L 5/5 5/5 5/5 5/5 5/5 5/5       Iliopsoas Quadriceps Knee  Flexion Tibialis  anterior Gastro- cnemius EHL   Lower: R 5/5 5/5 5/5 5/5 5/5 5/5     L 5/5 5/5 5/5 5/5 5/5 5/5      DTR's - 1 + LE  Wise: absent  Clonus: absent  Babinski: absent  Pulses: No lower extremity edema  Pulm: Normal respiratory effort  Skin: Intact, no visible rashes or lesions    +SIXTO, right  Right SLR mildly positive    Imaging personally reviewed and discussed with the patient:  XR right hip:  FINDINGS:  Postoperative changes are again identified relating to a prior right hip arthroplasty procedure, prostheses appearing unremarkable.  Hip joint space on the left side is adequately maintained without significant interval narrowing.  No conventional radiographic evidence to specifically suggest avascular necrosis of the left femoral head.  No evidence of recent or healing fracture, lytic destructive process, or development of abnormal periprosthetic lucency.  SI joints appear unremarkable.     Impression:     Postoperative changes of " right hip arthroplasty.  No significant detrimental interval change since 04/07/2022 is appreciated.        Electronically signed by:Aroldo Kent MD  Date:                                            12/10/2024  Time:                                           14:02      MRI L spine:  FINDINGS:  There is grade 2 anterolisthesis of L5 on S1 with associated chronic bilateral L5 spondylolysis.  There is 11 mm anterolisthesis of L5 on S1.  The vertebral body heights are maintained.  There is no acute fracture seen.  There is no marrow replacement or marrow proliferative process.  There are endplate degenerative changes noted.  There is visualized the spinal cord and conus medullaris are within normal limits.  The conus terminates at L1-2.     The visualized portions of the abdominal aorta normal caliber..  There high T2 signal lesions incompletely assessed exophytic from the left kidney, statistically most likely cysts.     T12-L1: Normal disc signal and disc space height.  No disc herniation or bulge.  No central canal or foraminal stenosis     L1-2: There is disc desiccation noted.  There is normal disc space height.  Minimal endplate degenerative changes noted.  Mild bilateral facet arthrosis noted.  No herniation or bulge.  No canal or foraminal stenosis     L2-3: There is mild moderate disc space narrowing.  Endplate degenerative changes noted anterior spurring noted.  There is mild bilateral facet arthrosis.  There is mild broad-based disc bulge.  There is no central canal stenosis or foraminal stenosis     L3-4: Moderate severe disc space narrowing with Modic type 1 endplate degenerative changes asymmetric to the right noted.  Mild bilateral facet arthrosis noted.  There is mild broad-based disc bulge with superimposed central right paracentral disc protrusion.  There is mild left and moderate right lateral recess stenosis.  No significant foraminal stenosis     L4-5: Mild moderate disc space narrowing noted.  Mild  broad-based disc bulge with superimposed central disc protrusion.  Mild bilateral facet arthrosis noted.  There is no central canal or foraminal stenosis     L5-S1: Severe disc space narrowing noted.  Chronic bilateral L5 pars defects noted.  Grade 2 anterolisthesis of L5 on S1 noted.  There is uncovering of disc resulting in severe bilateral foraminal stenosis with contact of the exiting L5 nerve roots.     Impression:     Grade 2 anterolisthesis of L5 on S1 with associated bilateral chronic L5 spondylolysis.  At L5-S1: Anterolisthesis of L5 on S1 resulting uncovered disc/pseudo disc formation resulting in severe bilateral foraminal stenosis with contact of the bilateral exiting L5 nerve roots.  At L3-4, mild broad-based disc bulge with superimposed central right paracentral disc protrusion and in combination with facet arthrosis resulting mild left and moderate right lateral recess stenosis.        Electronically signed by:Justin Pederson MD  Date:                                            12/17/2024  Time:                                           16:06        Assessment/Plan:    75-year-old gentleman 4 year status post hip replacement  He has some pain and numbness in the anterior thigh, neurologically intact  He has complex degenerative spinal imaging, I including a grade 2 chronic appearing spondylolisthesis at L5-S1  Which appears to be asymptomatic, he denies back pain, moderate degeneration at 4 5  And at L3-4 facet arthropathy a broad-based disc bulge with moderate lateral recess stenosis    We discussed options in detail he would like to proceed with physical therapy and a diagnostic therapeutic epidural steroid injection at L3-4  We also discussed the baseline dynamic lumbar x-rays      Signs symptoms needing medical evaluation reviewed in detail

## 2024-12-31 ENCOUNTER — CLINICAL SUPPORT (OUTPATIENT)
Dept: UROLOGY | Facility: CLINIC | Age: 79
End: 2024-12-31
Payer: MEDICARE

## 2024-12-31 DIAGNOSIS — R79.89 LOW TESTOSTERONE LEVEL IN MALE: Primary | ICD-10-CM

## 2024-12-31 PROCEDURE — 99999PBSHW PR PBB SHADOW TECHNICAL ONLY FILED TO HB: Mod: PBBFAC,,,

## 2024-12-31 PROCEDURE — 99999 PR PBB SHADOW E&M-EST. PATIENT-LVL II: CPT | Mod: PBBFAC,,,

## 2024-12-31 PROCEDURE — 99212 OFFICE O/P EST SF 10 MIN: CPT | Mod: PBBFAC,PO

## 2024-12-31 PROCEDURE — 96372 THER/PROPH/DIAG INJ SC/IM: CPT | Mod: PBBFAC,PO

## 2024-12-31 RX ORDER — TESTOSTERONE CYPIONATE 200 MG/ML
150 INJECTION, SOLUTION INTRAMUSCULAR
Status: SHIPPED | OUTPATIENT
Start: 2024-12-31 | End: 2025-06-17

## 2024-12-31 RX ADMIN — TESTOSTERONE CYPIONATE 150 MG: 200 INJECTION INTRAMUSCULAR at 11:12

## 2025-01-02 ENCOUNTER — TELEPHONE (OUTPATIENT)
Dept: PAIN MEDICINE | Facility: CLINIC | Age: 80
End: 2025-01-02
Payer: MEDICARE

## 2025-01-02 NOTE — TELEPHONE ENCOUNTER
----- Message from Nurse Moise sent at 2024 11:11 AM CST -----  Regarding: Order for TORSTEN HOLT    Patient Name: TORSTEN HOLT([id number])  Sex: Male  : 1945      PCP: MICKI SMITH    Center: [facility]     Types of orders made on 2024: Outpatient Referral, Procedure Request    Order Date:2024  Ordering User:SHANNAN ARMANDO [[id number]]  Encounter Provider:Janes Hernández MD [[id number]]  Authorizing Provider: Janes Hernández MD [[id number]]  Department:[facility][[id number]]    Common Order Information  Procedure -> Epidural Injection (specify level) Cmt: L3-4    Order Specific Information  Order: Procedure Order to Pain Management [Custom: [id number]]  Order #:          [id number]: 1 FUTURE    Priority: Routine  Class: Clinic Performed    Future Order Information      Expires on:2025            Expected by:2024                   Associated Diagnoses      M54.16 Right lumbar radiculopathy      Facility Name: -> [facility]           Priority: Routine  Class: Clinic Performed    Future Order Information      Expires on:2025            Expected by:2024                   Associated Diagnoses      M54.16 Right lumbar radiculopathy      Procedure -> Epidural Injection (specify level) Cmt: L3-4        Facility Name: -> [facility]

## 2025-01-02 NOTE — TELEPHONE ENCOUNTER
Physician - Dr Fraga    Type of Procedure/Injection - Lumbar Epidural  L3/4 to the right           Laterality - NA      Priority - Normal      Anxiolysis- Local      Need to hold medication - Yes      Aspirin for 7 days and NSAIDs for 2 days    None      Clearance needed - Yes      Follow up - 3 week

## 2025-01-03 DIAGNOSIS — M54.16 LUMBAR RADICULOPATHY: Primary | ICD-10-CM

## 2025-01-03 RX ORDER — ALPRAZOLAM 1 MG/1
1 TABLET, ORALLY DISINTEGRATING ORAL ONCE AS NEEDED
OUTPATIENT
Start: 2025-01-03 | End: 2036-06-01

## 2025-01-03 NOTE — TELEPHONE ENCOUNTER
Spoke with patient and scheduled procedure and f/u. Let him know to hold ASA for 7 days prior and NSAIDs for 2 days prior. Faxed clearance to Dr. Lo

## 2025-01-07 NOTE — PROGRESS NOTES
2024    HPI:  Mohit Schneider is a 78 y.o. male, who presents to clinic today for continued evaluation of his right hand cellulitis with associated abscess status post I&D.  States he is kept his dressing dry, clean, and intact as instructed.  States overall he is doing very well.  States he is taken his antibiotics as instructed.  States pain continues to improve.  Denies any complaints at this time.    PMHX:  Past Medical History:   Diagnosis Date    Allergy     Arthritis     Hypertension     Sleep apnea     USES C-PAP       PSHX:  Past Surgical History:   Procedure Laterality Date    ARTHROPLASTY OF HIP BY ANTERIOR APPROACH Right 2/15/2021    Procedure: ARTHROPLASTY, HIP, ANTERIOR APPROACH;  Surgeon: Maynor Tolentino MD;  [facility];  Service: Orthopedics;  Laterality: Right;    CIRCUMCISION      EYE SURGERY Left     age 6    HERNIA REPAIR Left     TONSILLECTOMY         FMHX:  Family History   Problem Relation Age of Onset    Kidney disease Mother     Stroke Father     Hypertension Father        SOCHX:  Social History     Tobacco Use    Smoking status: Former     Current packs/day: 0.00     Types: Cigarettes     Quit date: 3/20/1985     Years since quittin.8    Smokeless tobacco: Never   Substance Use Topics    Alcohol use: Yes     Alcohol/week: 14.0 standard drinks of alcohol     Types: 14 Glasses of wine per week       ALLERGIES:  Penicillins    CURRENT MEDICATIONS:  Current Outpatient Medications on File Prior to Visit   Medication Sig Dispense Refill    amLODIPine (NORVASC) 5 MG tablet Take 2 tablets (10 mg total) by mouth every evening. 60 tablet 1    carbidopa-levodopa  mg (SINEMET)  mg per tablet Take 1 tablet by mouth 3 (three) times daily.      cetirizine (ZYRTEC) 5 MG tablet Take 5 mg by mouth once daily.      irbesartan (AVAPRO) 150 MG tablet Take 1 tablet (150 mg total) by mouth once daily. 90 tablet 3    meloxicam (MOBIC) 15 MG tablet TAKE 1 TABLET(15 MG) BY MOUTH  Hypertension is stable.  Goal is less than 140/90  Continue current treatment regimen.  Metoprolol, Amlodipine and Ramipril as prescribed.  Blood pressure will be reassessed at the next regular appointment.   "EVERY DAY 30 tablet 11    multivitamin (THERAGRAN) per tablet Take 1 tablet by mouth once daily.      pantoprazole (PROTONIX) 40 MG tablet Take 1 tablet (40 mg total) by mouth every morning. 90 tablet 3    rasagiline (AZILECT) 1 mg Tab Take 1 mg by mouth.      rosuvastatin (CRESTOR) 5 MG tablet Take 5 mg by mouth every evening.      sulfamethoxazole-trimethoprim 800-160mg (BACTRIM DS) 800-160 mg Tab Take 1 tablet by mouth 2 (two) times daily. for 10 days 20 tablet 0    clindamycin (CLEOCIN) 300 MG capsule Take 1 capsule (300 mg total) by mouth On call Procedure (dental work). (Patient not taking: Reported on 11/14/2023) 5 capsule 5     No current facility-administered medications on file prior to visit.       REVIEW OF SYSTEMS:  Review of Systems Complete; Negative, unless noted above.    GENERAL PHYSICAL EXAM:   Ht 5' 6" (1.676 m)   Wt 68 kg (150 lb)   BMI 24.21 kg/m²    GEN: well developed, well nourished, no acute distress   PULM: No wheezing, no respiratory distress   CV: RRR    ORTHO EXAM:   Examination of the right hand reveals an appropriately healing I&D sites.  No erythema, fluctuance, purulence, or drainage.  No significant tenderness palpation of the I and D sites.  Sensation is grossly intact in the radial, ulnar, median nerve distributions.  Capillary refill less than 2 seconds.    RADIOLOGY:   None.    ASSESSMENT:   Right hand cellulitis with abscess status post I&D    PLAN:  1. I discussed with Mohit Schneider and his wife that he is progressing very well in the treatment course.  We discussed the best course of action this time is to begin daily dressing changes and wound washing with Hibiclens.  He verbally agreed with the treatment plan.    2. His wound was thoroughly cleaned in clinic today with chlorhexidine.  His wound was then dressed with 4 x 4 gauze, cast padding, and an Ace wrap.    3.  I would like him follow-up in clinic in 1 week for repeat evaluation.  He was instructed to " contact clinic for any problems or concerns in the interim.

## 2025-01-08 ENCOUNTER — TELEPHONE (OUTPATIENT)
Dept: PAIN MEDICINE | Facility: CLINIC | Age: 80
End: 2025-01-08
Payer: MEDICARE

## 2025-01-13 ENCOUNTER — HOSPITAL ENCOUNTER (OUTPATIENT)
Facility: HOSPITAL | Age: 80
Discharge: HOME | End: 2025-01-13
Attending: ANESTHESIOLOGY | Admitting: ANESTHESIOLOGY
Payer: MEDICARE

## 2025-01-13 ENCOUNTER — HOSPITAL ENCOUNTER (OUTPATIENT)
Dept: RADIOLOGY | Facility: HOSPITAL | Age: 80
Discharge: HOME | End: 2025-01-13
Attending: ANESTHESIOLOGY | Admitting: ANESTHESIOLOGY
Payer: MEDICARE

## 2025-01-13 DIAGNOSIS — M54.16 LUMBAR RADICULOPATHY: ICD-10-CM

## 2025-01-13 DIAGNOSIS — M54.50 LOWER BACK PAIN: ICD-10-CM

## 2025-01-13 PROCEDURE — 36000704 HC OR TIME LEV I 1ST 15 MIN: Mod: PO | Performed by: ANESTHESIOLOGY

## 2025-01-13 PROCEDURE — 62323 NJX INTERLAMINAR LMBR/SAC: CPT | Mod: ,,, | Performed by: ANESTHESIOLOGY

## 2025-01-13 PROCEDURE — A4216 STERILE WATER/SALINE, 10 ML: HCPCS | Mod: PO | Performed by: ANESTHESIOLOGY

## 2025-01-13 PROCEDURE — 63600175 PHARM REV CODE 636 W HCPCS: Mod: JZ,TB,PO | Performed by: ANESTHESIOLOGY

## 2025-01-13 PROCEDURE — 25500020 PHARM REV CODE 255: Mod: PO | Performed by: ANESTHESIOLOGY

## 2025-01-13 PROCEDURE — 62323 NJX INTERLAMINAR LMBR/SAC: CPT | Mod: PO | Performed by: ANESTHESIOLOGY

## 2025-01-13 PROCEDURE — 25000003 PHARM REV CODE 250: Mod: PO | Performed by: ANESTHESIOLOGY

## 2025-01-13 RX ORDER — LIDOCAINE HYDROCHLORIDE 10 MG/ML
INJECTION, SOLUTION EPIDURAL; INFILTRATION; INTRACAUDAL; PERINEURAL
Status: DISCONTINUED | OUTPATIENT
Start: 2025-01-13 | End: 2025-01-13 | Stop reason: HOSPADM

## 2025-01-13 RX ORDER — ALPRAZOLAM 0.5 MG/1
1 TABLET, ORALLY DISINTEGRATING ORAL ONCE AS NEEDED
Status: DISCONTINUED | OUTPATIENT
Start: 2025-01-13 | End: 2025-01-13 | Stop reason: HOSPADM

## 2025-01-13 RX ORDER — METHYLPREDNISOLONE ACETATE 80 MG/ML
INJECTION, SUSPENSION INTRA-ARTICULAR; INTRALESIONAL; INTRAMUSCULAR; SOFT TISSUE
Status: DISCONTINUED | OUTPATIENT
Start: 2025-01-13 | End: 2025-01-13 | Stop reason: HOSPADM

## 2025-01-13 RX ORDER — SODIUM CHLORIDE 9 MG/ML
INJECTION, SOLUTION INTRAMUSCULAR; INTRAVENOUS; SUBCUTANEOUS
Status: DISCONTINUED | OUTPATIENT
Start: 2025-01-13 | End: 2025-01-13 | Stop reason: HOSPADM

## 2025-01-13 NOTE — DISCHARGE SUMMARY
[facility] - Surgery  Discharge Note  Short Stay    Procedure(s) (LRB):  Injection-steroid-epidural-lumbar L3/4 to the right (N/A)      OUTCOME: Patient tolerated treatment/procedure well without complication and is now ready for discharge.    DISPOSITION: Home or Self Care    FINAL DIAGNOSIS:  Lumbar radiculopathy    FOLLOWUP: In clinic    DISCHARGE INSTRUCTIONS:    Discharge Procedure Orders   Diet Adult Regular     No dressing needed     Notify your health care provider if you experience any of the following:  temperature >100.4     Activity as tolerated

## 2025-01-13 NOTE — OP NOTE
PROCEDURE DATE: 1/13/2025    Lumbar Interlaminar Epidural Steroid Injection under Fluoroscopic Guidance, AP Approach.    Procedure:   Interlaminar epidural steroid injection at L3/4 under fluoroscopic guidance.    Diagnosis: lUMBAR Radiculopathy    pOSTOP DIAGNOSIS: sAME    Physician: Te Fraga M.D.    Medications injected:80 mg methylprednisone with 4 ml of preservative free NaCl    Local anesthetic injected:    Lidocaine 1% 4 ml total    Sedation Medications: none    Estimated blood loss:  none    Complications:  none    Technique:  Time-out taken to identify patient and procedure prior to starting the procedure.  With the patient laying in a prone position, the area was prepped and draped in the usual sterile fashion using ChloraPrep and a fenestrated drape.  After determining the target level with an AP fluoroscopic view, local anesthetic was given using a 25-gauge 1.5 inch needle by raising a wheal and then infiltrating toward the interlaminar entry space.  A 3.5inch 20-gauge Touhy needle was introduced under AP fluoroscopic guidance to the interlaminar space of L3/4. Once the trajectory was established, the needle was visualized in the lateral view and advanced using loss of resistance technique. Once in the desired position, omnipaque contrast was injected to confirm placement and there was no vascular uptake nor intrathecal spread.  The medication was then injected slowly. The patient tolerated the procedure well.      The patient was monitored after the procedure.   They were given post-procedure and discharge instructions to follow at home.  The patient was discharged in a stable condition.

## 2025-01-14 ENCOUNTER — CLINICAL SUPPORT (OUTPATIENT)
Dept: UROLOGY | Facility: CLINIC | Age: 80
End: 2025-01-14
Payer: MEDICARE

## 2025-01-14 VITALS
WEIGHT: 150 LBS | SYSTOLIC BLOOD PRESSURE: 159 MMHG | DIASTOLIC BLOOD PRESSURE: 79 MMHG | RESPIRATION RATE: 18 BRPM | HEIGHT: 66 IN | TEMPERATURE: 98 F | HEART RATE: 94 BPM | BODY MASS INDEX: 24.11 KG/M2 | OXYGEN SATURATION: 97 %

## 2025-01-14 DIAGNOSIS — R79.89 LOW TESTOSTERONE LEVEL IN MALE: Primary | ICD-10-CM

## 2025-01-14 PROCEDURE — 96372 THER/PROPH/DIAG INJ SC/IM: CPT | Mod: PBBFAC,PO

## 2025-01-14 PROCEDURE — 99999PBSHW PR PBB SHADOW TECHNICAL ONLY FILED TO HB: Mod: PBBFAC,,,

## 2025-01-14 PROCEDURE — 99212 OFFICE O/P EST SF 10 MIN: CPT | Mod: PBBFAC,PO,25

## 2025-01-14 PROCEDURE — 99999 PR PBB SHADOW E&M-EST. PATIENT-LVL II: CPT | Mod: PBBFAC,,,

## 2025-01-14 RX ADMIN — TESTOSTERONE CYPIONATE 150 MG: 200 INJECTION INTRAMUSCULAR at 02:01

## 2025-01-28 ENCOUNTER — PATIENT MESSAGE (OUTPATIENT)
Dept: UROLOGY | Facility: CLINIC | Age: 80
End: 2025-01-28

## 2025-01-28 ENCOUNTER — CLINICAL SUPPORT (OUTPATIENT)
Dept: UROLOGY | Facility: CLINIC | Age: 80
End: 2025-01-28
Payer: MEDICARE

## 2025-01-28 DIAGNOSIS — R79.89 LOW TESTOSTERONE LEVEL IN MALE: Primary | ICD-10-CM

## 2025-01-28 PROCEDURE — 99999PBSHW PR PBB SHADOW TECHNICAL ONLY FILED TO HB: Mod: PBBFAC,,,

## 2025-01-28 PROCEDURE — 99999 PR PBB SHADOW E&M-EST. PATIENT-LVL II: CPT | Mod: PBBFAC,,,

## 2025-01-28 PROCEDURE — 99212 OFFICE O/P EST SF 10 MIN: CPT | Mod: PBBFAC,PO

## 2025-01-28 PROCEDURE — 96372 THER/PROPH/DIAG INJ SC/IM: CPT | Mod: PBBFAC,PO

## 2025-01-28 RX ADMIN — TESTOSTERONE CYPIONATE 150 MG: 200 INJECTION INTRAMUSCULAR at 01:01

## 2025-01-31 ENCOUNTER — PATIENT MESSAGE (OUTPATIENT)
Dept: ORTHOPEDICS | Facility: CLINIC | Age: 80
End: 2025-01-31
Payer: MEDICARE

## 2025-02-04 RX ORDER — AMOXICILLIN AND CLAVULANATE POTASSIUM 875; 125 MG/1; MG/1
1 TABLET, FILM COATED ORAL
Qty: 5 TABLET | Refills: 1 | OUTPATIENT
Start: 2025-02-04

## 2025-02-04 RX ORDER — CLINDAMYCIN HYDROCHLORIDE 300 MG/1
300 CAPSULE ORAL
Qty: 3 CAPSULE | Refills: 0 | Status: SHIPPED | OUTPATIENT
Start: 2025-02-04

## 2025-02-10 ENCOUNTER — OFFICE VISIT (OUTPATIENT)
Dept: PAIN MEDICINE | Facility: CLINIC | Age: 80
End: 2025-02-10
Payer: MEDICARE

## 2025-02-10 VITALS
WEIGHT: 152.88 LBS | HEART RATE: 88 BPM | BODY MASS INDEX: 24.68 KG/M2 | DIASTOLIC BLOOD PRESSURE: 75 MMHG | SYSTOLIC BLOOD PRESSURE: 137 MMHG

## 2025-02-10 DIAGNOSIS — M54.16 RIGHT LUMBAR RADICULOPATHY: ICD-10-CM

## 2025-02-10 DIAGNOSIS — M43.16 SPONDYLOLISTHESIS OF LUMBAR REGION: Primary | ICD-10-CM

## 2025-02-10 DIAGNOSIS — M47.816 LUMBAR SPONDYLOSIS: ICD-10-CM

## 2025-02-10 PROCEDURE — 99999 PR PBB SHADOW E&M-EST. PATIENT-LVL III: CPT | Mod: PBBFAC,,, | Performed by: ANESTHESIOLOGY

## 2025-02-10 PROCEDURE — 99213 OFFICE O/P EST LOW 20 MIN: CPT | Mod: PBBFAC,PO | Performed by: ANESTHESIOLOGY

## 2025-02-10 NOTE — PROGRESS NOTES
This note was completed with dictation software and grammatical errors may exist.    Chief Complaint   Patient presents with    Back Pain        HPI: Mohit Schneider is a 79 y.o. year old male patient who has a past medical history of Allergy, Arthritis, Hypertension, and Sleep apnea. He presents in referral from Dr. Janes Hernández for back pain.  The patient reports that he had a hip replacement on the right side about 4 years ago with Dr. Tolentino.  He was doing very well but this last year began having pain in the right groin, right thigh.  This is worse with standing, walking, activity.  He denies any weakness.  He went to see Dr. Tolentino who check the hip, everything looked fine and so they obtained an MRI which did show some foraminal narrowing and central narrowing out to the right side at L3/4.  He saw Dr. Hernández, Neurosurgery who referred him for an epidural steroid injection. He is status post L3/4 LEEANNA on 01/13/2025 with 95% relief.  He states that within several days the pain resolve, he states that he has almost no pain in the right hip anymore.  Is doing exercise several days a week, does Pilates and core strengthening.  He denies any more back pain, weakness.    Pain intervention history:He is status post L3/4 LEEANNA on 01/13/2025 with 95% relief.    Spine surgeries:  Right hip replacement 2021    Antineuropathics:  NSAIDs:  Mobic  Physical therapy:  Antidepressants:  Muscle relaxers:  Opioids:  Antiplatelets/Anticoagulants:  Aspirin        ROS:  He reports mild back pain, stiffness.  Balance of review of systems is negative.    Lab Results   Component Value Date    HGBA1C 5.3 10/13/2021       Lab Results   Component Value Date    WBC 5.66 07/08/2024    HGB 13.5 (L) 07/08/2024    HCT 41.5 07/08/2024    MCV 91 07/08/2024     07/08/2024             Past Medical History:   Diagnosis Date    Allergy     Arthritis     Hypertension     Sleep apnea     USES C-PAP       Past Surgical History:   Procedure  Laterality Date    ARTHROPLASTY OF HIP BY ANTERIOR APPROACH Right 2/15/2021    Procedure: ARTHROPLASTY, HIP, ANTERIOR APPROACH;  Surgeon: Maynor Tolentino MD;  Location: [facility] OR;  Service: Orthopedics;  Laterality: Right;    CIRCUMCISION      EPIDURAL STEROID INJECTION INTO LUMBAR SPINE N/A 2025    Procedure: Injection-steroid-epidural-lumbar L3/4 to the right;  Surgeon: Te Fraga MD;  Location: [facility] OR;  Service: Pain Management;  Laterality: N/A;    EYE SURGERY Left     age 6    HERNIA REPAIR Left     TONSILLECTOMY         Social History     Socioeconomic History    Marital status:    Tobacco Use    Smoking status: Former     Current packs/day: 0.00     Types: Cigarettes     Quit date: 3/20/1985     Years since quittin.9    Smokeless tobacco: Never   Substance and Sexual Activity    Alcohol use: Yes     Alcohol/week: 14.0 standard drinks of alcohol     Types: 14 Glasses of wine per week    Drug use: No    Sexual activity: Yes     Partners: Female     Social Drivers of Health     Financial Resource Strain: Low Risk  (2024)    Overall Financial Resource Strain (CARDIA)     Difficulty of Paying Living Expenses: Not hard at all   Food Insecurity: No Food Insecurity (2024)    Hunger Vital Sign     Worried About Running Out of Food in the Last Year: Never true     Ran Out of Food in the Last Year: Never true   Transportation Needs: No Transportation Needs (10/12/2024)    Received [facility]    [facility]     Lack of Transportation (Medical): No     Lack of Transportation (Non-Medical): No   Physical Activity: Sufficiently Active (2024)    Exercise Vital Sign     Days of Exercise per Week: 3 days     Minutes of Exercise per Session: 60 min   Stress: Stress Concern Present (2024)    [employer] [employer] - Occupational Stress Questionnaire     Feeling of Stress : To some extent   Housing Stability: Unknown (10/12/2024)    Received  [facility]    Housing Stability Vital Sign     Unable to Pay for Housing in the Last Year: No         Medications/Allergies: See med card    Vitals:    02/10/25 0825   BP: 137/75   Pulse: 88   Weight: 69.3 kg (152 lb 14.2 oz)   PainSc: 0-No pain   PainLoc: Back     Body mass index is 24.68 kg/m².      2/10/2025     8:24 AM   Last 3 PDI Scores   Pain Disability Index (PDI) 1       Physical exam:    Gen: A and O x3, pleasant, well-groomed    Musculoskeletal:  No antalgic gait.         Imagin24 MRI L-spine:  There is grade 2 anterolisthesis of L5 on S1 with associated chronic bilateral L5 spondylolysis.  There is 11 mm anterolisthesis of L5 on S1.  The vertebral body heights are maintained.  There is no acute fracture seen.  There is no marrow replacement or marrow proliferative process.  There are endplate degenerative changes noted.  There is visualized the spinal cord and conus medullaris are within normal limits.  The conus terminates at L1-2.   The visualized portions of the abdominal aorta normal caliber..  There high T2 signal lesions incompletely assessed exophytic from the left kidney, statistically most likely cysts.   T12-L1: Normal disc signal and disc space height.  No disc herniation or bulge.  No central canal or foraminal stenosis   L1-2: There is disc desiccation noted.  There is normal disc space height.  Minimal endplate degenerative changes noted.  Mild bilateral facet arthrosis noted.  No herniation or bulge.  No canal or foraminal stenosis   L2-3: There is mild moderate disc space narrowing.  Endplate degenerative changes noted anterior spurring noted.  There is mild bilateral facet arthrosis.  There is mild broad-based disc bulge.  There is no central canal stenosis or foraminal stenosis   L3-4: Moderate severe disc space narrowing with Modic type 1 endplate degenerative changes asymmetric to the right noted.  Mild bilateral facet arthrosis noted.  There is mild broad-based disc  bulge with superimposed central right paracentral disc protrusion.  There is mild left and moderate right lateral recess stenosis.  No significant foraminal stenosis   L4-5: Mild moderate disc space narrowing noted.  Mild broad-based disc bulge with superimposed central disc protrusion.  Mild bilateral facet arthrosis noted.  There is no central canal or foraminal stenosis   L5-S1: Severe disc space narrowing noted.  Chronic bilateral L5 pars defects noted.  Grade 2 anterolisthesis of L5 on S1 noted.  There is uncovering of disc resulting in severe bilateral foraminal stenosis with contact of the exiting L5 nerve roots.    Assessment:  Mohit Schneider is a 79 y.o. year old male patient who has a past medical history of Allergy, Arthritis, Hypertension, and Sleep apnea. He presents in referral from Dr. Janes Hernández for back and right hip pain.      1. Spondylolisthesis of lumbar region        2. Right lumbar radiculopathy  Ambulatory referral/consult to Pain Clinic      3. Lumbar spondylosis            Plan:  1.  We reviewed his symptoms, reviewed his lumbar spine MRI and I educated him on pathology, narrowing out to the right side at L3/4.  Does have some narrowing to the left at L2/3, also spondylolisthesis at L5/S1 but those do not seem to be symptomatic.  I encouraged him to continue with exercise, Pilates.  He will follow up as needed for now.      Thank you for referring this interesting patient, and I look forward to continuing to collaborate in his care.

## 2025-02-11 ENCOUNTER — CLINICAL SUPPORT (OUTPATIENT)
Dept: UROLOGY | Facility: CLINIC | Age: 80
End: 2025-02-11
Payer: MEDICARE

## 2025-02-11 VITALS — HEIGHT: 66 IN | BODY MASS INDEX: 24.68 KG/M2

## 2025-02-11 DIAGNOSIS — R79.89 LOW TESTOSTERONE LEVEL IN MALE: Primary | ICD-10-CM

## 2025-02-11 PROCEDURE — 99999PBSHW PR PBB SHADOW TECHNICAL ONLY FILED TO HB: Mod: PBBFAC,,,

## 2025-02-11 PROCEDURE — 99211 OFF/OP EST MAY X REQ PHY/QHP: CPT | Mod: PBBFAC,PO

## 2025-02-11 PROCEDURE — 99999 PR PBB SHADOW E&M-EST. PATIENT-LVL I: CPT | Mod: PBBFAC,,,

## 2025-02-11 PROCEDURE — 96372 THER/PROPH/DIAG INJ SC/IM: CPT | Mod: PBBFAC,PO

## 2025-02-11 RX ADMIN — TESTOSTERONE CYPIONATE 150 MG: 200 INJECTION INTRAMUSCULAR at 02:02

## 2025-02-25 ENCOUNTER — CLINICAL SUPPORT (OUTPATIENT)
Dept: UROLOGY | Facility: CLINIC | Age: 80
End: 2025-02-25
Payer: MEDICARE

## 2025-02-25 DIAGNOSIS — R79.89 LOW TESTOSTERONE LEVEL IN MALE: Primary | ICD-10-CM

## 2025-02-25 PROCEDURE — 99999 PR PBB SHADOW E&M-EST. PATIENT-LVL II: CPT | Mod: PBBFAC,,,

## 2025-02-25 PROCEDURE — 99212 OFFICE O/P EST SF 10 MIN: CPT | Mod: PBBFAC,PO

## 2025-02-25 PROCEDURE — 96372 THER/PROPH/DIAG INJ SC/IM: CPT | Mod: PBBFAC,PO

## 2025-02-25 PROCEDURE — 99999PBSHW PR PBB SHADOW TECHNICAL ONLY FILED TO HB: Mod: PBBFAC,,,

## 2025-02-25 RX ADMIN — TESTOSTERONE CYPIONATE 150 MG: 200 INJECTION INTRAMUSCULAR at 02:02

## 2025-02-25 NOTE — PROGRESS NOTES
Pt received 200 mg testosterone injection IM in RUQ of buttocks. Pt tolerated administration well. Pt refused to stay for observation. Pt verbalized understand of the risk with leaving the clinic.

## 2025-03-06 ENCOUNTER — PATIENT MESSAGE (OUTPATIENT)
Dept: UROLOGY | Facility: CLINIC | Age: 80
End: 2025-03-06
Payer: MEDICARE

## 2025-03-10 ENCOUNTER — PATIENT MESSAGE (OUTPATIENT)
Dept: UROLOGY | Facility: CLINIC | Age: 80
End: 2025-03-10
Payer: MEDICARE

## 2025-03-18 ENCOUNTER — CLINICAL SUPPORT (OUTPATIENT)
Dept: UROLOGY | Facility: CLINIC | Age: 80
End: 2025-03-18
Payer: MEDICARE

## 2025-03-18 DIAGNOSIS — R79.89 LOW TESTOSTERONE LEVEL IN MALE: Primary | ICD-10-CM

## 2025-03-18 PROCEDURE — 99999PBSHW PR PBB SHADOW TECHNICAL ONLY FILED TO HB: Mod: PBBFAC,,,

## 2025-03-18 PROCEDURE — 99212 OFFICE O/P EST SF 10 MIN: CPT | Mod: PBBFAC,PO

## 2025-03-18 PROCEDURE — 99999 PR PBB SHADOW E&M-EST. PATIENT-LVL II: CPT | Mod: PBBFAC,,,

## 2025-03-18 PROCEDURE — 96372 THER/PROPH/DIAG INJ SC/IM: CPT | Mod: PBBFAC,PO

## 2025-03-18 RX ADMIN — TESTOSTERONE CYPIONATE 150 MG: 200 INJECTION INTRAMUSCULAR at 10:03

## 2025-03-18 NOTE — PROGRESS NOTES
Pt received 150 mg testosterone injection IM in LUQ of buttocks. Pt tolerated administration well. Pt refused to stay for observation. Pt verbalized understand of the risk with leaving the clinic.

## 2025-04-01 ENCOUNTER — CLINICAL SUPPORT (OUTPATIENT)
Dept: UROLOGY | Facility: CLINIC | Age: 80
End: 2025-04-01
Payer: MEDICARE

## 2025-04-01 DIAGNOSIS — R79.89 LOW TESTOSTERONE LEVEL IN MALE: Primary | ICD-10-CM

## 2025-04-01 PROCEDURE — 99212 OFFICE O/P EST SF 10 MIN: CPT | Mod: PBBFAC,PO

## 2025-04-01 PROCEDURE — 96372 THER/PROPH/DIAG INJ SC/IM: CPT | Mod: PBBFAC,PO

## 2025-04-01 PROCEDURE — 99999 PR PBB SHADOW E&M-EST. PATIENT-LVL II: CPT | Mod: PBBFAC,,,

## 2025-04-01 PROCEDURE — 99999PBSHW PR PBB SHADOW TECHNICAL ONLY FILED TO HB: Mod: PBBFAC,,,

## 2025-04-01 RX ADMIN — TESTOSTERONE CYPIONATE 150 MG: 200 INJECTION INTRAMUSCULAR at 10:04

## 2025-04-01 NOTE — PROGRESS NOTES
Pt received 150 mg testosterone injection IM in RUQ of buttocks. Pt tolerated administration well. Pt refused to stay for observation. Pt verbalized understand of the risk with leaving the clinic.

## 2025-04-15 ENCOUNTER — CLINICAL SUPPORT (OUTPATIENT)
Dept: UROLOGY | Facility: CLINIC | Age: 80
End: 2025-04-15
Payer: MEDICARE

## 2025-04-15 DIAGNOSIS — R79.89 LOW TESTOSTERONE LEVEL IN MALE: Primary | ICD-10-CM

## 2025-04-15 PROCEDURE — 99999PBSHW PR PBB SHADOW TECHNICAL ONLY FILED TO HB: Mod: PBBFAC,,,

## 2025-04-15 PROCEDURE — 99212 OFFICE O/P EST SF 10 MIN: CPT | Mod: PBBFAC,PO

## 2025-04-15 PROCEDURE — 96372 THER/PROPH/DIAG INJ SC/IM: CPT | Mod: PBBFAC,PO

## 2025-04-15 PROCEDURE — 99999 PR PBB SHADOW E&M-EST. PATIENT-LVL II: CPT | Mod: PBBFAC,,,

## 2025-04-15 RX ADMIN — TESTOSTERONE CYPIONATE 150 MG: 200 INJECTION INTRAMUSCULAR at 10:04

## 2025-04-27 ENCOUNTER — PATIENT MESSAGE (OUTPATIENT)
Dept: UROLOGY | Facility: CLINIC | Age: 80
End: 2025-04-27
Payer: MEDICARE

## 2025-04-28 DIAGNOSIS — Z12.5 SCREENING FOR PROSTATE CANCER: Primary | ICD-10-CM

## 2025-04-28 DIAGNOSIS — R79.89 LOW TESTOSTERONE LEVEL IN MALE: ICD-10-CM

## 2025-05-06 ENCOUNTER — CLINICAL SUPPORT (OUTPATIENT)
Dept: UROLOGY | Facility: CLINIC | Age: 80
End: 2025-05-06
Payer: MEDICARE

## 2025-05-06 DIAGNOSIS — R79.89 LOW TESTOSTERONE LEVEL IN MALE: Primary | ICD-10-CM

## 2025-05-06 PROCEDURE — 99999PBSHW PR PBB SHADOW TECHNICAL ONLY FILED TO HB: Mod: PBBFAC,,,

## 2025-05-06 PROCEDURE — 96372 THER/PROPH/DIAG INJ SC/IM: CPT | Mod: PBBFAC,PO

## 2025-05-06 PROCEDURE — 99999 PR PBB SHADOW E&M-EST. PATIENT-LVL II: CPT | Mod: PBBFAC,,,

## 2025-05-06 PROCEDURE — 99212 OFFICE O/P EST SF 10 MIN: CPT | Mod: PBBFAC,PO

## 2025-05-06 RX ADMIN — TESTOSTERONE CYPIONATE 150 MG: 200 INJECTION INTRAMUSCULAR at 01:05

## 2025-05-09 ENCOUNTER — LAB VISIT (OUTPATIENT)
Dept: LAB | Facility: HOSPITAL | Age: 80
End: 2025-05-09
Attending: UROLOGY
Payer: MEDICARE

## 2025-05-09 DIAGNOSIS — Z12.5 SCREENING FOR PROSTATE CANCER: ICD-10-CM

## 2025-05-09 DIAGNOSIS — R79.89 LOW TESTOSTERONE LEVEL IN MALE: ICD-10-CM

## 2025-05-09 LAB
ABSOLUTE EOSINOPHIL (OHS): 0.2 K/UL
ABSOLUTE MONOCYTE (OHS): 1.04 K/UL (ref 0.3–1)
ABSOLUTE NEUTROPHIL COUNT (OHS): 5.86 K/UL (ref 1.8–7.7)
BASOPHILS # BLD AUTO: 0.08 K/UL
BASOPHILS NFR BLD AUTO: 0.9 %
ERYTHROCYTE [DISTWIDTH] IN BLOOD BY AUTOMATED COUNT: 12.7 % (ref 11.5–14.5)
HCT VFR BLD AUTO: 44.2 % (ref 40–54)
HGB BLD-MCNC: 14.2 GM/DL (ref 14–18)
IMM GRANULOCYTES # BLD AUTO: 0.03 K/UL (ref 0–0.04)
IMM GRANULOCYTES NFR BLD AUTO: 0.4 % (ref 0–0.5)
LYMPHOCYTES # BLD AUTO: 1.34 K/UL (ref 1–4.8)
MCH RBC QN AUTO: 28.9 PG (ref 27–31)
MCHC RBC AUTO-ENTMCNC: 32.1 G/DL (ref 32–36)
MCV RBC AUTO: 90 FL (ref 82–98)
NUCLEATED RBC (/100WBC) (OHS): 0 /100 WBC
PLATELET # BLD AUTO: 206 K/UL (ref 150–450)
PMV BLD AUTO: 11.2 FL (ref 9.2–12.9)
PSA SERPL-MCNC: 1.31 NG/ML
RBC # BLD AUTO: 4.91 M/UL (ref 4.6–6.2)
RELATIVE EOSINOPHIL (OHS): 2.3 %
RELATIVE LYMPHOCYTE (OHS): 15.7 % (ref 18–48)
RELATIVE MONOCYTE (OHS): 12.2 % (ref 4–15)
RELATIVE NEUTROPHIL (OHS): 68.5 % (ref 38–73)
TESTOST SERPL-MCNC: 849 NG/DL (ref 304–1227)
WBC # BLD AUTO: 8.55 K/UL (ref 3.9–12.7)

## 2025-05-09 PROCEDURE — 84403 ASSAY OF TOTAL TESTOSTERONE: CPT

## 2025-05-09 PROCEDURE — 84153 ASSAY OF PSA TOTAL: CPT

## 2025-05-09 PROCEDURE — 36415 COLL VENOUS BLD VENIPUNCTURE: CPT | Mod: PO

## 2025-05-09 PROCEDURE — 85025 COMPLETE CBC W/AUTO DIFF WBC: CPT

## 2025-05-16 ENCOUNTER — OFFICE VISIT (OUTPATIENT)
Dept: UROLOGY | Facility: CLINIC | Age: 80
End: 2025-05-16
Payer: MEDICARE

## 2025-05-16 VITALS — HEIGHT: 66 IN | BODY MASS INDEX: 24.06 KG/M2 | WEIGHT: 149.69 LBS

## 2025-05-16 DIAGNOSIS — N13.8 ENLARGED PROSTATE WITH URINARY OBSTRUCTION: ICD-10-CM

## 2025-05-16 DIAGNOSIS — N40.1 ENLARGED PROSTATE WITH URINARY OBSTRUCTION: ICD-10-CM

## 2025-05-16 DIAGNOSIS — R79.89 LOW TESTOSTERONE LEVEL IN MALE: Primary | ICD-10-CM

## 2025-05-16 PROCEDURE — 99213 OFFICE O/P EST LOW 20 MIN: CPT | Mod: PBBFAC,PO | Performed by: UROLOGY

## 2025-05-16 PROCEDURE — 99999 PR PBB SHADOW E&M-EST. PATIENT-LVL III: CPT | Mod: PBBFAC,,, | Performed by: UROLOGY

## 2025-05-16 NOTE — PROGRESS NOTES
Subjective:       Patient ID: Mohit Scnheider is a 79 y.o. male.    Chief Complaint: Annual Exam    HPI    CHIEF COMPLAINT:  Patient presents today for annual checkup    GENITOURINARY/RENAL:  He reports nocturia, getting up a few times at night to urinate, which he finds tolerable.    IPSS    Incomplete emptying 2  Frequency  2   Intermittency  2  Urgency  2  Weak stream  2  Straining  2  Sleeping  3  Total:   15    TESTOSTERONE REPLACEMENT THERAPY:  He receives testosterone 150 mg injections every 2 weeks at the clinic and denies any problems with the injections. Testosterone level is 849. PSA is 1.3 and stable. Hemoglobin and hematocrit are within normal range.      ROS:  General: -fever, -chills, -fatigue, -weight gain, -weight loss  Eyes: -vision changes, -redness, -discharge  ENT: -ear pain, -nasal congestion, -sore throat  Cardiovascular: -chest pain, -palpitations, -lower extremity edema  Respiratory: -cough, -shortness of breath  Gastrointestinal: -abdominal pain, -nausea, -vomiting, -diarrhea, -constipation, -blood in stool  Genitourinary: -dysuria, -hematuria, -frequency  Musculoskeletal: -joint pain, -muscle pain  Skin: -rash, -lesion  Neurological: -headache, -dizziness, -numbness, -tingling  Psychiatric: -anxiety, -depression, -sleep difficulty  Male Genitourinary: +nocturia           Objective:        Physical Exam  Vitals reviewed.   Constitutional:       Appearance: He is well-developed.   Pulmonary:      Effort: Pulmonary effort is normal.   Neurological:      Mental Status: He is alert and oriented to person, place, and time.           Assessment & Plan    E29.1 Testicular hypofunction  R35.1 Nocturia  Z79.891 Long term (current) use of opiate analgesic    TESTICULAR HYPOFUNCTION:  - Reviewed recent labs: PSA 1.3 (stable), testosterone level 849, hemoglobin and hematocrit WNL.  - Assessed current testosterone replacement therapy effectiveness and tolerability.  - Determined to continue  current treatment plan without changes based on lab results and reported stability.  - Continue testosterone replacement therapy at current dose of 150 mg every 2 weeks, administered as in-office injections.    NOCTURIA:  - Nocturia deemed acceptable given tolerance.    URINARY HEALTH:  - Ordered urinalysis to be completed at the lab downstairs.    FOLLOW-UP:  - Follow up in 1 year for annual checkup.        Assessment:         1. Low testosterone level in male    2. Enlarged prostate with urinary obstruction          This note was generated with the assistance of ambient listening technology.  I attest to having reviewed and edited the generated note for accuracy, though some syntax or spelling errors may persist. Please contact the author of this note for any clarification.    Plan:       Low testosterone level in male  -     Urinalysis; Future; Expected date: 05/16/2025    Enlarged prostate with urinary obstruction        *Visit today included increased complexity associated with the current or anticipated ongoing medical longitudinal care and management related to this patient's serious and/or complex managed problem(s) as described above.

## 2025-05-20 ENCOUNTER — CLINICAL SUPPORT (OUTPATIENT)
Dept: UROLOGY | Facility: CLINIC | Age: 80
End: 2025-05-20
Payer: MEDICARE

## 2025-05-20 DIAGNOSIS — R79.89 LOW TESTOSTERONE LEVEL IN MALE: Primary | ICD-10-CM

## 2025-05-20 PROCEDURE — 96372 THER/PROPH/DIAG INJ SC/IM: CPT | Mod: PBBFAC,PO

## 2025-05-20 PROCEDURE — 99999PBSHW PR PBB SHADOW TECHNICAL ONLY FILED TO HB: Mod: PBBFAC,,,

## 2025-05-20 PROCEDURE — 99212 OFFICE O/P EST SF 10 MIN: CPT | Mod: PBBFAC,PO

## 2025-05-20 PROCEDURE — 99999 PR PBB SHADOW E&M-EST. PATIENT-LVL II: CPT | Mod: PBBFAC,,,

## 2025-05-20 RX ADMIN — TESTOSTERONE CYPIONATE 150 MG: 200 INJECTION INTRAMUSCULAR at 02:05

## 2025-06-03 ENCOUNTER — CLINICAL SUPPORT (OUTPATIENT)
Dept: UROLOGY | Facility: CLINIC | Age: 80
End: 2025-06-03
Payer: MEDICARE

## 2025-06-03 DIAGNOSIS — R79.89 LOW TESTOSTERONE LEVEL IN MALE: Primary | ICD-10-CM

## 2025-06-03 PROCEDURE — 99999 PR PBB SHADOW E&M-EST. PATIENT-LVL II: CPT | Mod: PBBFAC,,,

## 2025-06-03 PROCEDURE — 99212 OFFICE O/P EST SF 10 MIN: CPT | Mod: PBBFAC,PO

## 2025-06-03 PROCEDURE — 96372 THER/PROPH/DIAG INJ SC/IM: CPT | Mod: PBBFAC,PO

## 2025-06-03 PROCEDURE — 99999PBSHW PR PBB SHADOW TECHNICAL ONLY FILED TO HB: Mod: PBBFAC,,,

## 2025-06-03 RX ADMIN — TESTOSTERONE CYPIONATE 150 MG: 200 INJECTION INTRAMUSCULAR at 11:06

## 2025-06-17 ENCOUNTER — CLINICAL SUPPORT (OUTPATIENT)
Dept: UROLOGY | Facility: CLINIC | Age: 80
End: 2025-06-17
Payer: MEDICARE

## 2025-06-17 DIAGNOSIS — R79.89 LOW TESTOSTERONE LEVEL IN MALE: Primary | ICD-10-CM

## 2025-06-17 PROCEDURE — 96372 THER/PROPH/DIAG INJ SC/IM: CPT | Mod: PBBFAC,PO

## 2025-06-17 PROCEDURE — 99999PBSHW PR PBB SHADOW TECHNICAL ONLY FILED TO HB: Mod: PBBFAC,,,

## 2025-06-17 PROCEDURE — 99212 OFFICE O/P EST SF 10 MIN: CPT | Mod: PBBFAC,PO

## 2025-06-17 PROCEDURE — 99999 PR PBB SHADOW E&M-EST. PATIENT-LVL II: CPT | Mod: PBBFAC,,,

## 2025-06-17 RX ADMIN — TESTOSTERONE CYPIONATE 150 MG: 200 INJECTION INTRAMUSCULAR at 11:06

## 2025-06-27 ENCOUNTER — TELEPHONE (OUTPATIENT)
Dept: UROLOGY | Facility: CLINIC | Age: 80
End: 2025-06-27
Payer: MEDICARE

## 2025-06-27 NOTE — PROGRESS NOTES
Testosterone injection given 150mg, see MAR. Patient tolerated well, recommended to wait 20 minutes after injection to observe for adverse reaction, patient declined to wait.     No

## 2025-07-01 ENCOUNTER — CLINICAL SUPPORT (OUTPATIENT)
Dept: UROLOGY | Facility: CLINIC | Age: 80
End: 2025-07-01
Payer: MEDICARE

## 2025-07-01 DIAGNOSIS — R79.89 LOW TESTOSTERONE LEVEL IN MALE: Primary | ICD-10-CM

## 2025-07-01 PROCEDURE — 99999PBSHW PR PBB SHADOW TECHNICAL ONLY FILED TO HB: Mod: PBBFAC,,,

## 2025-07-01 PROCEDURE — 96372 THER/PROPH/DIAG INJ SC/IM: CPT | Mod: PBBFAC,PO

## 2025-07-01 PROCEDURE — 99999 PR PBB SHADOW E&M-EST. PATIENT-LVL II: CPT | Mod: PBBFAC,,,

## 2025-07-01 PROCEDURE — 99212 OFFICE O/P EST SF 10 MIN: CPT | Mod: PBBFAC,PO

## 2025-07-01 RX ORDER — TESTOSTERONE CYPIONATE 200 MG/ML
150 INJECTION, SOLUTION INTRAMUSCULAR
Status: SHIPPED | OUTPATIENT
Start: 2025-07-01 | End: 2025-07-15

## 2025-07-01 RX ADMIN — TESTOSTERONE CYPIONATE 150 MG: 200 INJECTION INTRAMUSCULAR at 11:07

## 2025-07-09 ENCOUNTER — PATIENT MESSAGE (OUTPATIENT)
Dept: UROLOGY | Facility: CLINIC | Age: 80
End: 2025-07-09
Payer: MEDICARE

## 2025-07-15 ENCOUNTER — CLINICAL SUPPORT (OUTPATIENT)
Dept: UROLOGY | Facility: CLINIC | Age: 80
End: 2025-07-15
Payer: MEDICARE

## 2025-07-15 DIAGNOSIS — R79.89 LOW TESTOSTERONE LEVEL IN MALE: Primary | ICD-10-CM

## 2025-07-15 PROCEDURE — 99999PBSHW PR PBB SHADOW TECHNICAL ONLY FILED TO HB: Mod: PBBFAC,,,

## 2025-07-15 PROCEDURE — 99999 PR PBB SHADOW E&M-EST. PATIENT-LVL II: CPT | Mod: PBBFAC,,,

## 2025-07-15 PROCEDURE — 96372 THER/PROPH/DIAG INJ SC/IM: CPT | Mod: PBBFAC,PO

## 2025-07-15 PROCEDURE — 99212 OFFICE O/P EST SF 10 MIN: CPT | Mod: PBBFAC,PO

## 2025-07-15 RX ADMIN — TESTOSTERONE CYPIONATE 150 MG: 200 INJECTION, SOLUTION INTRAMUSCULAR at 11:07

## 2025-07-25 NOTE — PROGRESS NOTES
Pt received 150 mg testosterone injection IM in RUQ of buttocks. Pt tolerated administration well. Pt refused to stay for observation. Pt verbalized understand of the risk with leaving the clinic.     
27-Jul-2023 16:31

## 2025-07-29 ENCOUNTER — CLINICAL SUPPORT (OUTPATIENT)
Dept: UROLOGY | Facility: CLINIC | Age: 80
End: 2025-07-29
Payer: MEDICARE

## 2025-07-29 DIAGNOSIS — R79.89 LOW TESTOSTERONE LEVEL IN MALE: Primary | ICD-10-CM

## 2025-07-29 PROCEDURE — 99999 PR PBB SHADOW E&M-EST. PATIENT-LVL II: CPT | Mod: PBBFAC,,,

## 2025-07-29 PROCEDURE — 99999PBSHW PR PBB SHADOW TECHNICAL ONLY FILED TO HB: Mod: PBBFAC,,,

## 2025-07-29 PROCEDURE — 99212 OFFICE O/P EST SF 10 MIN: CPT | Mod: PBBFAC,PO

## 2025-07-29 PROCEDURE — 96372 THER/PROPH/DIAG INJ SC/IM: CPT | Mod: PBBFAC,PO

## 2025-07-29 RX ADMIN — TESTOSTERONE CYPIONATE 150 MG: 200 INJECTION, SOLUTION INTRAMUSCULAR at 11:07

## 2025-08-12 ENCOUNTER — CLINICAL SUPPORT (OUTPATIENT)
Dept: UROLOGY | Facility: CLINIC | Age: 80
End: 2025-08-12
Payer: MEDICARE

## 2025-08-12 DIAGNOSIS — R79.89 LOW TESTOSTERONE LEVEL IN MALE: Primary | ICD-10-CM

## 2025-08-12 PROCEDURE — 99999PBSHW PR PBB SHADOW TECHNICAL ONLY FILED TO HB: Mod: PBBFAC,,,

## 2025-08-12 PROCEDURE — 96372 THER/PROPH/DIAG INJ SC/IM: CPT | Mod: PBBFAC,PO

## 2025-08-12 PROCEDURE — 99212 OFFICE O/P EST SF 10 MIN: CPT | Mod: PBBFAC,PO

## 2025-08-12 PROCEDURE — 99999 PR PBB SHADOW E&M-EST. PATIENT-LVL II: CPT | Mod: PBBFAC,,,

## 2025-08-12 RX ORDER — TESTOSTERONE CYPIONATE 200 MG/ML
150 INJECTION, SOLUTION INTRAMUSCULAR
Status: SHIPPED | OUTPATIENT
Start: 2025-08-12 | End: 2026-01-27

## 2025-08-12 RX ADMIN — TESTOSTERONE CYPIONATE 150 MG: 200 INJECTION, SOLUTION INTRAMUSCULAR at 12:08

## 2025-08-26 ENCOUNTER — CLINICAL SUPPORT (OUTPATIENT)
Dept: UROLOGY | Facility: CLINIC | Age: 80
End: 2025-08-26
Payer: MEDICARE

## 2025-08-26 DIAGNOSIS — R79.89 LOW TESTOSTERONE LEVEL IN MALE: Primary | ICD-10-CM

## 2025-08-26 PROCEDURE — 99212 OFFICE O/P EST SF 10 MIN: CPT | Mod: PBBFAC,PO

## 2025-08-26 PROCEDURE — 99999 PR PBB SHADOW E&M-EST. PATIENT-LVL II: CPT | Mod: PBBFAC,,,

## 2025-08-26 PROCEDURE — 99999PBSHW PR PBB SHADOW TECHNICAL ONLY FILED TO HB: Mod: PBBFAC,,,

## 2025-08-26 PROCEDURE — 96372 THER/PROPH/DIAG INJ SC/IM: CPT | Mod: PBBFAC,PO

## 2025-08-26 RX ADMIN — TESTOSTERONE CYPIONATE 150 MG: 200 INJECTION, SOLUTION INTRAMUSCULAR at 11:08

## (undated) DEVICE — GLOVE SENSICARE PI MICRO 7

## (undated) DEVICE — MARKER SKIN RULER STERILE

## (undated) DEVICE — TRAY NERVE BLOCK

## (undated) DEVICE — NDL TUOHY EPIDURAL 20G X 3.5

## (undated) DEVICE — SYR GLASS 5CC LUER LOK

## (undated) DEVICE — APPLICATOR CHLORAPREP CLR 10.5